# Patient Record
Sex: FEMALE | Race: WHITE
[De-identification: names, ages, dates, MRNs, and addresses within clinical notes are randomized per-mention and may not be internally consistent; named-entity substitution may affect disease eponyms.]

---

## 2017-04-07 ENCOUNTER — HOSPITAL ENCOUNTER (INPATIENT)
Dept: HOSPITAL 10 - E/R | Age: 82
LOS: 5 days | Discharge: HOME | DRG: 872 | End: 2017-04-12
Attending: INTERNAL MEDICINE | Admitting: INTERNAL MEDICINE
Payer: MEDICARE

## 2017-04-07 VITALS
BODY MASS INDEX: 26.84 KG/M2 | WEIGHT: 136.69 LBS | HEIGHT: 60 IN | WEIGHT: 136.69 LBS | HEIGHT: 60 IN | BODY MASS INDEX: 26.84 KG/M2

## 2017-04-07 VITALS — HEART RATE: 81 BPM | SYSTOLIC BLOOD PRESSURE: 137 MMHG | DIASTOLIC BLOOD PRESSURE: 64 MMHG | RESPIRATION RATE: 18 BRPM

## 2017-04-07 VITALS — TEMPERATURE: 98.8 F

## 2017-04-07 VITALS — DIASTOLIC BLOOD PRESSURE: 62 MMHG | SYSTOLIC BLOOD PRESSURE: 101 MMHG | RESPIRATION RATE: 19 BRPM

## 2017-04-07 DIAGNOSIS — E87.1: ICD-10-CM

## 2017-04-07 DIAGNOSIS — M06.9: ICD-10-CM

## 2017-04-07 DIAGNOSIS — I10: ICD-10-CM

## 2017-04-07 DIAGNOSIS — E20.9: ICD-10-CM

## 2017-04-07 DIAGNOSIS — A41.9: Primary | ICD-10-CM

## 2017-04-07 DIAGNOSIS — E78.5: ICD-10-CM

## 2017-04-07 DIAGNOSIS — K83.0: ICD-10-CM

## 2017-04-07 DIAGNOSIS — R73.03: ICD-10-CM

## 2017-04-07 DIAGNOSIS — M32.0: ICD-10-CM

## 2017-04-07 DIAGNOSIS — K80.42: ICD-10-CM

## 2017-04-07 DIAGNOSIS — E87.6: ICD-10-CM

## 2017-04-07 DIAGNOSIS — K82.9: ICD-10-CM

## 2017-04-07 DIAGNOSIS — K83.8: ICD-10-CM

## 2017-04-07 DIAGNOSIS — D64.9: ICD-10-CM

## 2017-04-07 LAB
ADD SCAN DIFF: NO
ADD UMIC: YES
ALBUMIN SERPL-MCNC: 3.7 G/DL (ref 3.3–4.9)
ALBUMIN/GLOB SERPL: 1.02 {RATIO}
ALP SERPL-CCNC: 182 IU/L (ref 42–121)
ALT SERPL-CCNC: 26 IU/L (ref 13–69)
ANION GAP SERPL CALC-SCNC: 11 MMOL/L (ref 8–16)
AST SERPL-CCNC: 20 IU/L (ref 15–46)
BACTERIA #/AREA URNS HPF: (no result) /[HPF]
BASOPHILS # BLD AUTO: 0.1 10^3/UL (ref 0–0.1)
BASOPHILS NFR BLD: 0.2 % (ref 0–2)
BILIRUB DIRECT SERPL-MCNC: 0 MG/DL (ref 0–0.2)
BILIRUB SERPL-MCNC: 0.7 MG/DL (ref 0.2–1.3)
BUN SERPL-MCNC: 8 MG/DL (ref 7–20)
CALCIUM SERPL-MCNC: 9 MG/DL (ref 8.4–10.2)
CHLORIDE SERPL-SCNC: 100 MMOL/L (ref 97–110)
CO2 SERPL-SCNC: 26 MMOL/L (ref 21–31)
COLOR UR: (no result)
CREAT SERPL-MCNC: 0.51 MG/DL (ref 0.44–1)
EOSINOPHIL # BLD: 0 10^3/UL (ref 0–0.5)
EOSINOPHIL NFR BLD: 0 % (ref 0–7)
ERYTHROCYTE [DISTWIDTH] IN BLOOD BY AUTOMATED COUNT: 16.1 % (ref 11.5–14.5)
GLOBULIN SER-MCNC: 3.6 G/DL (ref 1.3–3.2)
GLUCOSE SERPL-MCNC: 154 MG/DL (ref 70–220)
GLUCOSE UR STRIP-MCNC: NEGATIVE %
HCT VFR BLD CALC: 33 % (ref 37–47)
HGB BLD-MCNC: 10.7 G/DL (ref 12–16)
KETONES UR STRIP.AUTO-MCNC: 40 MG/DL
LYMPHOCYTES # BLD AUTO: 1 10^3/UL (ref 0.8–2.9)
LYMPHOCYTES NFR BLD AUTO: 4.7 % (ref 15–51)
MCH RBC QN AUTO: 27.2 PG (ref 29–33)
MCHC RBC AUTO-ENTMCNC: 32.4 G/DL (ref 32–37)
MCV RBC AUTO: 83.8 FL (ref 82–101)
MONOCYTES # BLD: 1.4 10^3/UL (ref 0.3–0.9)
MONOCYTES NFR BLD: 6.9 % (ref 0–11)
NEUTROPHILS # BLD: 18 10^3/UL (ref 1.6–7.5)
NEUTROPHILS NFR BLD AUTO: 87.6 % (ref 39–77)
NITRITE UR QL STRIP.AUTO: NEGATIVE
NRBC # BLD MANUAL: 0 10^3/UL (ref 0–0)
NRBC BLD QL: 0 /100WBC (ref 0–0)
PLATELET # BLD: 398 10^3/UL (ref 140–415)
PMV BLD AUTO: 9.3 FL (ref 7.4–10.4)
POTASSIUM SERPL-SCNC: 3.3 MMOL/L (ref 3.5–5.1)
PROT SERPL-MCNC: 7.3 G/DL (ref 6.1–8.1)
RBC # BLD AUTO: 3.94 10^6/UL (ref 4.2–5.4)
RBC # UR AUTO: (no result) /UL
RBC #/AREA URNS HPF: (no result) /HPF
SODIUM SERPL-SCNC: 134 MMOL/L (ref 135–144)
URINE BILIRUBIN (DIP): NEGATIVE
URINE TOTAL PROTEIN (DIP): NEGATIVE
UROBILINOGEN UR STRIP-ACNC: (no result) (ref 0.1–1)
WBC # BLD AUTO: 20.6 10^3/UL (ref 4.8–10.8)
WBC # UR STRIP: NEGATIVE /UL

## 2017-04-07 PROCEDURE — 82962 GLUCOSE BLOOD TEST: CPT

## 2017-04-07 PROCEDURE — 81001 URINALYSIS AUTO W/SCOPE: CPT

## 2017-04-07 PROCEDURE — 80048 BASIC METABOLIC PNL TOTAL CA: CPT

## 2017-04-07 PROCEDURE — 96375 TX/PRO/DX INJ NEW DRUG ADDON: CPT

## 2017-04-07 PROCEDURE — 81003 URINALYSIS AUTO W/O SCOPE: CPT

## 2017-04-07 PROCEDURE — C2617 STENT, NON-COR, TEM W/O DEL: HCPCS

## 2017-04-07 PROCEDURE — 82247 BILIRUBIN TOTAL: CPT

## 2017-04-07 PROCEDURE — 96376 TX/PRO/DX INJ SAME DRUG ADON: CPT

## 2017-04-07 PROCEDURE — 80076 HEPATIC FUNCTION PANEL: CPT

## 2017-04-07 PROCEDURE — 82248 BILIRUBIN DIRECT: CPT

## 2017-04-07 PROCEDURE — 74181 MRI ABDOMEN W/O CONTRAST: CPT

## 2017-04-07 PROCEDURE — 76705 ECHO EXAM OF ABDOMEN: CPT

## 2017-04-07 PROCEDURE — 84484 ASSAY OF TROPONIN QUANT: CPT

## 2017-04-07 PROCEDURE — 96361 HYDRATE IV INFUSION ADD-ON: CPT

## 2017-04-07 PROCEDURE — 96365 THER/PROPH/DIAG IV INF INIT: CPT

## 2017-04-07 PROCEDURE — 93005 ELECTROCARDIOGRAM TRACING: CPT

## 2017-04-07 PROCEDURE — 74330 X-RAY BILE/PANC ENDOSCOPY: CPT

## 2017-04-07 PROCEDURE — 80053 COMPREHEN METABOLIC PANEL: CPT

## 2017-04-07 PROCEDURE — 85025 COMPLETE CBC W/AUTO DIFF WBC: CPT

## 2017-04-07 PROCEDURE — 36415 COLL VENOUS BLD VENIPUNCTURE: CPT

## 2017-04-07 PROCEDURE — 83690 ASSAY OF LIPASE: CPT

## 2017-04-07 PROCEDURE — 74176 CT ABD & PELVIS W/O CONTRAST: CPT

## 2017-04-07 PROCEDURE — 83735 ASSAY OF MAGNESIUM: CPT

## 2017-04-07 RX ADMIN — MORPHINE SULFATE PRN MG: 2 INJECTION, SOLUTION INTRAMUSCULAR; INTRAVENOUS at 21:51

## 2017-04-07 RX ADMIN — ASCORBIC ACID, VITAMIN A PALMITATE, CHOLECALCIFEROL, THIAMINE HYDROCHLORIDE, RIBOFLAVIN-5 PHOSPHATE SODIUM, PYRIDOXINE HYDROCHLORIDE, NIACINAMIDE, DEXPANTHENOL, ALPHA-TOCOPHEROL ACETATE, VITAMIN K1, FOLIC ACID, BIOTIN, CYANOCOBALAMIN SCH MLS/HR: 200; 3300; 200; 6; 3.6; 6; 40; 15; 10; 150; 600; 60; 5 INJECTION, SOLUTION INTRAVENOUS at 19:53

## 2017-04-07 RX ADMIN — Medication SCH MLS/HR: at 21:53

## 2017-04-07 NOTE — HP
DATE OF ADMISSION: 04/07/2017

 

CHIEF COMPLAINT:  Right upper quadrant pain.

 

HISTORY OF PRESENT ILLNESS:  The patient is an 81-year-old female with a past medical history positi
ve for hypertension, hyperlipidemia, being prediabetic, rheumatoid arthritis and hypothyroidism.  Ulices adames was in her usual health until 3 days ago when the patient developed right upper quadrant pain.
  It got extremely worse over the last 12 hours.  The patient stated the pain was sharp and associat
ed with mild nausea.  Patient denies any fevers, denies chills, denies chest pain, denies shortness 
of breath, denies bilateral lower extremities edema.  Denied vomiting.  On admission to the emergenc
y room, patient had leukocytosis with white blood cells being elevated at 20.6.  Patient underwent a
 gallbladder ultrasound which revealed no cholelithiasis; however, the common bile duct is mild to m
oderately dilated.  

 

The patient also underwent a CT of the abdomen and pelvis which revealed distended gallbladder with 
mild surrounding inflammation suggesting for acute cholecystitis, diverticulosis of the descending a
nd sigmoid colon without evidence of diverticulitis, multiple bilateral renal cortical and parapelvi
c cysts, aortoiliac atherosclerosis, moderate hiatal hernia, bronchiectasis in the right middle lobe
 and left lingular segment.

 

The patient was given Zofran for nausea and morphine for pain and was admitted for further evaluatio
n and management to medical/surgical floor.

 

PAST MEDICAL HISTORY:  Positive for hypertension, hyperlipidemia, gastritis, prediabetes and rheumat
oid arthritis.  Patient is followed by Dr. Dunn in rheumatology consultation was started on meth
otrexate and also a history of hypothyroidism.

 

PAST SURGICAL HISTORY:  Patient had a colonoscopy a couple of years ago.  Denies any other surgeries
.

 

FAMILY HISTORY:  Noncontributory.

 

SOCIAL HISTORY:  Patient lives at home with his son.  Patient denies any tobacco use, denies any ill
icit drug use and denies any alcohol use.

 

ALLERGIES:  PATIENT IS ALLERGIC TO PENICILLIN G.

 

MEDICATIONS ON ADMISSION:  

1.  Acarbose

2.  Methotrexate.

3.  Valsartan.

4.  Atorvastatin.

5.  Esomeprazole.

6.  Levothyroxine.

 

REVIEW OF SYSTEMS:  A 12-point review of systems is negative unless what mentioned in the HPI.

 

PHYSICAL  ASSESSMENT

GENERAL:  Well-developed, well-nourished female, awake, alert.

VITAL SIGNS:  Temperature is 99.1, pulse is 81, blood pressure is 137/64, respiratory rate 18, oxyge
n saturation 95% on room air.

HEENT:  Head is atraumatic, normocephalic.  Pupils equal bilaterally.  Reactive to light and accommo
dation.  Oral mucosa is pink and moist.

NECK:  Supple, no cervical lymphadenopathy, no thyromegaly.

RESPIRATORY:  Lungs were clear bilaterally.  There is no rhonchi, wheezes, rales noted.

CARDIOVASCULAR:  Normal S1, S2.  No murmurs, gallops, clicks, rubs noted.

GASTROINTESTINAL:  Abdomen is round, soft, nondistended.  Patient has right upper quadrant tendernes
s.

EXTREMITIES:  There is no edema, clubbing, cyanosis.  Pulses equal bilaterally 2+.

SKIN:  There is no rash, petechiae noted.

NEUROLOGIC:  Patient is alert and oriented x4.  No focal deficits noted.  Motor strength 5/5 in lowe
r extremities.

 

LABORATORY DATA:  On admission, CBC:  White blood cells 20.6, hemoglobin 10.7, hematocrit 33.0, plat
elets 398.  Chemistry:  Sodium was 134, potassium 3.3, chloride 100, carbon dioxide 26, anion gap 11
, BUN is 8, creatinine 0.51 and glucose 154, AST 20, ALT 26, alkaline phosphatase 182.  Troponin les
s than 0.01.  Lipase is 18.  Urine is negative for nitrite, negative for leukocyte esterase.

 

ASSESSMENT AND PLAN:

1.  Possible acute cholecystitis.  The patient is given Flagyl and Rocephin in the emergency room.  
Will start patient on Zosyn.

2.  Systemic inflammatory response syndrome with leukocytosis secondary to acute cholecystitis.  Con
tinue antibiotics.  Will obtain blood cultures to rule out bacteremia.

3.  Rule out choledocholithiasis.  The patient will undergo MRCP.  Dr. Knapp was asked to see patie
nt in gastroenterology consultation.  We will ask Dr. Edwards to see patient in general surgery cons
ultation.

4.  Hypertension.  We will continue the patient on Diovan

5.  Hydralazine p.r.n. for systolic blood pressure above 170.

6.  Rheumatoid arthritis.  Continue methotrexate.

7.  Hypoparathyroidism.  Continue Synthroid.

8.  Hyperlipidemia.  Continue statin.

9.  Gastritis by history.  Continue Protonix for peptic ulcer disease prophylaxis.  We will continue
 sequential compression device for deep venous thrombosis prophylaxis.  Further recommendations base
d on clinical course.  Plan of care discussed with Dr. Galdamez.

 

 

Dictated By: RADHA DE JESUS NP for VIOLETTE GALDAMEZ MD

 

SR/NTS

DD:    04/07/2017 18:19:47

DT:    04/07/2017 18:59:10

Conf#: [id number]

DID#:  [id number]

## 2017-04-07 NOTE — RADRPT
PROCEDURE:   MRCP. 

 

CLINICAL INDICATION:   Abdominal pain. 

 

TECHNIQUE:   MRCP was performed.  Patient was examined without contrast.  3-D coronal rotating MIP i
mages of the biliary tree are available for review. 

 

COMPARISON:   CT and ultrasound, 04/07/2017  

 

FINDINGS:

 

The gallbladder is distended.  Gallbladder wall thickening and trace pericholecystic fluid are ident
ified.  No definite evidence of gallstone is seen.  There is no intra or extrahepatic biliary dilata
tion.  No common duct stone, stricture or filling defect is seen.  Pancreatic duct is normal in mckenzie
iris.

 

Liver, pancreas, spleen, adrenal glands and kidneys are unremarkable except for bilateral benign stephanie
al cysts.  There is no obstructive uropathy.  Moderate to large hiatal hernia is present.

 

Abdominal aorta is normal in caliber.  There is no retroperitoneal or anna marie hepatis lymphadenopathy.
  Colonic diverticulosis is seen without evidence for diverticulitis.  There is no evidence of bowel
 obstruction.  Urinary bladder appears significantly distended, concerning for urinary retention.  T
he surrounding osseous structures are remarkable for scoliosis and degenerative spondylosis of the s
pine.

 

IMPRESSION:

 

1.  The gallbladder is distended.  Gallbladder wall thickening and trace pericholecystic fluid are i
dentified, concerning for cholecystitis.  No definite evidence of gallstone is seen.  Consider nucle
ar medicine HIDA scan for further evaluation, if clinically warranted.  

2.  No biliary dilatation or choledocholithiasis is identified.  Common bile duct maximal diameter i
s 4 mm.

3.  Moderate to large hiatal hernia is present.

4.  Colonic diverticulosis is seen without diverticulitis.

5.  Urinary bladder is significantly distended, concerning for urinary retention.

 

RPTAT: PP

_____________________________________________ 

.Antwan Valdez MD, MD           Date    Time 

Electronically viewed and signed by .Antwan Valdez MD, MD on 04/07/2017 15:45 

 

D:  04/07/2017 15:45  T:  04/07/2017 15:45

.R/

## 2017-04-07 NOTE — ERA
ER Documentation


Chief Complaint


Date/Time


DATE: 4/7/17 


TIME: 11:11


Chief Complaint


pt bib self with c/o abd pain starting a few days ago rad to right flank





HPI


This is an 81-year-old female who presents to the emergency room with her 

family for evaluation of abdominal pain.  The patient states that she has had 

abdominal pain for the past 12 hours.  She localizes it to the midportion of 

her abdomen, she states that it does radiate to her back.  She states that it 

is sharp and achy pain associated with mild nausea.  The patient denies any 

fevers associated with this, denies any relieving factors for her pain and came 

to the emergency room for evaluation





ROS


All systems reviewed and are negative except as per history of present illness.





Medications


Home Meds


Reported Medications


Cholecalciferol* (Vitamin D3*) 1,000 Unit Tablet, 5000 UNIT PO DAILY, TAB


   4/7/17


Methotrexate* (Methotrexate*) 2.5 Mg Tab, 7.5 MG PO BID, TAB


   4/7/17


Levothyroxine Sodium* (Levoxyl*) 50 Mcg Tablet, 50 MCG PO BEFORE BREAKFAST, #30 

TAB


   4/7/17


Acarbose* (Precose*) 50 Mg Tablet, 50 MG PO WITH BREAKFAST, TAB


   4/7/17


Valsartan* (Diovan*) 160 Mg Tablet, 160 MG PO DAILY, TAB


   12/6/16


Esomeprazole Mag Trihydrate (Nexium) 40 Mg Capsule.dr, 40 MG PO DAILY


   10/20/12


Atorvastatin (Lipitor) 80 Mg Tablet, 80 MG PO DAILY


   10/20/12


Discontinued Reported Medications


Krill/Om-3/Dha/Epa/Phospho/Ast (Megared Omega-3 Krill Oil Sfgl) 1 Each Capsule, 

1 EACH PO, CAP


   12/6/16


Benazepril Hcl* (Benazepril Hcl*) 40 Mg Tablet, 40 MG PO DAILY, #30 TAB


   12/6/16


Acarbose* (Precose*) 25 Mg Tablet, 25 MG PO WITH BREAKFAST, TAB


   12/6/16


Ibuprofen* (Motrin*) 600 Mg Tab, 600 MG PO Q8H Y for PAIN, TAB


   12/6/16


Calcium Carbonate/Vitamin D3 (Oysco 500+D Tablet) 1 Tab Tablet, 1 TAB PO DAILY


   10/20/12


Cholecalciferol* (Vitamin D3*) 2,000 Unit Cap, 2000 UNIT GTB DAILY


   10/20/12


Discontinued Scripts


Phenazopyridine Hcl* (Pyridium*) 100 Mg Tab, 100 MG PO TID Y for URINARY PAIN, #

8 TAB


   Prov:AASHISH ROBERTS DO         12/6/16


Ciprofloxacin Hcl* (Ciprofloxacin Hcl*) 500 Mg Tablet, 500 MG PO BID for 3 Days

, TAB


   Prov:AASHISH ROBERTS DO         12/6/16





Allergies


Allergies:  


Coded Allergies:  


     penicillin G (Verified  Allergy, Intermediate, RASH, HEADACHE, 12/6/16)





PMhx/Soc


History of Surgery:  No


Anesthesia Reaction:  No


Hx Neurological Disorder:  No


Hx Respiratory Disorders:  No


Hx Cardiac Disorders:  Yes (HTN)


Hx Psychiatric Problems:  No


Hx Miscellaneous Medical Probl:  Yes (hyperlipidemia , OSTEOPOROSIS , ARTHRITIS 

)


Hx Alcohol Use:  No


Hx Substance Use:  No


Hx Tobacco Use:  No


Smoking Status:  Never smoker





Physical Exam


Vitals





Vital Signs








  Date Time  Temp Pulse Resp B/P Pulse Ox O2 Delivery O2 Flow Rate FiO2


 


4/7/17 09:59 99.4 83 18 152/61 98 Room Air  


 


4/7/17 08:55 98.8 83 18 119/73 100 Room Air  


 


4/7/17 08:12 98.3 87 18 134/60 100   








Physical Exam


INITIAL VITAL SIGNS: Reviewed by me


GENERAL:  The patient is well developed and appropriate for usual state of 

health who appears to have mild abdominal pain


HEENT: Pupils equal, round, and reactive to light.  EOMI. There is no scleral 

icterus.


NECK:  C-spine is soft and supple, there is no meningismus.  There is no 

cervical lymphadenopathy.


LUNGS:  Clear to auscultation bilaterally. There are no rales, wheezes or 

rhonchi.


HEART:  Regular rate and rhythm, no murmurs, clicks, rubs or gallops.


ABDOMEN: Positive Wogn sign, otherwise no CVAT, soft, non-tender, non-

distended.  There are bowel sounds in all four quadrants. No rebound or 

guarding.


EXTREMITIES:  There is no peripheral cyanosis or edema.  No focal swelling or 

erythema.


NEUROLOGICAL:  The patient moves all four extremities with 5/5 strength.  

Cranial nerves II - XII are intact. Normal gait. Alert and oriented


SKIN:  There is no apparent rash or petechiae.


HEME/LYMPHATIC:  There is no evidence of excessive bruising or lymphedema.


PSYCHIATRIC:  The patient does not appear anxious or depressed.


Result Diagram:  


4/7/17 0840                                                                    

            4/7/17 0840





Results 24 hrs





 Laboratory Tests








Test


  4/7/17


08:40 4/7/17


09:42


 


White Blood Count 20.610^3/ul  


 


Red Blood Count 3.9410^6/ul  


 


Hemoglobin 10.7g/dl  


 


Hematocrit 33.0%  


 


Mean Corpuscular Volume 83.8fl  


 


Mean Corpuscular Hemoglobin 27.2pg  


 


Mean Corpuscular Hemoglobin


Concent 32.4g/dl 


  


 


 


Red Cell Distribution Width 16.1%  


 


Platelet Count 36585^3/UL  


 


Mean Platelet Volume 9.3fl  


 


Neutrophils % 87.6%  


 


Lymphocytes % 4.7%  


 


Monocytes % 6.9%  


 


Eosinophils % 0.0%  


 


Basophils % 0.2%  


 


Nucleated Red Blood Cells % 0.0/100WBC  


 


Neutrophils # 18.010^3/ul  


 


Lymphocytes # 1.010^3/ul  


 


Monocytes # 1.410^3/ul  


 


Eosinophils # 0.010^3/ul  


 


Basophils # 0.110^3/ul  


 


Nucleated Red Blood Cells # 0.010^3/ul  


 


Sodium Level 134mmol/L  


 


Potassium Level 3.3mmol/L  


 


Chloride Level 100mmol/L  


 


Carbon Dioxide Level 26mmol/L  


 


Anion Gap 11  


 


Blood Urea Nitrogen 8mg/dl  


 


Creatinine 0.51mg/dl  


 


Glucose Level 154mg/dl  


 


Calcium Level 9.0mg/dl  


 


Total Bilirubin 0.7mg/dl  


 


Direct Bilirubin 0.00mg/dl  


 


Indirect Bilirubin 0.7mg/dl  


 


Aspartate Amino Transf


(AST/SGOT) 20IU/L 


  


 


 


Alanine Aminotransferase


(ALT/SGPT) 26IU/L 


  


 


 


Alkaline Phosphatase 182IU/L  


 


Troponin I < 0.010ng/ml  


 


Total Protein 7.3g/dl  


 


Albumin 3.7g/dl  


 


Globulin 3.60g/dl  


 


Albumin/Globulin Ratio 1.02  


 


Lipase 18U/L  


 


Urine Color  LT. YELLOW 


 


Urine Clarity  CLEAR 


 


Urine pH  8.0 


 


Urine Specific Gravity  1.010 


 


Urine Ketones  40 


 


Urine Nitrite  NEGATIVE 


 


Urine Bilirubin  NEGATIVE 


 


Urine Urobilinogen  2.0 E.U./dL 


 


Urine Leukocyte Esterase  NEGATIVE 


 


Urine Microscopic RBC  25-50/HPF 


 


Urine Microscopic WBC  0-2/HPF 


 


Urine Epithelial Cells  FEW 


 


Urine Bacteria  FEW 


 


Urine Hemoglobin  3+ 


 


Urine Glucose  NEGATIVE% 


 


Urine Total Protein  NEGATIVE 








 Current Medications








 Medications


  (Trade)  Dose


 Ordered  Sig/James


 Route


 PRN Reason  Start Time


 Stop Time Status Last Admin


Dose Admin


 


 Sodium Chloride


  (NS)  1,000 ml @ 


 1,000 mls/hr  Q1H STAT


 IV


   4/7/17 08:20


 4/7/17 09:19 DC 4/7/17 08:53


 


 


 Morphine Sulfate


  (morphine)  4 mg  ONCE  STAT


 IV


   4/7/17 08:20


 4/7/17 08:21 DC 4/7/17 08:51


 


 


 Ondansetron HCl


  (Zofran Inj)  4 mg  ONCE  STAT


 IV


   4/7/17 08:20


 4/7/17 08:21 DC 4/7/17 08:48


 


 


 Famotidine 20 mg  20 mg  ONCE  STAT


 IV


   4/7/17 08:20


 4/7/17 08:21 DC 4/7/17 08:50


 


 


 Ceftriaxone Sodium  50 ml @ 


 100 mls/hr  ONCE  ONCE


 IVPB


   4/7/17 10:00


 4/7/17 10:29 DC 4/7/17 10:38


 


 


 Metronidazole


  (Flagyl 500 Mg


  (Pmx))  100 ml @ 


 100 mls/hr  ONCE  ONCE


 IVPB


   4/7/17 10:00


 4/7/17 10:59 DC  


 


 


 Morphine Sulfate


 4 mg  4 mg  ONCE  STAT


 IV


   4/7/17 10:49


 4/7/17 10:50 DC  


 


 


 Sodium Chloride


  (NS)  1,000 ml @ 


 80 mls/hr  [id number]


 IV


   4/7/17 11:03


 4/7/17 23:32   


 


 


 Ondansetron HCl


  (Zofran Inj)  4 mg  BRIDGE ORDER PRN


 IV


 NAUSEA AND/OR VOMITING  4/7/17 11:30


 4/8/17 11:29   


 


 


 Acetaminophen


  (Tylenol Tab)  650 mg  ER BRIDGE PRN


 PO


 MILD PAIN/FEVER  4/7/17 11:30


 4/8/17 11:29   


 











Procedures/MDM


CT abdomen pelvis without: 1. Markedly distended gallbladder with mild 

surrounding inflammation suggesting acute cholecystitis.  Consider ultrasound 

for further evaluation.


2.  Diverticulosis of the descending and sigmoid colon without evidence of 

acute diverticulitis.


3.  Multiple bilateral renal cortical and parapelvic cysts.


4.  Aortoiliac atherosclerosis.


5.  Moderate hiatal hernia.


6.  Bronchiectasis in the right middle lobe and left lingular segment.


Ultrasound gallbladder: There is no cholelithiasis or acute cholecystitis 

however the common bile duct is mild to moderately dilated to 9 mm.  A HIDA 

scan or MRCP can be obtained for further evaluation of possible common bile 

duct obstruction. Correlation with a bilirubin level for signs of cholestasis 

is recommended.


 





This 81-year-old female presents to the emergency room for evaluation of 

abdominal pain.  When I did evaluate this patient she appeared to be in a mild 

amount of distress.  Her pain was exacerbated with palpation of the right upper 

quadrant.  I did obtain an ultrasound which shows a dilated gallbladder with a 

moderately dilated common bile duct at 9 mm.  CT of the abdomen and pelvis does 

not show any signs of appendicitis or any other intra-abdominal process.  This 

patient was found to have a leukocytosis greater than 20,000.  She was given 

Rocephin and Flagyl here in the emergency room.  I have spoken to this patient'

s admitting physician, Dr. Erickson, and I discussed my concern for possible 

choledocholithiasis.  He agrees that this patient should be placed for 

admission at this time.  I have paged our gastroenterologist that he requested, 

Dr. lopez, and I am awaiting his call back.  I did order an MRCP.  This patient

's pain is controlled with morphine in the emergency room.





Departure


Diagnosis:  


 Primary Impression:  


 Choledocholithiasis


 Additional Impressions:  


 Abdominal pain


 Leukocytosis


Condition:  JOSH Solano DO Apr 7, 2017 11:15

## 2017-04-07 NOTE — RADRPT
PROCEDURE:   CT Abdomen and Pelvis without contrast. 

 

CLINICAL INDICATION:  Right upper quadrant and flank pain.

 

TECHNIQUE:   CT scan of the abdomen and pelvis without contrast was performed on a multidetector hig
h-resolution CT scanner. The patient was scanned without intravenous contrast.  Coronal and sagittal
 reformatted images were obtained from the axial source images. Images were reviewed on a high-resol
[employer] PACS workstation. The total exam CTDI equals 9.58 mGy and the total exam DLP equals 511.76 mGy
-cm.

 

One or more of the following dose reduction techniques were used:

 

Automated exposure control.

Adjustment of the mA and/or kV according to patient size.

Use of iterative reconstruction technique.

 

COMPARISON:   CT abdomen and pelvis 04/25/2015. 

 

FINDINGS:

 

CT abdomen:

 

The lung bases are remarkable for focal bronchiectasis in the medial right middle lobe and lingular 
segment.  The heart size is normal, without pericardial thickening or effusion. 

 

 The liver is normal in size and density without focal mass or intrahepatic biliary dilatation.  The
 spleen is normal in size and homogeneous in density.  The stomach is partially collapsed, but is gr
ossly unremarkable.  The pancreas as visualized is normal.  The gallbladder is markedly distended wi
th mild surrounding fatty stranding. There is no evidence of biliary dilatation.  The adrenal glands
 are symmetric and normal.  The kidneys are symmetrically unremarkable as well.  No renal calculus o
r obstructive uropathy or mass lesion is seen. There are multiple bilateral renal cysts.

 

The aorta is of normal caliber.  Aortic vascular calcifications are present.  There is no retroperit
quintanilla lymphadenopathy.  The anna marie hepatis region is clear. There is diverticulosis of the descending
 and sigmoid colon without evidence of acute diverticulitis. There is moderate hiatal hernia.

 

CT pelvis:

 

The small bowel loops situated within the pelvis are unremarkable.  The pelvic organs are normal.  T
he pelvic sidewalls and inguinal regions are clear.  The sigmoid colon and rectum are remarkable for
 sigmoid diverticulosis.  No mass, lymphadenopathy, or free fluid is seen.  No acute inflammation is
 seen.  The surrounding osseous structures are remarkable for degenerative spondylosis of the spine.
  No osteolytic or osteoblastic lesion is detected.  

 

IMPRESSION:

 

1. Markedly distended gallbladder with mild surrounding inflammation suggesting acute cholecystitis.
  Consider ultrasound for further evaluation.

2.  Diverticulosis of the descending and sigmoid colon without evidence of acute diverticulitis.

3.  Multiple bilateral renal cortical and parapelvic cysts.

4.  Aortoiliac atherosclerosis.

5.  Moderate hiatal hernia.

6.  Bronchiectasis in the right middle lobe and left lingular segment.

 

RPTAT: BB

_____________________________________________ 

.Ana Paula Quigley MD, MD           Date    Time 

Electronically viewed and signed by .Ana Paula Quigley MD, MD on 04/07/2017 09:33 

 

D:  04/07/2017 09:33  T:  04/07/2017 09:33

.O/

## 2017-04-07 NOTE — RADRPT
PROCEDURE:   Right Upper Quadrant Ultrasound. 

 

CLINICAL INDICATION:   cholecystitis, abdominal pain

 

TECHNIQUE:   Multiple real-time images were acquired of the patient's right upper quadrant abdomen a
nd retroperitoneum utilizing a high resolution transducer. 

 

COMPARISON:   None 

 

FINDINGS:

 

The liver measures 12.4 cm, and demonstrates normal echogenicity. The main portal vein is patent wit
h proper directional flow. There is no intrahepatic biliary ductal dilatation. The extrahepatic comm
on bile duct measures 9 mm.

 

The gallbladder is without stones, wall thickening, or pericholecystic fluid.

 

The pancreas is not visualized.

 

The right kidney measures 10.6 cm and demonstrates normal echotexture. There is no right renal calcu
mervin or hydronephrosis.

 

The visualized abdominal aorta and IVC are grossly unremarkable.

 

 

IMPRESSION:

There is no cholelithiasis or acute cholecystitis however the common bile duct is mild to moderately
 dilated to 9 mm.  A HIDA scan or MRCP can be obtained for further evaluation of possible common antwon
e duct obstruction. Correlation with a bilirubin level for signs of cholestasis is recommended.

 

 

RPTAT: EE

_____________________________________________ 

Physician Junito           Date    Time 

Electronically viewed and signed by Physician Junito on 04/07/2017 10:29 

 

D:  04/07/2017 10:29  T:  04/07/2017 10:29

JAMES/

## 2017-04-07 NOTE — CONS
DATE OF ADMISSION: 04/07/2017

DATE OF CONSULTATION:  04/07/2017

 

 

 

TYPE OF CONSULTATION:  Gastroenterology.

 

Dear Dr. Galdamez:

 

Thank you for asking me to see Mrs. Diaz in GI consultation.

 

HISTORY OF PRESENT ILLNESS:  The patient, as you know, is an 81-year-old  female who was bro
ught to the hospital because of right upper quadrant pain she has been experiencing for the past 3 d
ays.  She has been nauseated.  She has been vomiting.  She has had some fever, but no chills, no conrado
rrhea, no history of GI bleeding, no history of jaundice.

 

MEDICATIONS:  She has been on multiple medications prior to the admission, which include:

1.  Methotrexate for rheumatoid arthritis.

2.  Lipitor.

3.  Diovan.

4.  Nexium.

5.  Precose

6.  Levoxyl.

7.  Vitamin D3.

 

REVIEW OF SYSTEM:  Positive for rheumatoid arthritis and also history of hypertension, history of di
abetes.

 

PAST SURGICAL HISTORY:  No surgeries in the past.

 

SOCIAL HISTORY:  No history of alcoholism.

 

PHYSICAL EXAMINATION:

GENERAL:  The patient is an 81-year-old  female who at this time is alert, she is well built
.

VITAL SIGNS:  She does have a temperature of 99.4, blood pressure 152/61, pulse 83.

CARDIOVASCULAR:  Normal heart sounds.

RESPIRATORY:  Normal breath sounds.

ABDOMEN:  Showed unremarkable findings except right upper quadrant tenderness.

 

LABORATORY DATA:  WBC is 20,600, hemoglobin is 10.7.  The potassium 3.3, BUN 8, creatinine 0.5, bili
camarena 0.7, AST 20, ALT 26, alkaline phosphatase 182.

 

The CAT scan of the abdomen showed evidence of dilated common bile duct, probably dilated gallbladde
r as well.

 

Ultrasound showed evidence of a dilated common bile duct.

 

CLINICAL IMPRESSION:  The patient presenting with history of abdominal pain, dilated bile duct on th
e CAT scan and ultrasound, elevated alkaline phosphatase, it is quite possible that we may be dealin
g with choledocholithiasis.

 

At this time, MRCP is pending.

 

Depending upon MRCP, the patient may need ERCP.

 

Once again, Dr. Galdamez, thank you for this consultation.

 

 

Dictated By: LILLIAM ANTONIO/GRAHAM

DD:    04/07/2017 11:52:20

DT:    04/07/2017 12:12:41

Conf#: [id number]

DID#:  [id number]

CC: VIOLETTE GALDAMEZ MD; LILLIAM CRENSHAW MD;*EndCC*

## 2017-04-08 VITALS — SYSTOLIC BLOOD PRESSURE: 138 MMHG | DIASTOLIC BLOOD PRESSURE: 65 MMHG | RESPIRATION RATE: 19 BRPM

## 2017-04-08 VITALS — SYSTOLIC BLOOD PRESSURE: 148 MMHG | DIASTOLIC BLOOD PRESSURE: 61 MMHG | RESPIRATION RATE: 16 BRPM | HEART RATE: 66 BPM

## 2017-04-08 VITALS — HEART RATE: 61 BPM | RESPIRATION RATE: 17 BRPM | DIASTOLIC BLOOD PRESSURE: 61 MMHG | SYSTOLIC BLOOD PRESSURE: 148 MMHG

## 2017-04-08 VITALS — HEART RATE: 68 BPM | DIASTOLIC BLOOD PRESSURE: 58 MMHG | SYSTOLIC BLOOD PRESSURE: 147 MMHG | RESPIRATION RATE: 18 BRPM

## 2017-04-08 VITALS — HEART RATE: 62 BPM | DIASTOLIC BLOOD PRESSURE: 56 MMHG | SYSTOLIC BLOOD PRESSURE: 173 MMHG | RESPIRATION RATE: 17 BRPM

## 2017-04-08 VITALS — HEART RATE: 58 BPM | SYSTOLIC BLOOD PRESSURE: 160 MMHG | DIASTOLIC BLOOD PRESSURE: 74 MMHG | RESPIRATION RATE: 17 BRPM

## 2017-04-08 VITALS — HEART RATE: 66 BPM | RESPIRATION RATE: 17 BRPM | SYSTOLIC BLOOD PRESSURE: 171 MMHG | DIASTOLIC BLOOD PRESSURE: 60 MMHG

## 2017-04-08 VITALS — RESPIRATION RATE: 17 BRPM | DIASTOLIC BLOOD PRESSURE: 59 MMHG | SYSTOLIC BLOOD PRESSURE: 164 MMHG | HEART RATE: 64 BPM

## 2017-04-08 VITALS — RESPIRATION RATE: 17 BRPM | HEART RATE: 62 BPM | SYSTOLIC BLOOD PRESSURE: 162 MMHG | DIASTOLIC BLOOD PRESSURE: 60 MMHG

## 2017-04-08 VITALS — HEART RATE: 62 BPM | SYSTOLIC BLOOD PRESSURE: 158 MMHG | DIASTOLIC BLOOD PRESSURE: 54 MMHG | RESPIRATION RATE: 19 BRPM

## 2017-04-08 VITALS — RESPIRATION RATE: 17 BRPM | DIASTOLIC BLOOD PRESSURE: 86 MMHG | HEART RATE: 58 BPM | SYSTOLIC BLOOD PRESSURE: 165 MMHG

## 2017-04-08 VITALS — RESPIRATION RATE: 19 BRPM | SYSTOLIC BLOOD PRESSURE: 158 MMHG | HEART RATE: 64 BPM | DIASTOLIC BLOOD PRESSURE: 61 MMHG

## 2017-04-08 VITALS — DIASTOLIC BLOOD PRESSURE: 60 MMHG | SYSTOLIC BLOOD PRESSURE: 131 MMHG | RESPIRATION RATE: 20 BRPM

## 2017-04-08 VITALS — DIASTOLIC BLOOD PRESSURE: 57 MMHG | SYSTOLIC BLOOD PRESSURE: 162 MMHG | RESPIRATION RATE: 14 BRPM | HEART RATE: 60 BPM

## 2017-04-08 VITALS — HEART RATE: 60 BPM | RESPIRATION RATE: 17 BRPM | DIASTOLIC BLOOD PRESSURE: 82 MMHG | SYSTOLIC BLOOD PRESSURE: 164 MMHG

## 2017-04-08 VITALS — SYSTOLIC BLOOD PRESSURE: 156 MMHG | RESPIRATION RATE: 18 BRPM | HEART RATE: 63 BPM | DIASTOLIC BLOOD PRESSURE: 68 MMHG

## 2017-04-08 VITALS — RESPIRATION RATE: 19 BRPM | HEART RATE: 64 BPM | SYSTOLIC BLOOD PRESSURE: 142 MMHG | DIASTOLIC BLOOD PRESSURE: 51 MMHG

## 2017-04-08 VITALS — DIASTOLIC BLOOD PRESSURE: 51 MMHG | RESPIRATION RATE: 19 BRPM | SYSTOLIC BLOOD PRESSURE: 136 MMHG | HEART RATE: 64 BPM

## 2017-04-08 VITALS — HEART RATE: 68 BPM | RESPIRATION RATE: 15 BRPM | SYSTOLIC BLOOD PRESSURE: 153 MMHG | DIASTOLIC BLOOD PRESSURE: 54 MMHG

## 2017-04-08 LAB
ADD SCAN DIFF: NO
ANION GAP SERPL CALC-SCNC: 11 MMOL/L (ref 8–16)
BASOPHILS # BLD AUTO: 0 10^3/UL (ref 0–0.1)
BASOPHILS NFR BLD: 0.2 % (ref 0–2)
BUN SERPL-MCNC: 8 MG/DL (ref 7–20)
CALCIUM SERPL-MCNC: 7.9 MG/DL (ref 8.4–10.2)
CHLORIDE SERPL-SCNC: 106 MMOL/L (ref 97–110)
CO2 SERPL-SCNC: 24 MMOL/L (ref 21–31)
CREAT SERPL-MCNC: 0.47 MG/DL (ref 0.44–1)
EOSINOPHIL # BLD: 0.1 10^3/UL (ref 0–0.5)
EOSINOPHIL NFR BLD: 0.4 % (ref 0–7)
ERYTHROCYTE [DISTWIDTH] IN BLOOD BY AUTOMATED COUNT: 16.3 % (ref 11.5–14.5)
GLUCOSE SERPL-MCNC: 142 MG/DL (ref 70–220)
HCT VFR BLD CALC: 28.6 % (ref 37–47)
HGB BLD-MCNC: 9.2 G/DL (ref 12–16)
LYMPHOCYTES # BLD AUTO: 1.4 10^3/UL (ref 0.8–2.9)
LYMPHOCYTES NFR BLD AUTO: 10.1 % (ref 15–51)
MCH RBC QN AUTO: 27.1 PG (ref 29–33)
MCHC RBC AUTO-ENTMCNC: 32.2 G/DL (ref 32–37)
MCV RBC AUTO: 84.1 FL (ref 82–101)
MONOCYTES # BLD: 1.2 10^3/UL (ref 0.3–0.9)
MONOCYTES NFR BLD: 9 % (ref 0–11)
NEUTROPHILS # BLD: 10.9 10^3/UL (ref 1.6–7.5)
NEUTROPHILS NFR BLD AUTO: 79.8 % (ref 39–77)
NRBC # BLD MANUAL: 0 10^3/UL (ref 0–0)
NRBC BLD QL: 0 /100WBC (ref 0–0)
PLATELET # BLD: 389 10^3/UL (ref 140–415)
PMV BLD AUTO: 9.5 FL (ref 7.4–10.4)
POTASSIUM SERPL-SCNC: 3 MMOL/L (ref 3.5–5.1)
RBC # BLD AUTO: 3.4 10^6/UL (ref 4.2–5.4)
SODIUM SERPL-SCNC: 138 MMOL/L (ref 135–144)
WBC # BLD AUTO: 13.6 10^3/UL (ref 4.8–10.8)

## 2017-04-08 PROCEDURE — 0F9C8ZZ DRAINAGE OF AMPULLA OF VATER, VIA NATURAL OR ARTIFICIAL OPENING ENDOSCOPIC: ICD-10-PCS

## 2017-04-08 PROCEDURE — BF111ZZ FLUOROSCOPY OF BILIARY AND PANCREATIC DUCTS USING LOW OSMOLAR CONTRAST: ICD-10-PCS

## 2017-04-08 PROCEDURE — 0F798DZ DILATION OF COMMON BILE DUCT WITH INTRALUMINAL DEVICE, VIA NATURAL OR ARTIFICIAL OPENING ENDOSCOPIC: ICD-10-PCS | Performed by: INTERNAL MEDICINE

## 2017-04-08 RX ADMIN — ACARBOSE SCH MG: 50 TABLET ORAL at 11:30

## 2017-04-08 RX ADMIN — DEXAMETHASONE SODIUM PHOSPHATE PRN MG: 10 INJECTION, SOLUTION INTRAMUSCULAR; INTRAVENOUS at 15:40

## 2017-04-08 RX ADMIN — LEVOTHYROXINE SODIUM SCH MCG: 50 TABLET ORAL at 05:28

## 2017-04-08 RX ADMIN — ASCORBIC ACID, VITAMIN A PALMITATE, CHOLECALCIFEROL, THIAMINE HYDROCHLORIDE, RIBOFLAVIN-5 PHOSPHATE SODIUM, PYRIDOXINE HYDROCHLORIDE, NIACINAMIDE, DEXPANTHENOL, ALPHA-TOCOPHEROL ACETATE, VITAMIN K1, FOLIC ACID, BIOTIN, CYANOCOBALAMIN SCH MLS/HR: 200; 3300; 200; 6; 3.6; 6; 40; 15; 10; 150; 600; 60; 5 INJECTION, SOLUTION INTRAVENOUS at 19:33

## 2017-04-08 RX ADMIN — Medication SCH MLS/HR: at 05:30

## 2017-04-08 RX ADMIN — Medication SCH MLS/HR: at 21:56

## 2017-04-08 RX ADMIN — ASCORBIC ACID, VITAMIN A PALMITATE, CHOLECALCIFEROL, THIAMINE HYDROCHLORIDE, RIBOFLAVIN-5 PHOSPHATE SODIUM, PYRIDOXINE HYDROCHLORIDE, NIACINAMIDE, DEXPANTHENOL, ALPHA-TOCOPHEROL ACETATE, VITAMIN K1, FOLIC ACID, BIOTIN, CYANOCOBALAMIN SCH MLS/HR: 200; 3300; 200; 6; 3.6; 6; 40; 15; 10; 150; 600; 60; 5 INJECTION, SOLUTION INTRAVENOUS at 08:48

## 2017-04-08 RX ADMIN — CEFTRIAXONE SCH MLS/HR: 1 INJECTION, SOLUTION INTRAVENOUS at 09:37

## 2017-04-08 RX ADMIN — VALSARTAN SCH MG: 160 TABLET, FILM COATED ORAL at 08:21

## 2017-04-08 RX ADMIN — Medication SCH MLS/HR: at 15:40

## 2017-04-08 RX ADMIN — ACARBOSE SCH MG: 50 TABLET ORAL at 18:07

## 2017-04-08 RX ADMIN — ACARBOSE SCH MG: 50 TABLET ORAL at 07:35

## 2017-04-08 NOTE — RADRPT
PROCEDURE:   Intraoperative imaging for ERCP with fluoroscopy.  

 

CLINICAL INDICATION:   Right upper quadrant pain.  Intraoperative. 

 

TECHNIQUE:   13 images of the right upper quadrant of the abdomen were obtained in the operating chantale
m with an image intensifier.  No radiologist was in attendance.  2 minutes, 44 seconds of fluoroscop
y time was used. 

 

COMPARISON:   MRCP dated 04/07/2017. 

 

FINDINGS:

Images demonstrate the endoscope in position.  Contrast was injected into the common bile duct and a
 common bile duct stent was placed.  The pancreatic duct was not injected.

 

IMPRESSION:

1. ERCP as described above.

 

RPTAT: QQ

_____________________________________________ 

.Rajesh Edwards MD, MD           Date    Time 

Electronically viewed and signed by .Rajesh Edwards MD, MD on 04/08/2017 13:19 

 

D:  04/08/2017 13:19  T:  04/08/2017 13:19

.R/

## 2017-04-08 NOTE — CONS
DATE OF ADMISSION: 2017

DATE OF CONSULTATION:  2017

 

 

 

TYPE OF CONSULTATION:  Surgical.

 

REFERRING PHYSICIAN:

1.  Dr. Koby Knapp.

2.  Dulce Barfield NP.

 

CHIEF COMPLAINT:

1.  Abdominal pain.

2.  Significant leukocytosis.

3.  Possible cholecystitis (possibly acalculous).

4.  Dilated biliary tree, possible obstruction.

5.  Anemia.

6.  Abnormal and elevated alkaline phosphatase.

 

HISTORY OF PRESENT ILLNESS:  Makayla Diaz is an 81-year-old female with multiple comorbidities 
who presents with 3 days of abdominal pain, worse in the past day, associated with nausea but no vom
iting, fevers or chills.  No chest pain or shortness of breath.  No visual or neurologic changes.  N
o dysuria or vaginal discharge.  No trauma or sick contacts.  No cough.  Positive bowel function.

 

In the emergency room, she was found to be afebrile with stable vitals.  White count, however, is el
evated at 20,000.  Sodium is low at 134, potassium low at 3.3.  Alkaline phosphatase is elevated at 
182.  Urine is negative for leukocytes or nitrites.

 

The patient had an abdominal ultrasound which does not identify cholelithiasis or acute cholecystiti
s; however, there is common bile duct dilatation to 9 mm.  The patient had a CT scan of the abdomen 
and pelvis with identification of a markedly distended gallbladder with mild surrounding inflammatio
n suggestive of acute cholecystitis, diverticulosis and multiple bilateral renal cortical and parape
lvic cysts.  There is aortoiliac atherosclerosis and moderate hiatal hernia with bronchiectasis of t
he right middle lobe and left lingular segment.  

 

The patient also had an MRCP which reports gallbladder distention with wall thickening and trace per
icholecystic fluid.  No other significant gallstones are seen.  On the MRCP report, there is no bili
honorio dilatation and choledocholithiasis.  However, per my review, there is some dilation with possibl
e papillary area paucity of contrast, which may suggest a lesion or stone.  There is moderately larg
e  hiatal hernia, colonic diverticulosis and distended bladder.  Surgical consult was obtained for f
urther evaluation and treatment.

 

PAST MEDICAL HISTORY

1.  Prediabetes.

2.  Hypertension.

3.  Diverticulosis.

4.  Bilateral renal cortical and parapelvic cysts.

5.  Aortoiliac atherosclerosis.

6.  Hiatal hernia.

7.  Bronchiectasis.  

8.  Possibly dilated biliary tree with filling defect.

9.  Significant leukocytosis.

10.  Hyponatremia.

11.  Hyperkalemia.

12.  Elevated alkaline phosphatase.

13.  Anemia.

14.  Hypothyroidism.

15.  Gastritis.

16.  Arthritis.

 

PAST SURGICAL HISTORY:  Colonoscopy.

 

MEDICATIONS:  As per MAR includin.  Methotrexate. 

2.  Cortisone shots.

 

FAMILY HISTORY:  Noncontributory.

 

SOCIAL HISTORY:  Denies alcohol, drugs or tobacco.

 

REVIEW OF SYSTEMS:  A 12-point of systems was negative unless addressed in the HPI.  No significant 
weight changes.

 

PHYSICAL EXAMINATION:

VITAL SIGNS:  Temperature 99.1, pulse 81, blood pressure 137/64, satting 95% on room air.

GENERAL:  No acute distress, however, uncomfortable.

HEENT:  Pupils equal, reactive.  No scleral icterus.  Mucous membranes are moist.

NECK:  Supple, no JVD, no lymphadenopathy.  

PULMONARY:  Normal respiratory effort. No wheezing.

HEART:  S1, S2 present and regular.

ABDOMEN:  Soft, tender in her right upper quadrant without Wong's.  No rebound, no guarding,  not 
rigid, but somewhat distended.

EXTREMITIES:  No edema.

VASCULAR:  Cap refill less than 2 seconds.

NEUROLOGIC:  Alert, oriented, moves all 4 extremities grossly.

 

LABORATORY AND RADIOGRAPHIC:  As per chart and HPI.

 

ASSESSMENT AND PLAN:  Makayla Diaz is an 81-year-old female with multiple comorbidities.

1.  Abdominal pain, leukocytosis, and multiple imagings are suggestive of a cholecystitis, possibly 
acalculous; however, may need to rule out cholangitis as well.  Continue antibiotics, supportive car
e, and based on my read, and Dr. Knapp's reads of the MRCP, the patient will benefit from an ERCP f
or further investigation.

2.  Possibly dilated biliary tree per some of the imagings. As above, we will proceed with ERCP per 
GI. 

3.  Significant leukocytosis secondary to above.  Continue antibiotics and supportive care and treat
ment as above.

4.  Hypertension.  Continue nutrition and medication optimization.

5.  Prediabetes.  Continue nutrition and medication optimization.

6.  Rheumatoid arthritis, on methotrexate and steroids shots.  Continue medical optimization.

7.  Hypothyroidism.  Continue Synthroid.

8.  Hyperlipidemia.  Continue diet and medication optimization.

9.  History of gastritis.  Continue proton pump inhibitor and encourage nutritional and lifestyle op
timization.

10.  Anemia without evidence of acute blood loss, continue monitoring.

11.  Electrolyte abnormalities with hypokalemia and hyponatremia.  Please correct with fluid and nut
ritional management.

 

Thank you very much for consulting me in this patient's care.

 

 

Dictated By: LIANET PETER/GRAHAM

DD:    2017 19:51:00

DT:    2017 05:06:30

Conf#: [id number]

DID#:  [id number]

## 2017-04-09 VITALS — SYSTOLIC BLOOD PRESSURE: 145 MMHG | DIASTOLIC BLOOD PRESSURE: 72 MMHG

## 2017-04-09 VITALS — RESPIRATION RATE: 18 BRPM | DIASTOLIC BLOOD PRESSURE: 79 MMHG | SYSTOLIC BLOOD PRESSURE: 181 MMHG

## 2017-04-09 VITALS — DIASTOLIC BLOOD PRESSURE: 63 MMHG | RESPIRATION RATE: 18 BRPM | SYSTOLIC BLOOD PRESSURE: 139 MMHG

## 2017-04-09 LAB
ADD SCAN DIFF: NO
ANION GAP SERPL CALC-SCNC: 11 MMOL/L (ref 8–16)
BASOPHILS # BLD AUTO: 0 10^3/UL (ref 0–0.1)
BASOPHILS NFR BLD: 0.3 % (ref 0–2)
BUN SERPL-MCNC: 8 MG/DL (ref 7–20)
CALCIUM SERPL-MCNC: 8.1 MG/DL (ref 8.4–10.2)
CHLORIDE SERPL-SCNC: 106 MMOL/L (ref 97–110)
CO2 SERPL-SCNC: 25 MMOL/L (ref 21–31)
CREAT SERPL-MCNC: 0.5 MG/DL (ref 0.44–1)
EOSINOPHIL # BLD: 0.1 10^3/UL (ref 0–0.5)
EOSINOPHIL NFR BLD: 1.1 % (ref 0–7)
ERYTHROCYTE [DISTWIDTH] IN BLOOD BY AUTOMATED COUNT: 16.3 % (ref 11.5–14.5)
GLUCOSE SERPL-MCNC: 144 MG/DL (ref 70–220)
HCT VFR BLD CALC: 28.7 % (ref 37–47)
HGB BLD-MCNC: 9.3 G/DL (ref 12–16)
LYMPHOCYTES # BLD AUTO: 1 10^3/UL (ref 0.8–2.9)
LYMPHOCYTES NFR BLD AUTO: 9 % (ref 15–51)
MCH RBC QN AUTO: 27 PG (ref 29–33)
MCHC RBC AUTO-ENTMCNC: 32.4 G/DL (ref 32–37)
MCV RBC AUTO: 83.2 FL (ref 82–101)
MONOCYTES # BLD: 0.9 10^3/UL (ref 0.3–0.9)
MONOCYTES NFR BLD: 8 % (ref 0–11)
NEUTROPHILS # BLD: 9 10^3/UL (ref 1.6–7.5)
NEUTROPHILS NFR BLD AUTO: 81.2 % (ref 39–77)
NRBC # BLD MANUAL: 0 10^3/UL (ref 0–0)
NRBC BLD QL: 0 /100WBC (ref 0–0)
PLATELET # BLD: 386 10^3/UL (ref 140–415)
PMV BLD AUTO: 9.5 FL (ref 7.4–10.4)
POTASSIUM SERPL-SCNC: 3.1 MMOL/L (ref 3.5–5.1)
RBC # BLD AUTO: 3.45 10^6/UL (ref 4.2–5.4)
SODIUM SERPL-SCNC: 139 MMOL/L (ref 135–144)
WBC # BLD AUTO: 11 10^3/UL (ref 4.8–10.8)

## 2017-04-09 RX ADMIN — CEFTRIAXONE SCH MLS/HR: 1 INJECTION, SOLUTION INTRAVENOUS at 11:08

## 2017-04-09 RX ADMIN — LEVOTHYROXINE SODIUM SCH MCG: 50 TABLET ORAL at 06:00

## 2017-04-09 RX ADMIN — HYDRALAZINE HYDROCHLORIDE PRN MG: 20 INJECTION INTRAMUSCULAR; INTRAVENOUS at 22:38

## 2017-04-09 RX ADMIN — Medication SCH MLS/HR: at 06:00

## 2017-04-09 RX ADMIN — ACARBOSE SCH MG: 50 TABLET ORAL at 17:36

## 2017-04-09 RX ADMIN — ACARBOSE SCH MG: 50 TABLET ORAL at 12:08

## 2017-04-09 RX ADMIN — ACARBOSE SCH MG: 50 TABLET ORAL at 09:20

## 2017-04-09 RX ADMIN — PANTOPRAZOLE SODIUM SCH MG: 40 TABLET, DELAYED RELEASE ORAL at 06:00

## 2017-04-09 RX ADMIN — Medication SCH MLS/HR: at 22:39

## 2017-04-09 RX ADMIN — DEXAMETHASONE SODIUM PHOSPHATE PRN MG: 10 INJECTION, SOLUTION INTRAMUSCULAR; INTRAVENOUS at 00:34

## 2017-04-09 RX ADMIN — MORPHINE SULFATE PRN MG: 2 INJECTION, SOLUTION INTRAMUSCULAR; INTRAVENOUS at 22:39

## 2017-04-09 RX ADMIN — DOCUSATE SODIUM SCH MG: 100 CAPSULE, LIQUID FILLED ORAL at 22:38

## 2017-04-09 RX ADMIN — VALSARTAN SCH MG: 160 TABLET, FILM COATED ORAL at 09:20

## 2017-04-09 RX ADMIN — ASCORBIC ACID, VITAMIN A PALMITATE, CHOLECALCIFEROL, THIAMINE HYDROCHLORIDE, RIBOFLAVIN-5 PHOSPHATE SODIUM, PYRIDOXINE HYDROCHLORIDE, NIACINAMIDE, DEXPANTHENOL, ALPHA-TOCOPHEROL ACETATE, VITAMIN K1, FOLIC ACID, BIOTIN, CYANOCOBALAMIN SCH MLS/HR: 200; 3300; 200; 6; 3.6; 6; 40; 15; 10; 150; 600; 60; 5 INJECTION, SOLUTION INTRAVENOUS at 13:24

## 2017-04-09 RX ADMIN — Medication SCH MLS/HR: at 15:49

## 2017-04-09 NOTE — PN
Date/Time of Note


Date/Time of Note


DATE: 4/9/17 


TIME: 20:35





Assessment/Plan


Lines/Catheters


IV Catheter Type (from [facility]):  Peripheral IV


Riojas in Place (from [facility]):  No





Assessment/Plan


Chief Complaint/Hosp Course


1.  Abdominal pain, & leukocytosis 2nd Cholangitis and ? Cholecystitis (

probably acalculous) s/p ERCP 4/8.  Improving.


-antibiotics


-supportive care


-patient and daughter not eager for surgery if it can be avoided.  since 

leukocytosis resolving and pain improving, we maybe able to avoid surgery at 

this point.  however, i advised that if not improving or worsening needs to 

proceed with surgery.  i also advised that recommendation is to proceed with 

surgery at this point based on her clinical picture but once again they prefer 

to wait since she is improving.


2.  Sepsis 2nd above.  Improving


-as above


3. Electrolyte abnormalities with hypokalemia.  Please correct with fluid and 

nutritional management.


4.  Hypertension.  Continue nutrition and medication optimization.


5.  Prediabetes.  Continue nutrition and medication optimization.


6.  Rheumatoid arthritis, on methotrexate and steroids shots.  Continue medical 

optimization.


7.  Hypothyroidism.  Continue Synthroid.


8.  Hyperlipidemia.  Continue diet and medication optimization.


9.  History of gastritis.  Continue proton pump inhibitor and encourage 

nutritional and lifestyle optimization.


10.  Anemia without evidence of acute blood loss, continue monitoring.


 


Thank you


Problems:  





Subjective


24 Hr Interval Summary


s/p ERCP and drainage of pus by Dr. Knapp 4/8.  No f/c.  No cough.  No sz.  No 

rash.  No n/v currently but vomited after ERCP.  No cp/sob.  Min pain (10>5).  

Bowel function.  Patient and daughter not eager for surgery if it can be 

avoided.





Exam/Review of Systems


Vital Signs


Vitals





 Vital Signs








  Date Time  Temp Pulse Resp B/P Pulse Ox O2 Delivery O2 Flow Rate FiO2


 


4/9/17 21:25 98.4 68 18 181/79 97   


 


4/8/17 14:01      Nasal Cannula 2.0 














 Intake and Output   


 


 4/8/17 4/8/17 4/9/17





 15:00 23:00 07:00


 


Intake Total 50 ml 750 ml 750 ml


 


Balance 50 ml 750 ml 750 ml











Exam


Free Text/Dictation


GENERAL:  No acute distress, however, uncomfortable.


HEENT:  Pupils equal, reactive.  No scleral icterus.  Mucous membranes are 

moist.


NECK:  Supple, no JVD, no lymphadenopathy.  


PULMONARY:  Normal respiratory effort. No wheezing.


HEART:  S1, S2 present and regular.


ABDOMEN:  Soft, min tender in her right upper quadrant without Wong's.  No 

rebound, no guarding,  not rigid, but somewhat distended.


EXTREMITIES:  No edema.


VASCULAR:  Cap refill less than 2 seconds.


NEUROLOGIC:  Alert, oriented, moves all 4 extremities grossly.





Results


Result Diagram:  


4/9/17 0427                                                                    

            4/9/17 0427














LIANET GROVER MD Apr 9, 2017 20:36

## 2017-04-09 NOTE — PN
Date/Time of Note


Date/Time of Note


DATE: 4/8/17 


TIME: 20:29





Assessment/Plan


Lines/Catheters


IV Catheter Type (from [facility]):  Peripheral IV


Riojas in Place (from [facility]):  No





Assessment/Plan


Chief Complaint/Hosp Course


1.  Abdominal pain, & leukocytosis 2nd Cholangitis and ? Cholecystitis (

probably acalculous) s/p ERCP 4/8.


-antibiotics


-supportive care


-lap tegan may help but will allow her to become more optimized prior to 

surgery since acute process relieved by ERCP


2.  Sepsis 2nd above.  Improving


-as above


3. Electrolyte abnormalities with hypokalemia.  Please correct with fluid and 

nutritional management.


4.  Hypertension.  Continue nutrition and medication optimization.


5.  Prediabetes.  Continue nutrition and medication optimization.


6.  Rheumatoid arthritis, on methotrexate and steroids shots.  Continue medical 

optimization.


7.  Hypothyroidism.  Continue Synthroid.


8.  Hyperlipidemia.  Continue diet and medication optimization.


9.  History of gastritis.  Continue proton pump inhibitor and encourage 

nutritional and lifestyle optimization.


10.  Anemia without evidence of acute blood loss, continue monitoring.


 


Thank you





Late entry 4/8


Problems:  





Subjective


24 Hr Interval Summary


s/p ERCP and drainage of pus by Dr. Knapp 4/8.  Fevers improved.  No chills.  

No cough.  No sz.  No rash.  No n/v.  No cp/sob.  Min pain.





Exam/Review of Systems


Vital Signs


Vitals





 Vital Signs








  Date Time  Temp Pulse Resp B/P Pulse Ox O2 Delivery O2 Flow Rate FiO2


 


4/9/17 08:00 98.4 79 18 139/63 95   


 


4/8/17 14:01      Nasal Cannula 2.0 














 Intake and Output   


 


 4/8/17 4/8/17 4/9/17





 15:00 23:00 07:00


 


Intake Total 50 ml 750 ml 750 ml


 


Balance 50 ml 750 ml 750 ml











Exam


Free Text/Dictation


GENERAL:  No acute distress, however, uncomfortable.


HEENT:  Pupils equal, reactive.  No scleral icterus.  Mucous membranes are 

moist.


NECK:  Supple, no JVD, no lymphadenopathy.  


PULMONARY:  Normal respiratory effort. No wheezing.


HEART:  S1, S2 present and regular.


ABDOMEN:  Soft, min tender in her right upper quadrant without Wong's.  No 

rebound, no guarding,  not rigid, but somewhat distended.


EXTREMITIES:  No edema.


VASCULAR:  Cap refill less than 2 seconds.


NEUROLOGIC:  Alert, oriented, moves all 4 extremities grossly.





Results


Result Diagram:  


4/9/17 0427                                                                    

            4/9/17 0427














LIANET GROVER MD Apr 9, 2017 20:35

## 2017-04-09 NOTE — PN
Date/Time of Note


Date/Time of Note


DATE: 4/9/17 


TIME: 19:08





Assessment/Plan


Lines/Catheters


IV Catheter Type (from [facility]):  Peripheral IV


Urinary Cath still in place:  No





Assessment/Plan


Assessment/Plan


1.  Possible acute cholecystitis.  


   - per GI


   -sp ERCP/Stent placement


2.  Systemic inflammatory response syndrome with leukocytosis secondary to 

acute cholecystitis.  Continue antibiotics.  Will obtain blood cultures to rule 

out bacteremia.


3.  Rule out choledocholithiasis.  The patient will undergo MRCP.  Dr. Knapp 

was asked to see patient in gastroenterology consultation.  We will ask Dr. Edwards to see patient in general surgery consultation.


4.  Hypertension.  We will continue the patient on Diovan


5.  Hydralazine p.r.n. for systolic blood pressure above 170.


6.  Rheumatoid arthritis.  Continue methotrexate.


7.  Hypoparathyroidism.  Continue Synthroid.


8.  Hyperlipidemia.  Continue statin.


9.  Gastritis by history.  


   - Protonix for peptic ulcer disease prophylaxis. 


10. Hypokalemia- replet K, am labs


10.Sequential compression device for deep venous thrombosis prophylaxis.  





Further recommendations based on clinical course.  Plan of care discussed with 

Dr. Brooks.





Subjective


24 Hr Interval Summary


Eyes:  no complaints


ENT:  no complaints


Respiratory:  no complaints


Cardiovascular:  no complaints


Gastrointestinal:  pain


Genitourinary:  no complaints


Musculoskeletal:  no complaints


Skin:  no complaints


Neurologic:  no complaints


Endocrine:  no complaints


Lymphatic:  no complaints


Psychological:  no complaints


Immunologic:  no complaints





Exam/Review of Systems


Vital Signs


Vitals





 Vital Signs








  Date Time  Temp Pulse Resp B/P Pulse Ox O2 Delivery O2 Flow Rate FiO2


 


4/9/17 08:00 98.4 79 18 139/63 95   


 


4/8/17 14:01      Nasal Cannula 2.0 














 Intake and Output   


 


 4/8/17 4/8/17 4/9/17





 15:00 23:00 07:00


 


Intake Total 50 ml 750 ml 750 ml


 


Balance 50 ml 750 ml 750 ml











Exam


Constitutional:  alert, oriented


Psych:  nl mood/affect


Head:  atraumatic


Eyes:  EOMI


ENMT:  nl external ears & nose


Neck:  non-tender


Respiratory:  clear to auscultation


Cardiovascular:  nl pulses


Gastrointestinal:  soft, tender (diffuse )


Musculoskeletal:  nl extremities to inspection


Extremities:  normal pulses


Neurological:  nl mental status, nl speech


Skin:  nl turgor


Lymph:  nontender





Results


Result Diagram:  


4/9/17 0427                                                                    

            4/9/17 0427





Results 24 hrs





Laboratory Tests








Test


  4/9/17


04:27


 


White Blood Count 11.0  H


 


Red Blood Count 3.45  L


 


Hemoglobin 9.3  L


 


Hematocrit 28.7  L


 


Mean Corpuscular Volume 83.2  


 


Mean Corpuscular Hemoglobin 27.0  L


 


Mean Corpuscular Hemoglobin


Concent 32.4  


 


 


Red Cell Distribution Width 16.3  H


 


Platelet Count 386  


 


Mean Platelet Volume 9.5  


 


Neutrophils % 81.2  H


 


Lymphocytes % 9.0  L


 


Monocytes % 8.0  


 


Eosinophils % 1.1  


 


Basophils % 0.3  


 


Nucleated Red Blood Cells % 0.0  


 


Neutrophils # 9.0  H


 


Lymphocytes # 1.0  


 


Monocytes # 0.9  


 


Eosinophils # 0.1  


 


Basophils # 0.0  


 


Nucleated Red Blood Cells # 0.0  


 


Sodium Level 139  


 


Potassium Level 3.1  L


 


Chloride Level 106  


 


Carbon Dioxide Level 25  


 


Anion Gap 11  


 


Blood Urea Nitrogen 8  


 


Creatinine 0.50  


 


Glucose Level 144  


 


Calcium Level 8.1  L











Medications


Medications





 Current Medications


Acetaminophen (Tylenol Supp) 650 mg Q6H  PRN WI fever;  Start 4/7/17 at 18:00


Morphine Sulfate (morphine) 1 mg Q4H  PRN IV PAIN Last administered on 4/7/17at 

21:51; Admin Dose 1 MG;  Start 4/7/17 at 18:00


Levothyroxine Sodium (Synthroid) 50 mcg DAILY@06 PO  Last administered on 4/9/ 17at 06:00; Admin Dose 50 MCG;  Start 4/8/17 at 06:00


Valsartan (Diovan) 160 mg DAILY PO  Last administered on 4/9/17at 09:20; Admin 

Dose 160 MG;  Start 4/8/17 at 09:00


Miscellaneous Information 1 ea NOTE XX ;  Start 4/7/17 at 18:00


Glucose (Glutose) 15 gm Q15M  PRN PO DECREASED GLUCOSE;  Start 4/7/17 at 18:00


Glucose (Glutose) 22.5 gm Q15M  PRN PO DECREASED GLUCOSE;  Start 4/7/17 at 18:00


Dextrose (D50w Syringe) 25 ml Q15M  PRN IV DECREASED GLUCOSE;  Start 4/7/17 at 

18:00


Dextrose (D50w Syringe) 50 ml Q15M  PRN IV DECREASED GLUCOSE;  Start 4/7/17 at 

18:00


Glucagon (Glucagen) 1 mg Q15M  PRN IM DECREASED GLUCOSE;  Start 4/7/17 at 18:00


Glucose 15 gm 15 gm Q15M  PRN BUCCAL DECREASED GLUCOSE;  Start 4/7/17 at 18:00


Potassium Chloride/Dextrose/ Sod Cl (D5-NS + KCl 20 Meq) 1,000 ml @  70 mls/hr 

[id number] IV  Last administered on 4/8/17at 19:33; Admin Dose 70 MLS/HR;  Start 4/ 7/17 at 18:30


Levothyroxine Sodium (Synthroid Iv) 25 mcg DAILY@06 IV  Last administered on 4/8 /17at 05:32; Admin Dose 25 MCG;  Start 4/8/17 at 06:00;  Status Future Hold


Hydralazine HCl (Apresoline) 10 mg Q6H  PRN IV SBP>170;  Start 4/7/17 at 18:30


Ondansetron HCl (Zofran Inj) 4 mg Q6H  PRN IV NAUSEA AND/OR VOMITING Last 

administered on 4/9/17at 00:34; Admin Dose 4 MG;  Start 4/7/17 at 18:30


Morphine Sulfate 2 mg 2 mg Q4H  PRN IV PAIN;  Start 4/7/17 at 18:30


Ceftriaxone Sodium 50 ml @  100 mls/hr Q24H IVPB  Last administered on 4/9/17at 

11:08; Admin Dose 100 MLS/HR;  Start 4/8/17 at 10:00


Metronidazole (Flagyl 500 Mg (Pmx)) 100 ml @  100 mls/hr Q8 IVPB  Last 

administered on 4/9/17at 15:49; Admin Dose 100 MLS/HR;  Start 4/7/17 at 22:00


Pantoprazole (Protonix Tab) 40 mg DAILY@06 PO  Last administered on 4/9/17at 06:

00; Admin Dose 40 MG;  Start 4/9/17 at 06:00


Al Hydrox/Mg Hydrox/Simethicone (Mag-Al Plus) 30 ml Q6H  PRN PO 

GASTROINTESTINAL UPSET Last administered on 4/9/17at 09:26; Admin Dose 30 ML;  

Start 4/8/17 at 21:00


Docusate Sodium (Colace) 100 mg BID PO ;  Start 4/9/17 at 21:00











CRISPIN RAMIREZ Apr 9, 2017 19:09

## 2017-04-09 NOTE — GILP
DATE OF PROCEDURE:  

 

 

PROCEDURE:  Endoscopic retrograde cholangiopancreatography.

 

PREOPERATIVE DIAGNOSIS:  Patient presenting with a history of cholangitis and severe sepsis.  Ultras
ound and CAT scan showed a dilated gallbladder with pericholecystic fluid and thickening of the gall
bladder, a dilated common bile duct up to 9 mm noted.  MRCP was not very helpful in this regard.

 

There is evidence of a lack of contrast in the distal part of the CBD.  Stone or tumor cannot be exc
luded, or a stricture.

 

POSTOPERATIVE DIAGNOSES:

1.  Pyobilia.

2.  Sludge and pus drained from the common bile duct.  

 

A 10 x 7 [place] type of CBD stent was placed.

 

DESCRIPTION OF PROCEDURE:  After the informed written consent was obtained, the patient was intubate
d by anesthesiologist, Dr. Osborne.  While the patient was in the prone position the Olympus video
 side-viewing duodenoscope was inserted into the oropharynx, then into the esophagus, subsequently i
nto the stomach, and then into the duodenum.  The ampulla was located in the normal location, with n
ormal morphology.  There is evidence of a large diverticulum noted in the second part of the duodenu
m.  By using the Dreamtome, cannulation of the common bile duct was performed.  There is evidence of
 no large filling defects noted, although there were some filling defects noted distally which may b
e air bubbles or which may be sludge.  At this time pus started draining out of the bile duct during
 this manipulation of the cannulation.  At this time no major filling defects were noted in the bile
 duct.  The gallbladder was visualized.  A speckled _____ appearance of the gallbladder was noted.  
This could be cholesterolosis, sludge, or adenomyosis; however, a sphincterotomy was performed. Abou
t an 8-mm cut of the ampulla was made by using the cutting wire of the Dreamtome.  Following the sph
incterotomy the balloon was inserted into the common hepatic duct and balloon sweeping was performed
.  A large amount of pus was drained from the common bile duct, also mixed with sludge.  At the end 
of multiple sweepings no more filling defects were noted and at this time a 10 x 7 [place]-type of
 endobiliary prosthesis was inserted into the bile duct, across the ampulla, into the duodenum.  Diamond
tographs were obtained and the procedure was terminated.

 

PLAN:  Recommend continued antibiotic therapy and recommend to proceed with a laparoscopic cholecyst
ectomy.

 

 

Dictated By: LILLIAM CRENSHAW MD

 

NC/NTS

DD:    04/08/2017 12:06:38

DT:    04/08/2017 12:36:28

Conf#: [id number]

DID#:  [id number]

CC: LIANET GROVER MD;*EndCC*

## 2017-04-10 VITALS — DIASTOLIC BLOOD PRESSURE: 76 MMHG | SYSTOLIC BLOOD PRESSURE: 178 MMHG | RESPIRATION RATE: 19 BRPM

## 2017-04-10 VITALS — DIASTOLIC BLOOD PRESSURE: 74 MMHG | SYSTOLIC BLOOD PRESSURE: 149 MMHG

## 2017-04-10 VITALS — DIASTOLIC BLOOD PRESSURE: 76 MMHG | SYSTOLIC BLOOD PRESSURE: 164 MMHG | RESPIRATION RATE: 18 BRPM

## 2017-04-10 LAB
ADD SCAN DIFF: NO
ALBUMIN SERPL-MCNC: 2.8 G/DL (ref 3.3–4.9)
ALP SERPL-CCNC: 238 IU/L (ref 42–121)
ALT SERPL-CCNC: 90 IU/L (ref 13–69)
ANION GAP SERPL CALC-SCNC: 7 MMOL/L (ref 8–16)
AST SERPL-CCNC: 29 IU/L (ref 15–46)
BASOPHILS # BLD AUTO: 0 10^3/UL (ref 0–0.1)
BASOPHILS NFR BLD: 0.4 % (ref 0–2)
BILIRUB DIRECT SERPL-MCNC: 0 MG/DL (ref 0–0.2)
BILIRUB SERPL-MCNC: 0.2 MG/DL (ref 0.2–1.3)
BUN SERPL-MCNC: 6 MG/DL (ref 7–20)
CALCIUM SERPL-MCNC: 8.4 MG/DL (ref 8.4–10.2)
CHLORIDE SERPL-SCNC: 107 MMOL/L (ref 97–110)
CO2 SERPL-SCNC: 23 MMOL/L (ref 21–31)
CREAT SERPL-MCNC: 0.45 MG/DL (ref 0.44–1)
EOSINOPHIL # BLD: 0.2 10^3/UL (ref 0–0.5)
EOSINOPHIL NFR BLD: 1.9 % (ref 0–7)
ERYTHROCYTE [DISTWIDTH] IN BLOOD BY AUTOMATED COUNT: 16.5 % (ref 11.5–14.5)
GLUCOSE SERPL-MCNC: 152 MG/DL (ref 70–220)
HCT VFR BLD CALC: 31.1 % (ref 37–47)
HGB BLD-MCNC: 9.9 G/DL (ref 12–16)
LYMPHOCYTES # BLD AUTO: 1.3 10^3/UL (ref 0.8–2.9)
LYMPHOCYTES NFR BLD AUTO: 12.6 % (ref 15–51)
MAGNESIUM SERPL-MCNC: 2.2 MG/DL (ref 1.7–2.5)
MCH RBC QN AUTO: 26.5 PG (ref 29–33)
MCHC RBC AUTO-ENTMCNC: 31.8 G/DL (ref 32–37)
MCV RBC AUTO: 83.4 FL (ref 82–101)
MONOCYTES # BLD: 0.8 10^3/UL (ref 0.3–0.9)
MONOCYTES NFR BLD: 7.8 % (ref 0–11)
NEUTROPHILS # BLD: 7.6 10^3/UL (ref 1.6–7.5)
NEUTROPHILS NFR BLD AUTO: 76.1 % (ref 39–77)
NRBC # BLD MANUAL: 0 10^3/UL (ref 0–0)
NRBC BLD QL: 0 /100WBC (ref 0–0)
PLATELET # BLD: 472 10^3/UL (ref 140–415)
PMV BLD AUTO: 8.9 FL (ref 7.4–10.4)
POTASSIUM SERPL-SCNC: 3.7 MMOL/L (ref 3.5–5.1)
PROT SERPL-MCNC: 5.9 G/DL (ref 6.1–8.1)
RBC # BLD AUTO: 3.73 10^6/UL (ref 4.2–5.4)
SODIUM SERPL-SCNC: 133 MMOL/L (ref 135–144)
WBC # BLD AUTO: 10 10^3/UL (ref 4.8–10.8)

## 2017-04-10 RX ADMIN — DEXAMETHASONE SODIUM PHOSPHATE PRN MG: 10 INJECTION, SOLUTION INTRAMUSCULAR; INTRAVENOUS at 18:10

## 2017-04-10 RX ADMIN — ACARBOSE SCH MG: 50 TABLET ORAL at 07:35

## 2017-04-10 RX ADMIN — Medication SCH MLS/HR: at 05:29

## 2017-04-10 RX ADMIN — HYDRALAZINE HYDROCHLORIDE PRN MG: 20 INJECTION INTRAMUSCULAR; INTRAVENOUS at 20:56

## 2017-04-10 RX ADMIN — DEXAMETHASONE SODIUM PHOSPHATE PRN MG: 10 INJECTION, SOLUTION INTRAMUSCULAR; INTRAVENOUS at 08:17

## 2017-04-10 RX ADMIN — Medication SCH MLS/HR: at 15:18

## 2017-04-10 RX ADMIN — ACARBOSE SCH MG: 50 TABLET ORAL at 18:10

## 2017-04-10 RX ADMIN — DOCUSATE SODIUM SCH MG: 100 CAPSULE, LIQUID FILLED ORAL at 20:42

## 2017-04-10 RX ADMIN — ASCORBIC ACID, VITAMIN A PALMITATE, CHOLECALCIFEROL, THIAMINE HYDROCHLORIDE, RIBOFLAVIN-5 PHOSPHATE SODIUM, PYRIDOXINE HYDROCHLORIDE, NIACINAMIDE, DEXPANTHENOL, ALPHA-TOCOPHEROL ACETATE, VITAMIN K1, FOLIC ACID, BIOTIN, CYANOCOBALAMIN SCH MLS/HR: 200; 3300; 200; 6; 3.6; 6; 40; 15; 10; 150; 600; 60; 5 INJECTION, SOLUTION INTRAVENOUS at 18:00

## 2017-04-10 RX ADMIN — VALSARTAN SCH MG: 160 TABLET, FILM COATED ORAL at 08:17

## 2017-04-10 RX ADMIN — ACARBOSE SCH MG: 50 TABLET ORAL at 12:47

## 2017-04-10 RX ADMIN — Medication SCH MLS/HR: at 21:38

## 2017-04-10 RX ADMIN — PANTOPRAZOLE SODIUM SCH MG: 40 TABLET, DELAYED RELEASE ORAL at 05:29

## 2017-04-10 RX ADMIN — HYDRALAZINE HYDROCHLORIDE PRN MG: 20 INJECTION INTRAMUSCULAR; INTRAVENOUS at 11:44

## 2017-04-10 RX ADMIN — CEFTRIAXONE SCH MLS/HR: 1 INJECTION, SOLUTION INTRAVENOUS at 11:44

## 2017-04-10 RX ADMIN — LEVOTHYROXINE SODIUM SCH MCG: 50 TABLET ORAL at 05:29

## 2017-04-10 RX ADMIN — DOCUSATE SODIUM SCH MG: 100 CAPSULE, LIQUID FILLED ORAL at 08:17

## 2017-04-10 RX ADMIN — DEXAMETHASONE SODIUM PHOSPHATE PRN MG: 10 INJECTION, SOLUTION INTRAMUSCULAR; INTRAVENOUS at 21:28

## 2017-04-10 RX ADMIN — ASCORBIC ACID, VITAMIN A PALMITATE, CHOLECALCIFEROL, THIAMINE HYDROCHLORIDE, RIBOFLAVIN-5 PHOSPHATE SODIUM, PYRIDOXINE HYDROCHLORIDE, NIACINAMIDE, DEXPANTHENOL, ALPHA-TOCOPHEROL ACETATE, VITAMIN K1, FOLIC ACID, BIOTIN, CYANOCOBALAMIN SCH MLS/HR: 200; 3300; 200; 6; 3.6; 6; 40; 15; 10; 150; 600; 60; 5 INJECTION, SOLUTION INTRAVENOUS at 23:30

## 2017-04-10 RX ADMIN — ASCORBIC ACID, VITAMIN A PALMITATE, CHOLECALCIFEROL, THIAMINE HYDROCHLORIDE, RIBOFLAVIN-5 PHOSPHATE SODIUM, PYRIDOXINE HYDROCHLORIDE, NIACINAMIDE, DEXPANTHENOL, ALPHA-TOCOPHEROL ACETATE, VITAMIN K1, FOLIC ACID, BIOTIN, CYANOCOBALAMIN SCH MLS/HR: 200; 3300; 200; 6; 3.6; 6; 40; 15; 10; 150; 600; 60; 5 INJECTION, SOLUTION INTRAVENOUS at 01:12

## 2017-04-10 RX ADMIN — ATORVASTATIN CALCIUM SCH MG: 80 TABLET, FILM COATED ORAL at 20:42

## 2017-04-10 NOTE — PN
Date/Time of Note


Date/Time of Note


DATE: 4/10/17 


TIME: 12:17





Assessment/Plan


Lines/Catheters


IV Catheter Type (from [facility]):  Peripheral IV


Riojas in Place (from [facility]):  No





Assessment/Plan


Chief Complaint/Hosp Course


1.  Abdominal pain, & leukocytosis 2nd Cholangitis and ? Cholecystitis (

probably acalculous) s/p ERCP 4/8.  Improving.


-antibiotics


-supportive care


-patient and daughter not eager for surgery if it can be avoided.  since 

leukocytosis resolving and pain improving, we maybe able to avoid surgery at 

this point.  however, i advised that if not improving or worsening needs to 

proceed with surgery.  i also advised that recommendation is to proceed with 

surgery at this point based on her clinical picture but once again they prefer 

to wait since she is improving.


2.  Sepsis 2nd above.  Improved


-as above


3. Electrolyte abnormalities with hypokalemia.  Please correct with fluid and 

nutritional management.


4.  Hypertension.  Continue nutrition and medication optimization.


5.  Prediabetes.  Continue nutrition and medication optimization.


6.  Rheumatoid arthritis, on methotrexate and steroids shots.  Continue medical 

optimization.


7.  Hypothyroidism.  Continue Synthroid.


8.  Hyperlipidemia.  Continue diet and medication optimization.


9.  History of gastritis.  Continue proton pump inhibitor and encourage 

nutritional and lifestyle optimization.


10.  Anemia without evidence of acute blood loss, continue monitoring.


 


Thank you


Problems:  





Subjective


24 Hr Interval Summary


Feels better overall and prefers to avoid surgery at this time.  s/p ERCP and 

drainage of pus by Dr. Knapp 4/8.  No f/c.  No cough.  No sz.  No rash.  No n/

v.  No cp/sob.  Min pain (10>3 or 4).  Bowel function.





Exam/Review of Systems


Vital Signs


Vitals





 Vital Signs








  Date Time  Temp Pulse Resp B/P Pulse Ox O2 Delivery O2 Flow Rate FiO2


 


4/10/17 08:42 98.1 75 18 164/76 98   


 


4/8/17 14:01      Nasal Cannula 2.0 














 Intake and Output   


 


 4/9/17 4/9/17 4/10/17





 15:00 23:00 07:00


 


Intake Total 300 ml 1440 ml 1340 ml


 


Balance 300 ml 1440 ml 1340 ml











Results


Result Diagram:  


4/10/17 0910                                                                   

             4/10/17 0910














LIANET GROVER MD Apr 10, 2017 12:18

## 2017-04-10 NOTE — PN
Date/Time of Note


Date/Time of Note


DATE: 4/10/17 


TIME: 20:36





Assessment/Plan


VTE Prophylaxis


VTE Prophylaxis Intervention:  SCD's





Lines/Catheters


IV Catheter Type (from [facility]):  Peripheral IV


Urinary Cath still in place:  No





Assessment/Plan


Assessment/Plan


1.  Possible acute cholecystitis.  


   - per GI


   -sp ERCP/Stent placement


2.  Systemic inflammatory response syndrome with leukocytosis secondary to 

acute cholecystitis.  Continue antibiotics.  Will obtain blood cultures to rule 

out bacteremia.


3.  Rule out choledocholithiasis.  The patient will undergo MRCP.  Dr. Knapp 

was asked to see patient in gastroenterology consultation.  We will ask Dr. Edwards to see patient in general surgery consultation.


4.  Hypertension.  We will continue the patient on Diovan


5.  Hydralazine p.r.n. for systolic blood pressure above 170.


6.  Rheumatoid arthritis.  Continue methotrexate.


7.  Hypoparathyroidism.  Continue Synthroid.


8.  Hyperlipidemia.  Continue statin.


9.  Gastritis by history.  


   - Protonix for peptic ulcer disease prophylaxis. 


10. Hypokalemia- resolved


10.Sequential compression device for deep venous thrombosis prophylaxis.  





Further recommendations based on clinical





Subjective


24 Hr Interval Summary


Constitutional:  improved


Eyes:  no complaints


ENT:  no complaints


Respiratory:  no complaints


Cardiovascular:  no complaints


Gastrointestinal:  pain


Genitourinary:  no complaints


Musculoskeletal:  no complaints


Skin:  no complaints


Neurologic:  no complaints


Endocrine:  no complaints


Lymphatic:  no complaints


Psychological:  no complaints


Immunologic:  no complaints





Exam/Review of Systems


Vital Signs


Vitals





 Vital Signs








  Date Time  Temp Pulse Resp B/P Pulse Ox O2 Delivery O2 Flow Rate FiO2


 


4/10/17 08:42 98.1 75 18 164/76 98   


 


4/8/17 14:01      Nasal Cannula 2.0 














 Intake and Output   


 


 4/9/17 4/9/17 4/10/17





 15:00 23:00 07:00


 


Intake Total 300 ml 1440 ml 1340 ml


 


Balance 300 ml 1440 ml 1340 ml











Exam


Constitutional:  alert, well developed


Psych:  nl mood/affect


Head:  atraumatic


Eyes:  EOMI


ENMT:  nl external ears & nose


Neck:  supple


Cardiovascular:  nl pulses


Gastrointestinal:  non-tender, soft


Musculoskeletal:  nl extremities to inspection


Extremities:  normal pulses


Neurological:  nl mental status, nl speech


Skin:  nl turgor


Lymph:  nontender





Results


Result Diagram:  


4/10/17 0910                                                                   

             4/10/17 0910





Results 24 hrs





Laboratory Tests








Test


  4/10/17


09:10 4/10/17


12:18 4/10/17


17:27


 


White Blood Count 10.0    


 


Red Blood Count 3.73  L  


 


Hemoglobin 9.9  L  


 


Hematocrit 31.1  L  


 


Mean Corpuscular Volume 83.4    


 


Mean Corpuscular Hemoglobin 26.5  L  


 


Mean Corpuscular Hemoglobin


Concent 31.8  L


  


  


 


 


Red Cell Distribution Width 16.5  H  


 


Platelet Count 472  #H  


 


Mean Platelet Volume 8.9    


 


Neutrophils % 76.1    


 


Lymphocytes % 12.6  L  


 


Monocytes % 7.8    


 


Eosinophils % 1.9    


 


Basophils % 0.4    


 


Nucleated Red Blood Cells % 0.0    


 


Neutrophils # 7.6  H  


 


Lymphocytes # 1.3    


 


Monocytes # 0.8    


 


Eosinophils # 0.2    


 


Basophils # 0.0    


 


Nucleated Red Blood Cells # 0.0    


 


Sodium Level 133  L  


 


Potassium Level 3.7    


 


Chloride Level 107    


 


Carbon Dioxide Level 23    


 


Anion Gap 7  L  


 


Blood Urea Nitrogen 6  L  


 


Creatinine 0.45    


 


Glucose Level 152    


 


Calcium Level 8.4    


 


Magnesium Level 2.2    


 


Total Bilirubin 0.2    


 


Direct Bilirubin 0.00    


 


Indirect Bilirubin 0.2    


 


Aspartate Amino Transf


(AST/SGOT) 29  


  


  


 


 


Alanine Aminotransferase


(ALT/SGPT) 90  H


  


  


 


 


Alkaline Phosphatase 238  H  


 


Total Protein 5.9  L  


 


Albumin 2.8  L  


 


Bedside Glucose  171   133  











Medications


Medications





 Current Medications


Acetaminophen (Tylenol Supp) 650 mg Q6H  PRN [place] fever;  Start 4/7/17 at 18:00


Morphine Sulfate (morphine) 1 mg Q4H  PRN IV PAIN Last administered on 4/9/17at 

22:39; Admin Dose 1 MG;  Start 4/7/17 at 18:00


Levothyroxine Sodium (Synthroid) 50 mcg DAILY@06 PO  Last administered on 4/10/

17at 05:29; Admin Dose 50 MCG;  Start 4/8/17 at 06:00


Valsartan (Diovan) 160 mg DAILY PO  Last administered on 4/10/17at 08:17; Admin 

Dose 160 MG;  Start 4/8/17 at 09:00


Miscellaneous Information 1 ea NOTE XX ;  Start 4/7/17 at 18:00


Glucose (Glutose) 15 gm Q15M  PRN PO DECREASED GLUCOSE;  Start 4/7/17 at 18:00


Glucose (Glutose) 22.5 gm Q15M  PRN PO DECREASED GLUCOSE;  Start 4/7/17 at 18:00


Dextrose (D50w Syringe) 25 ml Q15M  PRN IV DECREASED GLUCOSE;  Start 4/7/17 at 

18:00


Dextrose (D50w Syringe) 50 ml Q15M  PRN IV DECREASED GLUCOSE;  Start 4/7/17 at 

18:00


Glucagon (Glucagen) 1 mg Q15M  PRN IM DECREASED GLUCOSE;  Start 4/7/17 at 18:00


Glucose 15 gm 15 gm Q15M  PRN BUCCAL DECREASED GLUCOSE;  Start 4/7/17 at 18:00


Potassium Chloride/Dextrose/ Sod Cl (D5-NS + KCl 20 Meq) 1,000 ml @  70 mls/hr 

[id number] IV  Last administered on 4/10/17at 01:12; Admin Dose 70 MLS/HR;  Start 4 /7/17 at 18:30


Levothyroxine Sodium (Synthroid Iv) 25 mcg DAILY@06 IV  Last administered on 4/8 /17at 05:32; Admin Dose 25 MCG;  Start 4/8/17 at 06:00;  Status Future Hold


Hydralazine HCl (Apresoline) 10 mg Q6H  PRN IV SBP>170 Last administered on 4/10

/17at 11:44; Admin Dose 10 MG;  Start 4/7/17 at 18:30


Ondansetron HCl (Zofran Inj) 4 mg Q6H  PRN IV NAUSEA AND/OR VOMITING Last 

administered on 4/10/17at 18:10; Admin Dose 4 MG;  Start 4/7/17 at 18:30


Morphine Sulfate 2 mg 2 mg Q4H  PRN IV PAIN;  Start 4/7/17 at 18:30


Ceftriaxone Sodium 50 ml @  100 mls/hr Q24H IVPB  Last administered on 4/10/

17at 11:44; Admin Dose 100 MLS/HR;  Start 4/8/17 at 10:00


Metronidazole (Flagyl 500 Mg (Pmx)) 100 ml @  100 mls/hr Q8 IVPB  Last 

administered on 4/10/17at 15:18; Admin Dose 100 MLS/HR;  Start 4/7/17 at 22:00


Pantoprazole (Protonix Tab) 40 mg DAILY@06 PO  Last administered on 4/10/17at 05

:29; Admin Dose 40 MG;  Start 4/9/17 at 06:00


Al Hydrox/Mg Hydrox/Simethicone (Mag-Al Plus) 30 ml Q6H  PRN PO 

GASTROINTESTINAL UPSET Last administered on 4/9/17at 23:51; Admin Dose 30 ML;  

Start 4/8/17 at 21:00


Docusate Sodium (Colace) 100 mg BID PO  Last administered on 4/10/17at 08:17; 

Admin Dose 100 MG;  Start 4/9/17 at 21:00


Atorvastatin Calcium (Lipitor) 80 mg HS PO ;  Start 4/10/17 at 21:00


Diagnostic Test (Pha) (Accu-Chek) 1 ea 02 XX ;  Start 4/11/17 at 02:00


Cholecalciferol (Vitamin D) 4,000 unit DAILY PO  Last administered on 4/10/17at 

12:46; Admin Dose 4,000 UNIT;  Start 4/10/17 at 12:00











CRISPIN RAMIREZ Apr 10, 2017 20:38

## 2017-04-10 NOTE — PN
DATE:  04/10/2017

 

 

COMPLAINT:  At this time she has minimal right upper quadrant pain, no nausea, no vomiting, no fever
, no jaundice at this time.  No diarrhea, no GI bleeding.

 

PHYSICAL EXAMINATION: 

GENERAL:  The patient appears to be well, not in distress.

VITAL SIGNS:  Show pulse is 75, blood pressure is 164/76.

ABDOMEN:  Nontender, nondistended.

 

LABORATORY:  WBC is 10,000.

 

CLINICAL IMPRESSION:  The patient improving from the cholangitis.  The patient has a cholecystitis.

 

PLAN:  Cholecystectomy is as per surgeon.

 

 

Dictated By: LILLIAM CRENSHAW MD

 

NC/NTS

DD:    04/10/2017 10:26:03

DT:    04/10/2017 11:05:18

Conf#: [id number]

DID#:  [id number]

CC: LILLIAM CRENSHAW MD;*EndCC*

## 2017-04-11 VITALS — SYSTOLIC BLOOD PRESSURE: 148 MMHG | DIASTOLIC BLOOD PRESSURE: 70 MMHG

## 2017-04-11 VITALS — DIASTOLIC BLOOD PRESSURE: 77 MMHG | SYSTOLIC BLOOD PRESSURE: 174 MMHG | RESPIRATION RATE: 20 BRPM

## 2017-04-11 VITALS — RESPIRATION RATE: 18 BRPM | DIASTOLIC BLOOD PRESSURE: 74 MMHG | SYSTOLIC BLOOD PRESSURE: 164 MMHG

## 2017-04-11 LAB
ADD SCAN DIFF: NO
ANION GAP SERPL CALC-SCNC: 9 MMOL/L (ref 8–16)
BASOPHILS # BLD AUTO: 0.1 10^3/UL (ref 0–0.1)
BASOPHILS NFR BLD: 0.6 % (ref 0–2)
BUN SERPL-MCNC: 6 MG/DL (ref 7–20)
CALCIUM SERPL-MCNC: 8.6 MG/DL (ref 8.4–10.2)
CHLORIDE SERPL-SCNC: 106 MMOL/L (ref 97–110)
CO2 SERPL-SCNC: 23 MMOL/L (ref 21–31)
CREAT SERPL-MCNC: 0.47 MG/DL (ref 0.44–1)
EOSINOPHIL # BLD: 0.2 10^3/UL (ref 0–0.5)
EOSINOPHIL NFR BLD: 2.1 % (ref 0–7)
ERYTHROCYTE [DISTWIDTH] IN BLOOD BY AUTOMATED COUNT: 17 % (ref 11.5–14.5)
GLUCOSE SERPL-MCNC: 141 MG/DL (ref 70–220)
HCT VFR BLD CALC: 31.5 % (ref 37–47)
HGB BLD-MCNC: 10.1 G/DL (ref 12–16)
LYMPHOCYTES # BLD AUTO: 1.6 10^3/UL (ref 0.8–2.9)
LYMPHOCYTES NFR BLD AUTO: 15.4 % (ref 15–51)
MCH RBC QN AUTO: 26.9 PG (ref 29–33)
MCHC RBC AUTO-ENTMCNC: 32.1 G/DL (ref 32–37)
MCV RBC AUTO: 83.8 FL (ref 82–101)
MONOCYTES # BLD: 0.9 10^3/UL (ref 0.3–0.9)
MONOCYTES NFR BLD: 8.9 % (ref 0–11)
NEUTROPHILS # BLD: 7.3 10^3/UL (ref 1.6–7.5)
NEUTROPHILS NFR BLD AUTO: 70.2 % (ref 39–77)
NRBC # BLD MANUAL: 0 10^3/UL (ref 0–0)
NRBC BLD QL: 0 /100WBC (ref 0–0)
PLATELET # BLD: 539 10^3/UL (ref 140–415)
PMV BLD AUTO: 9.1 FL (ref 7.4–10.4)
POTASSIUM SERPL-SCNC: 3.8 MMOL/L (ref 3.5–5.1)
RBC # BLD AUTO: 3.76 10^6/UL (ref 4.2–5.4)
SODIUM SERPL-SCNC: 134 MMOL/L (ref 135–144)
WBC # BLD AUTO: 10.3 10^3/UL (ref 4.8–10.8)

## 2017-04-11 RX ADMIN — DOCUSATE SODIUM SCH MG: 100 CAPSULE, LIQUID FILLED ORAL at 08:00

## 2017-04-11 RX ADMIN — ACARBOSE SCH MG: 50 TABLET ORAL at 17:50

## 2017-04-11 RX ADMIN — LEVOTHYROXINE SODIUM SCH MCG: 50 TABLET ORAL at 06:07

## 2017-04-11 RX ADMIN — DOCUSATE SODIUM SCH MG: 100 CAPSULE, LIQUID FILLED ORAL at 21:33

## 2017-04-11 RX ADMIN — ACARBOSE SCH MG: 50 TABLET ORAL at 08:01

## 2017-04-11 RX ADMIN — Medication SCH MLS/HR: at 21:36

## 2017-04-11 RX ADMIN — PANTOPRAZOLE SODIUM SCH MG: 40 TABLET, DELAYED RELEASE ORAL at 06:07

## 2017-04-11 RX ADMIN — Medication SCH MLS/HR: at 06:07

## 2017-04-11 RX ADMIN — Medication SCH MLS/HR: at 13:27

## 2017-04-11 RX ADMIN — ASCORBIC ACID, VITAMIN A PALMITATE, CHOLECALCIFEROL, THIAMINE HYDROCHLORIDE, RIBOFLAVIN-5 PHOSPHATE SODIUM, PYRIDOXINE HYDROCHLORIDE, NIACINAMIDE, DEXPANTHENOL, ALPHA-TOCOPHEROL ACETATE, VITAMIN K1, FOLIC ACID, BIOTIN, CYANOCOBALAMIN SCH MLS/HR: 200; 3300; 200; 6; 3.6; 6; 40; 15; 10; 150; 600; 60; 5 INJECTION, SOLUTION INTRAVENOUS at 17:50

## 2017-04-11 RX ADMIN — ATORVASTATIN CALCIUM SCH MG: 80 TABLET, FILM COATED ORAL at 21:00

## 2017-04-11 RX ADMIN — DEXAMETHASONE SODIUM PHOSPHATE PRN MG: 10 INJECTION, SOLUTION INTRAMUSCULAR; INTRAVENOUS at 08:00

## 2017-04-11 RX ADMIN — ACARBOSE SCH MG: 50 TABLET ORAL at 12:28

## 2017-04-11 RX ADMIN — CEFTRIAXONE SCH MLS/HR: 1 INJECTION, SOLUTION INTRAVENOUS at 10:37

## 2017-04-11 RX ADMIN — VALSARTAN SCH MG: 160 TABLET, FILM COATED ORAL at 08:01

## 2017-04-11 NOTE — RADRPT
Vent Rate: 71 bpm

RR Interval: 0 msec

SD Interval: 150 msec

QRS Duration: 90 msec

QT Interval: 398 msec

QTC Interval: 432 msec

P-R-T Axis: 50 - 7 - 31 degrees

 

 Normal sinus rhythm

Inverted T in V3

Abnormal ECG

 

Electronically Signed By: Mike Dueñas

[id number]

## 2017-04-11 NOTE — PN
DATE:  

 

 

SUBJECTIVE:  The patient feels better, less abdominal pain, vomited this morning, but she was able t
o keep the lunch.  She has been eating food at this time, no history of GI bleeding, no fever.

 

PHYSICAL EXAMINATION:

VITAL SIGNS:  Temperature 98.3.  Pulse is 77, blood pressure 164/74.

CARDIOVASCULAR:  Normal heart sounds.

RESPIRATORY:  Normal breath sounds.

ABDOMEN:  Shows soft abdomen.

 

LABORATORY WORKUP:  WBC count 10,300, hemoglobin 10.1, potassium 3.8.  The AST is 29, ALT 90.  Alkal
ine phosphatase is 238.

 

CLINICAL IMPRESSION:  The patient presenting with history of _____ status post ERCP, stent placement
.  White count improved to 10,300.  Liver function shows some cholangitis.  The patient has acute ch
olecystitis as well.

 

PLAN:  At this time, the patient's family is reluctant to have any surgery for gallbladder removal. 
 We will continue present management with antibiotics.

 

 

Dictated By: LILLIAM ANTONIO/GRAHAM

DD:    04/11/2017 19:02:42

DT:    04/11/2017 19:25:28

Conf#: [id number]

DID#:  [id number]

## 2017-04-11 NOTE — PN
Date/Time of Note


Date/Time of Note


DATE: 4/11/17 


TIME: 19:16





Assessment/Plan


Lines/Catheters


IV Catheter Type (from [facility]):  Peripheral IV


Riojas in Place (from [facility]):  No





Assessment/Plan


Chief Complaint/Hosp Course


1.  Abdominal pain, & leukocytosis 2nd Cholangitis and ? Cholecystitis (

probably acalculous) s/p ERCP 4/8.  Improved


-antibiotics


-supportive care


-dc planning ok from surgical standpoint > f/u as outpt for possible elective 

cholecystectomy


-she had to f/u with dr lopez for stent removal


2.  Sepsis 2nd above.  Resolved.


-as above


3. Electrolyte abnormalities with hypokalemia.  Please correct with fluid and 

nutritional management.


4.  Hypertension.  Continue nutrition and medication optimization.


5.  Prediabetes.  Continue nutrition and medication optimization.


6.  Rheumatoid arthritis, on methotrexate and steroids shots.  Continue medical 

optimization.


7.  Hypothyroidism.  Continue Synthroid.


8.  Hyperlipidemia.  Continue diet and medication optimization.


9.  History of gastritis.  Continue proton pump inhibitor and encourage 

nutritional and lifestyle optimization.


10.  Anemia without evidence of acute blood loss, continue monitoring.


 


Thank you


Problems:  





Subjective


24 Hr Interval Summary


Pain resolved.  Feels great.  No f/c.  No cough.  No sz.  No rash.  No n/v.  No 

cp/sob.  Bowel function.





Exam/Review of Systems


Vital Signs


Vitals





 Vital Signs








  Date Time  Temp Pulse Resp B/P Pulse Ox O2 Delivery O2 Flow Rate FiO2


 


4/11/17 08:23 98.3 77 18 164/74 96   


 


4/8/17 14:01      Nasal Cannula 2.0 














 Intake and Output   


 


 4/10/17 4/10/17 4/11/17





 15:00 23:00 07:00


 


Intake Total 50 ml 1210 ml 740 ml


 


Balance 50 ml 1210 ml 740 ml











Exam


Free Text/Dictation


GENERAL:  No acute distress, however, uncomfortable.


HEENT:  Pupils equal, reactive.  No scleral icterus.  Mucous membranes are 

moist.


NECK:  Supple, no JVD, no lymphadenopathy.  


PULMONARY:  Normal respiratory effort. No wheezing.


HEART:  S1, S2 present and regular.


ABDOMEN:  Soft, NT.  No rebound, no guarding, not rigid


EXTREMITIES:  No edema.


VASCULAR:  Cap refill less than 2 seconds.


NEUROLOGIC:  Alert, oriented, moves all 4 extremities grossly.





Results


Result Diagram:  


4/11/17 1043                                                                   

             4/11/17 1043














LIANET GROVER MD Apr 11, 2017 19:17














LIANET GROVER MD Apr 11, 2017 19:17

## 2017-04-11 NOTE — PN
Date/Time of Note


Date/Time of Note


DATE: 4/11/17 


TIME: 21:32





Assessment/Plan


VTE Prophylaxis


VTE Prophylaxis Intervention:  SCD's





Lines/Catheters


IV Catheter Type (from [facility]):  Peripheral IV


Urinary Cath still in place:  No





Assessment/Plan


Assessment/Plan


1.  Possible acute cholecystitis.  


   - per GI


   -sp ERCP/Stent placement


2.  Systemic inflammatory response syndrome with leukocytosis secondary to 

acute cholecystitis.  Continue antibiotics.  Will obtain blood cultures to rule 

out bacteremia.


3.  Rule out choledocholithiasis.  The patient will undergo MRCP.  Dr. Knapp 

was asked to see patient in gastroenterology consultation.  We will ask Dr. Edwards to see patient in general surgery consultation.


4.  Hypertension.  We will continue the patient on Diovan


5.  Hydralazine p.r.n. for systolic blood pressure above 170.


6.  Rheumatoid arthritis.  Continue methotrexate.


7.  Hypoparathyroidism.  Continue Synthroid.


8.  Hyperlipidemia.  Continue statin.


9.  Gastritis by history.  


   - Protonix for peptic ulcer disease prophylaxis. 


10. Hypokalemia- resolved


10.Sequential compression device for deep venous thrombosis prophylaxis.  





Further recommendations based on clinical





Subjective


24 Hr Interval Summary


Free Text/Dictation


nad, up in bed, family at bed side. dw staff.


Constitutional:  improved


Eyes:  no complaints


ENT:  no complaints


Respiratory:  no complaints


Cardiovascular:  no complaints


Gastrointestinal:  no complaints, other (feels better)


Genitourinary:  no complaints


Musculoskeletal:  no complaints


Skin:  no complaints


Neurologic:  no complaints


Endocrine:  no complaints


Lymphatic:  no complaints


Psychological:  no complaints


Immunologic:  no complaints





Exam/Review of Systems


Vital Signs


Vitals





 Vital Signs








  Date Time  Temp Pulse Resp B/P Pulse Ox O2 Delivery O2 Flow Rate FiO2


 


4/11/17 21:29 98.0 76 20 174/77 98   


 


4/8/17 14:01      Nasal Cannula 2.0 














 Intake and Output   


 


 4/10/17 4/10/17 4/11/17





 15:00 23:00 07:00


 


Intake Total 50 ml 1210 ml 740 ml


 


Balance 50 ml 1210 ml 740 ml











Exam


Constitutional:  alert, oriented, well developed


Psych:  nl mood/affect


Head:  atraumatic


Eyes:  EOMI, PERRL, nl sclera


ENMT:  nl external ears & nose


Neck:  non-tender


Respiratory:  normal air movement


Cardiovascular:  nl pulses


Gastrointestinal:  non-tender, soft


Musculoskeletal:  nl extremities to inspection


Neurological:  nl mental status, nl speech


Skin:  nl turgor


Lymph:  nontender





Results


Result Diagram:  


4/11/17 1043                                                                   

             4/11/17 1043





Results 24 hrs





Laboratory Tests








Test


  4/11/17


07:53 4/11/17


10:43 4/11/17


11:58 4/11/17


16:26


 


Bedside Glucose 128    147   145  


 


White Blood Count  10.3    


 


Red Blood Count  3.76  L  


 


Hemoglobin  10.1  L  


 


Hematocrit  31.5  L  


 


Mean Corpuscular Volume  83.8    


 


Mean Corpuscular Hemoglobin  26.9  L  


 


Mean Corpuscular Hemoglobin


Concent 


  32.1  


  


  


 


 


Red Cell Distribution Width  17.0  H  


 


Platelet Count  539  H  


 


Mean Platelet Volume  9.1    


 


Neutrophils %  70.2    


 


Lymphocytes %  15.4    


 


Monocytes %  8.9    


 


Eosinophils %  2.1    


 


Basophils %  0.6    


 


Nucleated Red Blood Cells %  0.0    


 


Neutrophils #  7.3    


 


Lymphocytes #  1.6    


 


Monocytes #  0.9    


 


Eosinophils #  0.2    


 


Basophils #  0.1    


 


Nucleated Red Blood Cells #  0.0    


 


Sodium Level  134  L  


 


Potassium Level  3.8    


 


Chloride Level  106    


 


Carbon Dioxide Level  23    


 


Anion Gap  9    


 


Blood Urea Nitrogen  6  L  


 


Creatinine  0.47    


 


Glucose Level  141    


 


Calcium Level  8.6    











Medications


Medications





 Current Medications


Acetaminophen (Tylenol Supp) 650 mg Q6H  PRN [place] fever;  Start 4/7/17 at 18:00


Morphine Sulfate (morphine) 1 mg Q4H  PRN IV PAIN Last administered on 4/9/17at 

22:39; Admin Dose 1 MG;  Start 4/7/17 at 18:00


Levothyroxine Sodium (Synthroid) 50 mcg DAILY@06 PO  Last administered on 4/11/ 17at 06:07; Admin Dose 50 MCG;  Start 4/8/17 at 06:00


Valsartan (Diovan) 160 mg DAILY PO  Last administered on 4/11/17at 08:01; Admin 

Dose 160 MG;  Start 4/8/17 at 09:00


Miscellaneous Information 1 ea NOTE XX ;  Start 4/7/17 at 18:00


Glucose (Glutose) 15 gm Q15M  PRN PO DECREASED GLUCOSE;  Start 4/7/17 at 18:00


Glucose (Glutose) 22.5 gm Q15M  PRN PO DECREASED GLUCOSE;  Start 4/7/17 at 18:00


Dextrose (D50w Syringe) 25 ml Q15M  PRN IV DECREASED GLUCOSE;  Start 4/7/17 at 

18:00


Dextrose (D50w Syringe) 50 ml Q15M  PRN IV DECREASED GLUCOSE;  Start 4/7/17 at 

18:00


Glucagon (Glucagen) 1 mg Q15M  PRN IM DECREASED GLUCOSE;  Start 4/7/17 at 18:00


Glucose 15 gm 15 gm Q15M  PRN BUCCAL DECREASED GLUCOSE;  Start 4/7/17 at 18:00


Potassium Chloride/Dextrose/ Sod Cl (D5-NS + KCl 20 Meq) 1,000 ml @  70 mls/hr 

[id number] IV  Last administered on 4/11/17at 17:50; Admin Dose 70 MLS/HR;  Start 4 /7/17 at 18:30


Levothyroxine Sodium (Synthroid Iv) 25 mcg DAILY@06 IV  Last administered on 4/8 /17at 05:32; Admin Dose 25 MCG;  Start 4/8/17 at 06:00;  Status Future Hold


Hydralazine HCl (Apresoline) 10 mg Q6H  PRN IV SBP>170 Last administered on 4/10

/17at 20:56; Admin Dose 10 MG;  Start 4/7/17 at 18:30


Ondansetron HCl (Zofran Inj) 4 mg Q6H  PRN IV NAUSEA AND/OR VOMITING Last 

administered on 4/11/17at 08:00; Admin Dose 4 MG;  Start 4/7/17 at 18:30


Morphine Sulfate 2 mg 2 mg Q4H  PRN IV PAIN;  Start 4/7/17 at 18:30


Ceftriaxone Sodium 50 ml @  100 mls/hr Q24H IVPB  Last administered on 4/11/ 17at 10:37; Admin Dose 100 MLS/HR;  Start 4/8/17 at 10:00


Metronidazole (Flagyl 500 Mg (Pmx)) 100 ml @  100 mls/hr Q8 IVPB  Last 

administered on 4/11/17at 13:27; Admin Dose 100 MLS/HR;  Start 4/7/17 at 22:00


Pantoprazole (Protonix Tab) 40 mg DAILY@06 PO  Last administered on 4/11/17at 06

:07; Admin Dose 40 MG;  Start 4/9/17 at 06:00


Al Hydrox/Mg Hydrox/Simethicone (Mag-Al Plus) 30 ml Q6H  PRN PO 

GASTROINTESTINAL UPSET Last administered on 4/9/17at 23:51; Admin Dose 30 ML;  

Start 4/8/17 at 21:00


Docusate Sodium (Colace) 100 mg BID PO  Last administered on 4/11/17at 08:00; 

Admin Dose 100 MG;  Start 4/9/17 at 21:00


Atorvastatin Calcium (Lipitor) 80 mg HS PO  Last administered on 4/10/17at 20:42

; Admin Dose 80 MG;  Start 4/10/17 at 21:00


Diagnostic Test (Pha) (Accu-Chek) 1 ea 02 XX ;  Start 4/11/17 at 02:00


Cholecalciferol (Vitamin D) 4,000 unit DAILY PO  Last administered on 4/11/17at 

08:00; Admin Dose 4,000 UNIT;  Start 4/10/17 at 12:00











CRISPIN RAMIREZ Apr 11, 2017 21:33

## 2017-04-12 VITALS — RESPIRATION RATE: 20 BRPM | DIASTOLIC BLOOD PRESSURE: 68 MMHG | SYSTOLIC BLOOD PRESSURE: 152 MMHG

## 2017-04-12 LAB
ADD SCAN DIFF: NO
ANION GAP SERPL CALC-SCNC: 9 MMOL/L (ref 8–16)
BASOPHILS # BLD AUTO: 0.1 10^3/UL (ref 0–0.1)
BASOPHILS NFR BLD: 0.7 % (ref 0–2)
BUN SERPL-MCNC: 5 MG/DL (ref 7–20)
CALCIUM SERPL-MCNC: 8.9 MG/DL (ref 8.4–10.2)
CHLORIDE SERPL-SCNC: 105 MMOL/L (ref 97–110)
CO2 SERPL-SCNC: 24 MMOL/L (ref 21–31)
CREAT SERPL-MCNC: 0.45 MG/DL (ref 0.44–1)
EOSINOPHIL # BLD: 0.3 10^3/UL (ref 0–0.5)
EOSINOPHIL NFR BLD: 2.6 % (ref 0–7)
ERYTHROCYTE [DISTWIDTH] IN BLOOD BY AUTOMATED COUNT: 17.4 % (ref 11.5–14.5)
GLUCOSE SERPL-MCNC: 133 MG/DL (ref 70–220)
HCT VFR BLD CALC: 34.4 % (ref 37–47)
HGB BLD-MCNC: 10.6 G/DL (ref 12–16)
LYMPHOCYTES # BLD AUTO: 2.2 10^3/UL (ref 0.8–2.9)
LYMPHOCYTES NFR BLD AUTO: 18.8 % (ref 15–51)
MCH RBC QN AUTO: 25.8 PG (ref 29–33)
MCHC RBC AUTO-ENTMCNC: 30.8 G/DL (ref 32–37)
MCV RBC AUTO: 83.7 FL (ref 82–101)
MONOCYTES # BLD: 1 10^3/UL (ref 0.3–0.9)
MONOCYTES NFR BLD: 8.4 % (ref 0–11)
NEUTROPHILS # BLD: 7.6 10^3/UL (ref 1.6–7.5)
NEUTROPHILS NFR BLD AUTO: 66.7 % (ref 39–77)
NRBC # BLD MANUAL: 0 10^3/UL (ref 0–0)
NRBC BLD QL: 0 /100WBC (ref 0–0)
PLATELET # BLD: 561 10^3/UL (ref 140–415)
PMV BLD AUTO: 9 FL (ref 7.4–10.4)
POTASSIUM SERPL-SCNC: 3.7 MMOL/L (ref 3.5–5.1)
RBC # BLD AUTO: 4.11 10^6/UL (ref 4.2–5.4)
SODIUM SERPL-SCNC: 134 MMOL/L (ref 135–144)
WBC # BLD AUTO: 11.4 10^3/UL (ref 4.8–10.8)

## 2017-04-12 RX ADMIN — LEVOTHYROXINE SODIUM SCH MCG: 50 TABLET ORAL at 06:01

## 2017-04-12 RX ADMIN — VALSARTAN SCH MG: 160 TABLET, FILM COATED ORAL at 08:07

## 2017-04-12 RX ADMIN — PANTOPRAZOLE SODIUM SCH MG: 40 TABLET, DELAYED RELEASE ORAL at 06:01

## 2017-04-12 RX ADMIN — CEFTRIAXONE SCH MLS/HR: 1 INJECTION, SOLUTION INTRAVENOUS at 10:28

## 2017-04-12 RX ADMIN — Medication SCH MLS/HR: at 06:00

## 2017-04-12 RX ADMIN — DOCUSATE SODIUM SCH MG: 100 CAPSULE, LIQUID FILLED ORAL at 08:08

## 2017-04-12 RX ADMIN — ACARBOSE SCH MG: 50 TABLET ORAL at 12:34

## 2017-04-12 RX ADMIN — ACARBOSE SCH MG: 50 TABLET ORAL at 08:07

## 2017-04-12 RX ADMIN — ASCORBIC ACID, VITAMIN A PALMITATE, CHOLECALCIFEROL, THIAMINE HYDROCHLORIDE, RIBOFLAVIN-5 PHOSPHATE SODIUM, PYRIDOXINE HYDROCHLORIDE, NIACINAMIDE, DEXPANTHENOL, ALPHA-TOCOPHEROL ACETATE, VITAMIN K1, FOLIC ACID, BIOTIN, CYANOCOBALAMIN SCH MLS/HR: 200; 3300; 200; 6; 3.6; 6; 40; 15; 10; 150; 600; 60; 5 INJECTION, SOLUTION INTRAVENOUS at 10:28

## 2017-04-12 NOTE — PN
Date/Time of Note


Date/Time of Note


DATE: 4/12/17 


TIME: 13:38





Assessment/Plan


Lines/Catheters


IV Catheter Type (from [facility] [place]):  Peripheral IV


Riojas in Place (from [facility] [place]):  No





Assessment/Plan


Chief Complaint/Hosp Course


1.  Abdominal pain, & leukocytosis 2nd Cholangitis and ? Cholecystitis (

probably acalculous) s/p ERCP 4/8.  Improved


-antibiotics


-supportive care


-dc planning ok from surgical standpoint > f/u as outpt for possible elective 

cholecystectomy


-she had to f/u with dr lopez for stent removal


2.  Sepsis 2nd above.  Resolved.


-as above


3. Electrolyte abnormalities with hypokalemia.  Please correct with fluid and 

nutritional management.


4.  Hypertension.  Continue nutrition and medication optimization.


5.  Prediabetes.  Continue nutrition and medication optimization.


6.  Rheumatoid arthritis, on methotrexate and steroids shots.  Continue medical 

optimization.


7.  Hypothyroidism.  Continue Synthroid.


8.  Hyperlipidemia.  Continue diet and medication optimization.


9.  History of gastritis.  Continue proton pump inhibitor and encourage 

nutritional and lifestyle optimization.


10.  Anemia without evidence of acute blood loss, continue monitoring.


 


Thank you


Problems:  





Subjective


24 Hr Interval Summary


Pain resolved.  Feels great.  No f/c.  No cough.  No sz.  No rash.  No n/v.  No 

cp/sob.  Bowel function.





Exam/Review of Systems


Vital Signs


Vitals





 Vital Signs








  Date Time  Temp Pulse Resp B/P Pulse Ox O2 Delivery O2 Flow Rate FiO2


 


4/12/17 07:35 98.2 76 20 152/68 96   


 


4/8/17 14:01      Nasal Cannula 2.0 














 Intake and Output   


 


 4/11/17 4/11/17 4/12/17





 14:59 22:59 06:59


 


Intake Total 50 ml 1265 ml 955 ml


 


Balance 50 ml 1265 ml 955 ml











Exam


Free Text/Dictation


GENERAL:  No acute distress, however, uncomfortable.


HEENT:  Pupils equal, reactive.  No scleral icterus.  Mucous membranes are 

moist.


NECK:  Supple, no JVD, no lymphadenopathy.  


PULMONARY:  Normal respiratory effort. No wheezing.


HEART:  S1, S2 present and regular.


ABDOMEN:  Soft, NT.  No rebound, no guarding, not rigid


EXTREMITIES:  No edema.


VASCULAR:  Cap refill less than 2 seconds.


NEUROLOGIC:  Alert, oriented, moves all 4 extremities grossly.





Results


Result Diagram:  


4/12/17 0940                                                                   

             4/12/17 0940














LIANET GROVER MD Apr 12, 2017 13:38

## 2017-04-18 ENCOUNTER — HOSPITAL ENCOUNTER (INPATIENT)
Dept: HOSPITAL 10 - E/R | Age: 82
LOS: 5 days | Discharge: HOME | DRG: 392 | End: 2017-04-23
Attending: INTERNAL MEDICINE | Admitting: INTERNAL MEDICINE
Payer: MEDICARE

## 2017-04-18 VITALS
HEIGHT: 63 IN | BODY MASS INDEX: 23.05 KG/M2 | WEIGHT: 130.07 LBS | BODY MASS INDEX: 23.05 KG/M2 | HEIGHT: 63 IN | WEIGHT: 130.07 LBS

## 2017-04-18 VITALS — TEMPERATURE: 98.7 F

## 2017-04-18 DIAGNOSIS — E03.9: ICD-10-CM

## 2017-04-18 DIAGNOSIS — M06.9: ICD-10-CM

## 2017-04-18 DIAGNOSIS — E78.5: ICD-10-CM

## 2017-04-18 DIAGNOSIS — D64.9: ICD-10-CM

## 2017-04-18 DIAGNOSIS — K57.32: Primary | ICD-10-CM

## 2017-04-18 DIAGNOSIS — I10: ICD-10-CM

## 2017-04-18 DIAGNOSIS — Z87.19: ICD-10-CM

## 2017-04-18 DIAGNOSIS — K44.9: ICD-10-CM

## 2017-04-18 DIAGNOSIS — Z79.52: ICD-10-CM

## 2017-04-18 DIAGNOSIS — E87.6: ICD-10-CM

## 2017-04-18 DIAGNOSIS — R73.03: ICD-10-CM

## 2017-04-18 DIAGNOSIS — Z96.89: ICD-10-CM

## 2017-04-18 DIAGNOSIS — Z79.899: ICD-10-CM

## 2017-04-18 LAB
ADD SCAN DIFF: NO
ADD UMIC: YES
ALBUMIN SERPL-MCNC: 3.4 G/DL (ref 3.3–4.9)
ALBUMIN/GLOB SERPL: 0.97 {RATIO}
ALP SERPL-CCNC: 173 IU/L (ref 42–121)
ALT SERPL-CCNC: 58 IU/L (ref 13–69)
ANION GAP SERPL CALC-SCNC: 15 MMOL/L (ref 8–16)
APTT BLD: 35.2 SEC (ref 25–35)
AST SERPL-CCNC: 52 IU/L (ref 15–46)
BILIRUB DIRECT SERPL-MCNC: 0 MG/DL (ref 0–0.2)
BILIRUB SERPL-MCNC: 0.2 MG/DL (ref 0.2–1.3)
BUN SERPL-MCNC: 8 MG/DL (ref 7–20)
CALCIUM SERPL-MCNC: 9.3 MG/DL (ref 8.4–10.2)
CHLORIDE SERPL-SCNC: 94 MMOL/L (ref 97–110)
CO2 SERPL-SCNC: 27 MMOL/L (ref 21–31)
COLOR UR: (no result)
CREAT SERPL-MCNC: 0.58 MG/DL (ref 0.44–1)
EOSINOPHIL # BLD: 0.2 10^3/UL (ref 0–0.5)
EOSINOPHIL NFR BLD: 1 % (ref 0–7)
ERYTHROCYTE [DISTWIDTH] IN BLOOD BY AUTOMATED COUNT: 16.9 % (ref 11.5–14.5)
GLOBULIN SER-MCNC: 3.5 G/DL (ref 1.3–3.2)
GLUCOSE SERPL-MCNC: 112 MG/DL (ref 70–220)
GLUCOSE UR STRIP-MCNC: NEGATIVE %
HCT VFR BLD CALC: 33.8 % (ref 37–47)
HGB BLD-MCNC: 10.7 G/DL (ref 12–16)
INR PPP: 1.09
KETONES UR STRIP.AUTO-MCNC: NEGATIVE MG/DL
LYMPHOCYTES # BLD AUTO: 0.7 10^3/UL (ref 0.8–2.9)
LYMPHOCYTES NFR BLD AUTO: 3 % (ref 15–51)
MCH RBC QN AUTO: 26.2 PG (ref 29–33)
MCHC RBC AUTO-ENTMCNC: 31.7 G/DL (ref 32–37)
MCV RBC AUTO: 82.6 FL (ref 82–101)
MONOCYTES # BLD: 0.2 10^3/UL (ref 0.3–0.9)
MONOCYTES NFR BLD: 1 % (ref 0–11)
NEUTROPHILS # BLD: 22.2 10^3/UL (ref 1.6–7.5)
NEUTROPHILS NFR BLD AUTO: 95 % (ref 39–77)
NITRITE UR QL STRIP.AUTO: NEGATIVE
PLATELET # BLD EST: (no result) 10*3/UL
PLATELET # BLD: 628 10^3/UL (ref 140–415)
PMV BLD AUTO: 9.4 FL (ref 7.4–10.4)
POTASSIUM SERPL-SCNC: 2.5 MMOL/L (ref 3.5–5.1)
PROT SERPL-MCNC: 6.9 G/DL (ref 6.1–8.1)
PROTHROMBIN TIME: 14.1 SEC (ref 12.2–14.2)
PT RATIO: 1.1
RBC # BLD AUTO: 4.09 10^6/UL (ref 4.2–5.4)
RBC # UR AUTO: (no result) /UL
RBC #/AREA URNS HPF: (no result) /HPF
SODIUM SERPL-SCNC: 133 MMOL/L (ref 135–144)
SQUAMOUS #/AREA URNS HPF: (no result) /[HPF]
TOTAL CELLS COUNTED PERCENT: 100 %
TROPONIN I SERPL-MCNC: < 0.012 NG/ML (ref 0–0.12)
URINE BILIRUBIN (DIP): NEGATIVE
URINE TOTAL PROTEIN (DIP): NEGATIVE
UROBILINOGEN UR STRIP-ACNC: (no result) (ref 0.1–1)
WBC # BLD AUTO: 23.4 10^3/UL (ref 4.8–10.8)
WBC # UR STRIP: NEGATIVE /UL

## 2017-04-18 PROCEDURE — 80048 BASIC METABOLIC PNL TOTAL CA: CPT

## 2017-04-18 PROCEDURE — 82962 GLUCOSE BLOOD TEST: CPT

## 2017-04-18 PROCEDURE — 83605 ASSAY OF LACTIC ACID: CPT

## 2017-04-18 PROCEDURE — 71010: CPT

## 2017-04-18 PROCEDURE — 36415 COLL VENOUS BLD VENIPUNCTURE: CPT

## 2017-04-18 PROCEDURE — 96366 THER/PROPH/DIAG IV INF ADDON: CPT

## 2017-04-18 PROCEDURE — 93005 ELECTROCARDIOGRAM TRACING: CPT

## 2017-04-18 PROCEDURE — 83735 ASSAY OF MAGNESIUM: CPT

## 2017-04-18 PROCEDURE — 84484 ASSAY OF TROPONIN QUANT: CPT

## 2017-04-18 PROCEDURE — 81003 URINALYSIS AUTO W/O SCOPE: CPT

## 2017-04-18 PROCEDURE — 85025 COMPLETE CBC W/AUTO DIFF WBC: CPT

## 2017-04-18 PROCEDURE — 87040 BLOOD CULTURE FOR BACTERIA: CPT

## 2017-04-18 PROCEDURE — 87045 FECES CULTURE AEROBIC BACT: CPT

## 2017-04-18 PROCEDURE — 96367 TX/PROPH/DG ADDL SEQ IV INF: CPT

## 2017-04-18 PROCEDURE — 85610 PROTHROMBIN TIME: CPT

## 2017-04-18 PROCEDURE — 87086 URINE CULTURE/COLONY COUNT: CPT

## 2017-04-18 PROCEDURE — 87081 CULTURE SCREEN ONLY: CPT

## 2017-04-18 PROCEDURE — 81001 URINALYSIS AUTO W/SCOPE: CPT

## 2017-04-18 PROCEDURE — 74177 CT ABD & PELVIS W/CONTRAST: CPT

## 2017-04-18 PROCEDURE — 80053 COMPREHEN METABOLIC PANEL: CPT

## 2017-04-18 PROCEDURE — 96368 THER/DIAG CONCURRENT INF: CPT

## 2017-04-18 PROCEDURE — 85730 THROMBOPLASTIN TIME PARTIAL: CPT

## 2017-04-18 PROCEDURE — 96365 THER/PROPH/DIAG IV INF INIT: CPT

## 2017-04-18 NOTE — RADRPT
PROCEDURE:   XR Chest.

 

CLINICAL INDICATION:   Sepsis

 

TECHNIQUE:   Single frontal chest x-ray. 

 

COMPARISON:   None.

 

FINDINGS:

The lungs are clear of acute infiltrates, edema, effusions, or masses..  The cardiomediastinal silho
uette is unremarkable. The osseous structures are intact.  There is mild scoliosis of the thoracic s
pine. 

 

IMPRESSION:

No acute cardiopulmonary disease.   

 

RPTAT: JJ

_____________________________________________ 

.Han Maloney MD, MD           Date    Time 

Electronically viewed and signed by .Han Maloney MD, MD on 04/18/2017 15:42 

 

D:  04/18/2017 15:42  T:  04/18/2017 15:42

.L/

## 2017-04-18 NOTE — ERA
ER Documentation


Chief Complaint


Date/Time


DATE: 17 


TIME: 14:48


Chief Complaint


ABNORMAL LAB WWORK.EELEVATED WBC.SENT BY PMD





POORNIMA


The patient is a 81-year-old female, presenting to the ER because of abnormal 

WBC of 36,000 from the blood test drawn yesterday by her physician.  She was 

sent to the ER for further evaluation.  She was recently treated for suspected 

cholangitis and cholecystitis with IV antibiotic.  She is currently taking 

Levaquin, complains of constipation.  She denies fever, chills, neck pain, 

chest pain, dyspnea.  She complains of diffuse abdominal pain, no nausea no 

vomiting.  She does not smoke or drink





Past medical history: Hypertension, dyslipidemia, rheumatoid arthritis, 

hypothyroidism


Past surgical history: ERCP recently by Dr. Ra BRIZUELA


All systems reviewed and are negative except as per history of present illness.





Medications


Home Meds


Active Scripts


Levofloxacin* (Levaquin*) 500 Mg Tablet, 500 MG PO DAILY for 7 Days, TAB


   Prov:CRISPIN RAMIREZ         17


Docusate Sodium (Dok) 100 Mg Capsule, 100 MG PO BID for 30 Days, CAP


   Prov:CRISPIN RAMIREZ         17


Reported Medications


Cholecalciferol* (Vitamin D3*) 1,000 Unit Tablet, 5000 UNIT PO DAILY, TAB


   17


Levothyroxine Sodium* (Levoxyl*) 50 Mcg Tablet, 50 MCG PO BEFORE BREAKFAST, #30 

TAB


   17


Acarbose* (Precose*) 50 Mg Tablet, 50 MG PO WITH BREAKFAST, TAB


   17


Valsartan* (Diovan*) 160 Mg Tablet, 160 MG PO DAILY, TAB


   16


Esomeprazole Mag Trihydrate (Nexium) 40 Mg Capsule.dr, 40 MG PO DAILY


   10/20/12


Discontinued Reported Medications


Methotrexate* (Methotrexate*) 2.5 Mg Tab, 7.5 MG PO BID, TAB


   17


Atorvastatin (Lipitor) 80 Mg Tablet, 80 MG PO DAILY


   10/20/12


Discontinued Scripts


Hydrocodone/Acetaminophen (Norco 5-325 Tablet) 1 Each Tablet, 1 EACH PO Q6 Y 

for PAIN LEVEL 7-10, #20 TAB


   Prov:CRISPIN RAMIREZ         17


Pantoprazole* (Pantoprazole*) 40 Mg Tablet.dr, 40 MG PO DAILY@06 for 30 Days


   Prov:CRISPIN RAMIREZ         17


Levothyroxine Sodium* (Synthroid*) 50 Mcg Tablet, 50 MCG PO DAILY@06 for 30 Days

, TAB


   Prov:CRISPIN RAMIREZ         17





Allergies


Allergies:  


Coded Allergies:  


     penicillin G (Verified  Allergy, Intermediate, RASH, HEADACHE, 17)





PMhx/Soc


History of Surgery:  Yes (Hx: biopsy)


Anesthesia Reaction:  No


Hx Neurological Disorder:  No


Hx Respiratory Disorders:  No


Hx Cardiac Disorders:  No


Hx Psychiatric Problems:  No


Hx Miscellaneous Medical Probl:  No


Hx Alcohol Use:  No


Hx Substance Use:  No


Hx Tobacco Use:  No





Physical Exam


Vitals





Vital Signs








  Date Time  Temp Pulse Resp B/P Pulse Ox O2 Delivery O2 Flow Rate FiO2


 


17 17:37 98.7 73 15 130/54 100 Room Air  


 


17 12:52 98.6 78 18 136/63 98   








Physical Exam


 Const:      No acute distress.


 Head:        Atraumatic.


 Eyes:       Normal Conjunctiva.


 ENT:         Normal External Ears, Nose and Mouth.


 Neck:        Full range of motion.  No meningismus.


 Resp:         Clear to auscultation bilaterally.


 Cardio:       Regular rate and rhythm, no murmurs.


 Abd:         Soft,  non distended, normal bowel sounds, diffuse abdominal 

tenderness, more tender than left lower quadrant, no rigidity, rebound, CVA 

tenderness


 Skin:         No petechiae or rashes.


 Back:        No midline or flank tenderness.


 Ext:          No cyanosis, or edema.


 Neur:        Awake and alert. No focal deficit


 Psych:        Normal Mood and Affect.


Result Diagram:  


17 1510                                                                   

             17 1510





Results 24 hrs








 Laboratory Tests








Test


  17


15:10 17


15:30 17


16:49 17


17:11


 


White Blood Count 23.410^3/ul    


 


Red Blood Count 4.0910^6/ul    


 


Hemoglobin 10.7g/dl    


 


Hematocrit 33.8%    


 


Mean Corpuscular Volume 82.6fl    


 


Mean Corpuscular Hemoglobin 26.2pg    


 


Mean Corpuscular Hemoglobin


Concent 31.7g/dl 


  


  


  


 


 


Red Cell Distribution Width 16.9%    


 


Platelet Count 91528^3/UL    


 


Mean Platelet Volume 9.4fl    


 


Neutrophils % 95.0%    


 


Lymphocytes % 3.0%    


 


Monocytes % 1.0%    


 


Eosinophils % 1.0%    


 


Neutrophils # 22.210^3/ul    


 


Lymphocytes # 0.710^3/ul    


 


Monocytes # 0.210^3/ul    


 


Eosinophils # 0.210^3/ul    


 


Platelet Estimate


  PLT APPEAR


INCREASED 


  


  


 


 


Large Platelets OCCASIONAL    


 


Prothrombin Time 14.1Sec    


 


Prothrombin Time Ratio 1.1    


 


INR International Normalized


Ratio 1.09 


  


  


  


 


 


Activated Partial


Thromboplast Time 35.2Sec 


  


  


  


 


 


Sodium Level 133mmol/L    


 


Potassium Level 2.5mmol/L    


 


Chloride Level 94mmol/L    


 


Carbon Dioxide Level 27mmol/L    


 


Anion Gap 15    


 


Blood Urea Nitrogen 8mg/dl    


 


Creatinine 0.58mg/dl    


 


Glucose Level 112mg/dl    


 


Calcium Level 9.3mg/dl    


 


Total Bilirubin 0.2mg/dl    


 


Direct Bilirubin 0.00mg/dl    


 


Indirect Bilirubin 0.2mg/dl    


 


Aspartate Amino Transf


(AST/SGOT) 52IU/L 


  


  


  


 


 


Alanine Aminotransferase


(ALT/SGPT) 58IU/L 


  


  


  


 


 


Alkaline Phosphatase 173IU/L    


 


Troponin I < 0.012ng/ml    


 


Total Protein 6.9g/dl    


 


Albumin 3.4g/dl    


 


Globulin 3.50g/dl    


 


Albumin/Globulin Ratio 0.97    


 


Lactic Acid Level  2.0mmol/L   


 


Magnesium Level   2.3mg/dl  


 


Urine Color    LT. YELLOW 


 


Urine Clarity    CLEAR 


 


Urine pH    6.0 


 


Urine Specific Gravity    <=1.005 


 


Urine Ketones    NEGATIVE 


 


Urine Nitrite    NEGATIVE 


 


Urine Bilirubin    NEGATIVE 


 


Urine Urobilinogen    0.2  E.U./dL 


 


Urine Leukocyte Esterase    NEGATIVE 


 


Urine Microscopic RBC    0-2/HPF 


 


Urine Microscopic WBC    0-2/HPF 


 


Urine Squamous Epithelial


Cells 


  


  


  FEW 


 


 


Urine Hemoglobin    2+ 


 


Urine Glucose    NEGATIVE% 


 


Urine Total Protein    NEGATIVE 








 Current Medications








 Medications


  (Trade)  Dose


 Ordered  Sig/James


 Route


 PRN Reason  Start Time


 Stop Time Status Last Admin


Dose Admin


 


 Sodium Chloride  1,640 ml @ 


 1,640 mls/hr  BOLUS X1 ONCE


 IV


   17 17:30


 17 18:29 DC 17 17:36


 


 


 Potassium Chloride  250 ml @ 


 62.5 mls/hr  ONCE  ONCE


 IVPB


   17 17:30


 17 21:29   


 


 


 Ciprofloxacin/


 Dextrose  200 ml @ 


 200 mls/hr  ONCE  ONCE


 IVPB


   17 17:30


 17 18:29 DC 17 19:37


 


 


 Metronidazole


  (Flagyl 500 Mg


  (Pmx))  100 ml @ 


 100 mls/hr  ONCE  ONCE


 IVPB


   17 17:30


 17 18:29 DC 17 17:36


 


 


 IV Flush 10 ml  10 ml  STK-MED ONCE


 .ROUTE


   17 18:06


 17 18:07 DC 17 18:22


 


 


 Sodium Chloride


  (NS)  100 ml @ ud  STK-MED ONCE


 .ROUTE


   17 18:06


 17 18:07 DC 17 18:22


 


 


 Iohexol


  (Omnipaque 300mg/


 ml)  150 ml  STK-MED ONCE


 .ROUTE


   17 18:06


 17 18:07 DC 17 18:22


 














Procedures/MDM





 [place] [id number]


 Radiology Main Line: [phone]





 DIAGNOSTIC IMAGING REPORT





 Patient: HIEU SANATNA   : 1935   Age: 81  Sex: F                

        


 MR #:    [id number]   Acct #:   [id number]    DOS: 17 1449


 Ordering MD: DORIAN SAMPSON MD   Location:  E/R   Room/Bed:                  

                          


 








PROCEDURE:   XR Chest.


 


CLINICAL INDICATION:   Sepsis


 


TECHNIQUE:   Single frontal chest x-ray. 


 


COMPARISON:   None.


 


FINDINGS:


The lungs are clear of acute infiltrates, edema, effusions, or masses..  The 

cardiomediastinal silhouette is unremarkable. The osseous structures are 

intact.  There is mild scoliosis of the thoracic spine. 


 


IMPRESSION:


No acute cardiopulmonary disease.   


 


RPTAT: JJ


_____________________________________________ 


.Han Maloney MD, MD           Date    Time 


Electronically viewed and signed by .Han Maloney MD, MD on 2017 15:42 


 


D:  2017 15:42  T:  2017 15:42


.L/





CC: DORIAN SAMPSON MD





 [place] [address]


 Radiology Main Line: [phone]





 DIAGNOSTIC IMAGING REPORT





 Patient: HIEU SANTANA   : 1935   Age: 81  Sex: F                

        


 MR #:    [id number]   [facility] #:   [id number]    DOS: 17 1537


 Ordering MD: DORIAN SAMPSON MD   Location:  E/R   Room/Bed:                  

                          


 








PROCEDURE:   CT abdomen and pelvis with contrast. 


 


CLINICAL INDICATION: Abdominal pain. 


 


TECHNIQUE:   CT of the abdomen/pelvis was performed utilizing axial images with 

reconstructions in sagittal and coronal planes following the intravenous 

administration of 90 cc of Omnipaque-300 contrast.  The administered radiation 

dose is CTDI 8.25 mGy, .68 mGy-cm. 


 


COMPARISON: Noncontrast CT of the abdomen/pelvis from 2017.


 


FINDINGS:


 


Lung bases: The lung bases are clear.The heart is normal size without 

pericardial effusion.


 


CT ABDOMEN:


 


Gastrointestinal tract: There is a moderate hiatal hernia measuring 5.2 cm.  

There is no bowel obstruction.The appendix is normal size without inflammatory 

changes. There are extensive colonic diverticula with descending pericolonic 

stranding suggesting acute diverticulitis.  There is no pericolonic fluid 

collection/abscess.  There is no pneumoperitoneum.


 


Liver: The liver is normal in size without focal lesion. A biliary stent is 

noted with distal tip within the duodenum.  There is mild left hepatic 

pneumobilia which is likely postoperative.  There is no intrahepatic ductal 

dilatation.


 


Gallbladder: The gallbladder is grossly unremarkable.


 


Pancreas: The pancreas is grossly unremarkable.


 


Spleen: The spleen is normal in size without focal lesion.


 


Kidneys: The kidneys are normal in size and contour.No renal calculi are 

identified.There is no evidence of hydronephrosis. There are bilateral renal 

cysts with the largest on the left measuring 2.7 cm.


 


Adrenal glands: The bilateral adrenal glands are unremarkable.


 


Retroperitoneum: There is no retroperitoneal adenopathy.The aorta is normal in 

caliber.


 


CT PELVIS:


 


Pelvic organs: The uterus is present.


 


Bladder: The bladder is unremarkable.


 


There is no pelvic free fluid.No pelvic adenopathy is identified.


 


Osseous structures: No destructive lytic or blastic osseous lesion is 

identified.


 


IMPRESSION:


 


1.  Numerous colonic diverticula with descending pericolonic inflammatory 

changes most compatible with acute diverticulitis.  There is no pericolonic 

fluid collection/abscess.


 


2.  New biliary stent with mild left hepatic pneumobilia which is likely 

postoperative. The gallbladder is no longer distended.


 


3.  Moderate hiatal hernia.


 


4.  Bilateral renal cysts.


 


Further findings as detailed above.


 


RPTAT: PP


_____________________________________________ 


.Joe Cervantes MD, MD           Date    Time 


Electronically viewed and signed by .Joe Cervantes MD, MD on 2017 18:49 


 


D:  2017 18:49  T:  2017 18:49


.F/





CC: DORIAN SAMPSON MD





EKG:                           Read by emergency physician


Rate/Rhythm:          Normal Sinus Rhythm 70 beats/min


QRS, ST, T-waves:    No ST elevation, no T inversion, PAC, LVH


Impression:               Abnormal EKG





MEDICAL MAKING DECISION: The patient is a 81-year-old female, presenting with 

acute diverticulitis, acute severe sepsis, acute hypokalemia.  She was treated 

with normal saline 30 mL/kg IV, Cipro IV, Flagyl IV, potassium chloride 40 mEq  

IV





The differential diagnoses considered include but are not limited to 

cholelithiasis, cholecystitis, cystitis, pancreatitis, hepatitis, gastritis, 

peptic ulcer disease, gastric ulcer, appendicitis, diverticulitis, cholangitis, 

choledocholithiasis, partial small bowel obstruction.





Admit MDM:


Patient's infectious symptoms have not stabilized and the patient is at risk of 

rapid decompensation.  The patient will be admitted for careful hydration, 

antibiotic therapy, and infectious source control.





Severe Sepsis criteria: 


Infectious source:   ***Cholecystitis


End organ damage indicated by:***


Lactate > 2.0 mmol/L





Sepsis Management:


Time of recognition of severe sepsis/septic shock:***15:40 hr





Within 3 hours of recognition:


Blood cultures x 2 before broad-spectrum antibiotics:   ***Yes


30 ml/kg NS bolus    ***completed


Initial lactate      ***2


Repeat lactate   ***pending





Critical Care:


Critical care time   35 minutes


Emergent fluid management while maintaining close respiratory support.  

Provision of immediate and broad-spectrum antibiotic therapy.  Simultaneous 

assessment for possible sources in order to direct targeted therapy.  

Consideration for invasive and chemical support to prevent cardiopulmonary 

collapse. 





Septic Shock Assessment:


Any lactic acid > 4.0   ***no


Persistent hypotension (SBP < 90 or 40 mmHg drop, MAP < 65) despite 30 mL/kg IV 

fluid bolus***no





Consultation: I discussed the patient with her general surgeon Dr. Edwards and 

a gastroenterologist Dr. Knapp, who were made aware of the lab, treatment, the 

patient condition.  They accepted the consults





Departure


Diagnosis:  


 Primary Impression:  


 Acute diverticulitis


 Additional Impressions:  


 Severe sepsis


 Hypokalemia


 Anemia


Condition:  Stable


Comments


I discussed the findings with the patient. I discussed the patient with her 

physician Dr. Brooks who was made aware of the lab, the treatment, the 

patient condition and my discussion with the consultants the patient is 

admitted to [facility] at 5:55pm











DORIAN SAMPSON MD 2017 14:49

## 2017-04-18 NOTE — RADRPT
PROCEDURE:   CT abdomen and pelvis with contrast. 

 

CLINICAL INDICATION: Abdominal pain. 

 

TECHNIQUE:   CT of the abdomen/pelvis was performed utilizing axial images with reconstructions in s
agittal and coronal planes following the intravenous administration of 90 cc of Omnipaque-300 contra
st.  The administered radiation dose is CTDI 8.25 mGy, .68 mGy-cm. 

 

COMPARISON: Noncontrast CT of the abdomen/pelvis from April 7, 2017.

 

FINDINGS:

 

Lung bases: The lung bases are clear.The heart is normal size without pericardial effusion.

 

CT ABDOMEN:

 

Gastrointestinal tract: There is a moderate hiatal hernia measuring 5.2 cm.  There is no bowel obstr
uction.The appendix is normal size without inflammatory changes. There are extensive colonic diverti
cula with descending pericolonic stranding suggesting acute diverticulitis.  There is no pericolonic
 fluid collection/abscess.  There is no pneumoperitoneum.

 

Liver: The liver is normal in size without focal lesion. A biliary stent is noted with distal tip wi
thin the duodenum.  There is mild left hepatic pneumobilia which is likely postoperative.  There is 
no intrahepatic ductal dilatation.

 

Gallbladder: The gallbladder is grossly unremarkable.

 

Pancreas: The pancreas is grossly unremarkable.

 

Spleen: The spleen is normal in size without focal lesion.

 

Kidneys: The kidneys are normal in size and contour.No renal calculi are identified.There is no evid
ence of hydronephrosis. There are bilateral renal cysts with the largest on the left measuring 2.7 c
m.

 

Adrenal glands: The bilateral adrenal glands are unremarkable.

 

Retroperitoneum: There is no retroperitoneal adenopathy.The aorta is normal in caliber.

 

CT PELVIS:

 

Pelvic organs: The uterus is present.

 

Bladder: The bladder is unremarkable.

 

There is no pelvic free fluid.No pelvic adenopathy is identified.

 

Osseous structures: No destructive lytic or blastic osseous lesion is identified.

 

IMPRESSION:

 

1.  Numerous colonic diverticula with descending pericolonic inflammatory changes most compatible wi
th acute diverticulitis.  There is no pericolonic fluid collection/abscess.

 

2.  New biliary stent with mild left hepatic pneumobilia which is likely postoperative. The gallblad
kyung is no longer distended.

 

3.  Moderate hiatal hernia.

 

4.  Bilateral renal cysts.

 

Further findings as detailed above.

 

RPTAT: PP

_____________________________________________ 

.Joe Cervantes MD, MD           Date    Time 

Electronically viewed and signed by .Joe Cervantes MD, MD on 04/18/2017 18:49 

 

D:  04/18/2017 18:49  T:  04/18/2017 18:49

.F/

## 2017-04-19 VITALS — RESPIRATION RATE: 18 BRPM | SYSTOLIC BLOOD PRESSURE: 146 MMHG | DIASTOLIC BLOOD PRESSURE: 63 MMHG

## 2017-04-19 VITALS — DIASTOLIC BLOOD PRESSURE: 67 MMHG | SYSTOLIC BLOOD PRESSURE: 143 MMHG | RESPIRATION RATE: 16 BRPM

## 2017-04-19 VITALS — RESPIRATION RATE: 20 BRPM | SYSTOLIC BLOOD PRESSURE: 136 MMHG | DIASTOLIC BLOOD PRESSURE: 63 MMHG

## 2017-04-19 LAB
ADD SCAN DIFF: NO
ANION GAP SERPL CALC-SCNC: 11 MMOL/L (ref 8–16)
BASOPHILS # BLD AUTO: 0.1 10^3/UL (ref 0–0.1)
BASOPHILS NFR BLD: 0.6 % (ref 0–2)
BUN SERPL-MCNC: 4 MG/DL (ref 7–20)
CALCIUM SERPL-MCNC: 7.8 MG/DL (ref 8.4–10.2)
CHLORIDE SERPL-SCNC: 107 MMOL/L (ref 97–110)
CO2 SERPL-SCNC: 24 MMOL/L (ref 21–31)
CREAT SERPL-MCNC: 0.42 MG/DL (ref 0.44–1)
EOSINOPHIL # BLD: 0.1 10^3/UL (ref 0–0.5)
EOSINOPHIL NFR BLD: 1.2 % (ref 0–7)
ERYTHROCYTE [DISTWIDTH] IN BLOOD BY AUTOMATED COUNT: 16.9 % (ref 11.5–14.5)
GLUCOSE SERPL-MCNC: 118 MG/DL (ref 70–220)
HCT VFR BLD CALC: 26.8 % (ref 37–47)
HGB BLD-MCNC: 8.6 G/DL (ref 12–16)
LYMPHOCYTES # BLD AUTO: 1.7 10^3/UL (ref 0.8–2.9)
LYMPHOCYTES NFR BLD AUTO: 14.6 % (ref 15–51)
MAGNESIUM SERPL-MCNC: 2 MG/DL (ref 1.7–2.5)
MCH RBC QN AUTO: 26.8 PG (ref 29–33)
MCHC RBC AUTO-ENTMCNC: 32.1 G/DL (ref 32–37)
MCV RBC AUTO: 83.5 FL (ref 82–101)
MONOCYTES # BLD: 0.8 10^3/UL (ref 0.3–0.9)
MONOCYTES NFR BLD: 6.9 % (ref 0–11)
NEUTROPHILS # BLD: 9 10^3/UL (ref 1.6–7.5)
NEUTROPHILS NFR BLD AUTO: 75.9 % (ref 39–77)
NRBC # BLD MANUAL: 0 10^3/UL (ref 0–0)
NRBC BLD QL: 0 /100WBC (ref 0–0)
PLATELET # BLD: 501 10^3/UL (ref 140–415)
PMV BLD AUTO: 9.3 FL (ref 7.4–10.4)
POTASSIUM SERPL-SCNC: 2.8 MMOL/L (ref 3.5–5.1)
RBC # BLD AUTO: 3.21 10^6/UL (ref 4.2–5.4)
SODIUM SERPL-SCNC: 139 MMOL/L (ref 135–144)
WBC # BLD AUTO: 11.9 10^3/UL (ref 4.8–10.8)

## 2017-04-19 RX ADMIN — Medication SCH MLS/HR: at 02:05

## 2017-04-19 RX ADMIN — SENNOSIDES SCH TAB: 8.6 TABLET, FILM COATED ORAL at 08:56

## 2017-04-19 RX ADMIN — AZTREONAM SCH MLS/HR: 1 INJECTION, POWDER, LYOPHILIZED, FOR SOLUTION INTRAMUSCULAR; INTRAVENOUS at 01:29

## 2017-04-19 RX ADMIN — CALCIUM GLUCONATE SCH MLS/HR: 94 INJECTION, SOLUTION INTRAVENOUS at 01:23

## 2017-04-19 RX ADMIN — LEVOTHYROXINE SODIUM SCH MCG: 50 TABLET ORAL at 07:33

## 2017-04-19 RX ADMIN — AZTREONAM SCH MLS/HR: 1 INJECTION, POWDER, LYOPHILIZED, FOR SOLUTION INTRAMUSCULAR; INTRAVENOUS at 07:31

## 2017-04-19 RX ADMIN — SENNOSIDES SCH TAB: 8.6 TABLET, FILM COATED ORAL at 21:00

## 2017-04-19 RX ADMIN — ENOXAPARIN SODIUM SCH MG: 100 INJECTION SUBCUTANEOUS at 09:00

## 2017-04-19 RX ADMIN — AZTREONAM SCH MLS/HR: 1 INJECTION, POWDER, LYOPHILIZED, FOR SOLUTION INTRAMUSCULAR; INTRAVENOUS at 22:05

## 2017-04-19 RX ADMIN — VITAMIN D, TAB 1000IU (100/BT) SCH UNIT: 25 TAB at 08:56

## 2017-04-19 RX ADMIN — Medication SCH MLS/HR: at 05:58

## 2017-04-19 RX ADMIN — Medication SCH MLS/HR: at 22:43

## 2017-04-19 RX ADMIN — PANTOPRAZOLE SODIUM SCH MG: 40 TABLET, DELAYED RELEASE ORAL at 07:33

## 2017-04-19 RX ADMIN — AZTREONAM SCH MLS/HR: 1 INJECTION, POWDER, LYOPHILIZED, FOR SOLUTION INTRAMUSCULAR; INTRAVENOUS at 14:58

## 2017-04-19 RX ADMIN — ACARBOSE SCH MG: 50 TABLET ORAL at 09:00

## 2017-04-19 RX ADMIN — VALSARTAN SCH MG: 160 TABLET, FILM COATED ORAL at 08:56

## 2017-04-19 RX ADMIN — CALCIUM GLUCONATE SCH MLS/HR: 94 INJECTION, SOLUTION INTRAVENOUS at 14:02

## 2017-04-19 RX ADMIN — Medication SCH MLS/HR: at 15:51

## 2017-04-19 NOTE — HP
DATE OF ADMISSION: 04/18/2017

 

CHIEF COMPLAINT:  Abdominal pain.

 

HISTORY OF PRESENT ILLNESS:  The patient is an 81-year-old female with history of hypertension, dysl
ipidemia, rheumatoid arthritis, hypothyroidism and also history of diverticulosis was recently admit
arlet to [facility] for right upper quadrant pain and was diagnosed with possible ac
[place] cholecystitis.  The patient at that time was seen by Dr. Knapp and Dr. Edwards.  The patient un
derwent ERCP.  Did have CT scan of abdomen and MRI at that time, which did reveal colonic diverticul
osis and moderate to large hiatal hernia and possible acute cholecystitis without gallstones.  The p
atelizabeth underwent ERCP and stent placement and was discharged home recently on Levaquin after she rec
eived several days of IV Zosyn.  The patient went to see Dr. Miguel for routine followup and was com
plaining of constipation and abdominal pain.  The patient was given laxative and did have a bowel mo
vement today.  However, due to abdominal pain, patient underwent CBC and CMP as an outpatient.  Her 
white count came back as 36,000 from the CBC drawn yesterday.  The patient was sent to ER for furthe
r evaluation and management.  The patient did have a bowel movement today.  Denies any vomiting.  Th
e patient does have abdominal pain mostly in the left lower quadrant and only minimal in the left lo
wer quadrant.  No reported leg edema.  No reported chest pain, shortness of breath.  No reported fev
er or chills.  No reported acute skin rash.  No reported dysuria or hematuria.  No reported bleeding
 from any site.  No reported headache, dizziness, syncope.  No history of sore throat, cough, shortn
ess of breath.

 

REVIEW OF SYSTEMS:  A total of 12 systems were reviewed and all pertinent positive and negative find
ings have been described in HPI.  

 

The patient was seen in the ER and a repeat evaluation.  CBC today revealed white count 23.4 thousan
d with 95% neutrophils.  CT of the abdomen and pelvis done in the ER revealed acute diverticulitis w
ithout pericolonic fluid or abscess, a new biliary stent with mild left hepatic pneumobilia, which i
s likely postoperative, moderate hiatal hernia.  The patient is being admitted for further evaluatio
n and management.  Gallbladder was not distended and was normal.  Dr. Edwards has been called from s
urgical standpoint by Dr. Rushing.

 

PAST MEDICAL HISTORY:  As stated above.  The patient did have a colonoscopy back in 2010, which had 
revealed extensive diverticulosis.  That was done by Dr. Castaneda.  The patient has known hiatal javed
ia.  Past medical history is significant for rheumatoid arthritis for which patient is being followe
d by Dr. Dunn and was recently started on methotrexate.

 

PAST SURGICAL HISTORY:  None.  The patient is status post recent ERCP.

 

FAMILY HISTORY:  Noncontributory for patient's condition.

 

SOCIAL HISTORY:  The patient lives with the family.  No smoking, no alcohol.

 

ALLERGIES:  PENICILLIN.

 

MEDICATIONS:  Prior to admission, the patient was on:  

1.  Levaquin.  

2.  Diovan.

3.  Colace. 

4.  Nexium.

5.  Precose for prediabetic condition.  

6.  Levoxyl.

7.  Vitamin D.

 

PHYSICAL EXAMINATION:

GENERAL:  The patient is conscious, awake, alert, fairly oriented.

VITAL SIGNS:  Temperature 98.7, pulse 70, respirations 15, blood pressure 130/54, O2 saturation 100%
 on room air.

HEENT:  Atraumatic, normocephalic.  Conjunctivae and lids normal.  Extraocular movements intact.  Or
opharynx clear.

NECK:  Supple.  No mass, no thyromegaly.

CHEST:  Fairly clear.  No use of accessory muscles.

CARDIOVASCULAR:  S1, S2 normal.  No murmur, gallop, or rub.

ABDOMEN:  Soft, nondistended.  Left lower quadrant tenderness present.  There is minimal tenderness 
in the right side of the right upper abdomen also, but only minimal.  No guarding or rigidity.  Darwin
l sounds plus.

EXTREMITIES:  No leg edema.  No clubbing or cyanosis.

SKIN:  Without acute rash or ulcer.

NEUROLOGIC:  The patient is awake, alert, fairly oriented with no gross focal deficit.

 

LABORATORY:  WBC 23.4, hemoglobin 10.7, platelets 628.  Chemistry:  Sodium 133, potassium 2.5, BUN 8
, creatinine 0.5, glucose 112.  Lactic acid 2.  Repeat lactic acid 1.3.  AST 52, ALT 58, alkaline ph
osphatase 173.  Troponin negative.  

 

IMPRESSION:

1.  Acute cholecystitis.

2.  History of recent acute cholecystitis, although gallbladder ultrasound was negative for stone, s
tatus post ERCP and stenting.

3.  Hypertension.

4.  Rheumatoid arthritis.

5.  Hypothyroidism.

6.  Dyslipidemia.

 

PLAN:  The patient will be admitted on medical floor.  The patient will be started on aztreonam and 
Flagyl.  The patient will be given IV fluid.  For hypertension, the patient will be continued on Jaycob
van.  As far as the hypothyroidism, will continue Levoxyl.  Will hold off on her rheumatoid arthriti
s medication for now, especially in fact will be seen by Dr. Dunn as an outpatient for further f
ollowup on her rheumatoid arthritis.  The patient's glucose is only 112.  Will continue to monitor. 
 Will give Lovenox for DVT prophylaxis.  We will start her on clear liquid diet for now and will adv
ance as tolerated.  Will do followup labs in the morning.  Plan of care discussed with the ER physic
philip, Dr. Rushing, and the patient's daughter.  Further recommendation depends on the patient's hospital
 course.

 

 

Dictated By: VIOLETTE OLMOS/GRAHAM

DD:    04/18/2017 21:50:58

DT:    04/19/2017 00:13:49

Conf#: [id number]

DID#:  [id number]

## 2017-04-19 NOTE — CONS
Date/Time of Note


Date/Time of Note


DATE: 4/18/17 


TIME: 23:38





Assessment/Plan


Assessment/Plan


Chief Complaint/Hosp Course


1.  Abdominal pain, leukocytosis, and CT are suggestive of sigmoid 

diverticulitis


-abx


-ivf


-supportive


2.  Recent Cholangitis and ? Cholecystitis s/p abx, ercp, and stent


-GI eval to rule out stent as source 


3.  Significant leukocytosis secondary to above.  Continue antibiotics and 

supportive care and treatment as above.


4.  Hypertension.  Continue nutrition and medication optimization.


5.  Prediabetes.  Continue nutrition and medication optimization.


6.  Rheumatoid arthritis, on methotrexate and steroids shots.  Continue medical 

optimization.


7.  Hypothyroidism.  Continue Synthroid.


8.  Hyperlipidemia.  Continue diet and medication optimization.


9.  History of gastritis.  Continue proton pump inhibitor and encourage 

nutritional and lifestyle optimization.


10.  Anemia without evidence of acute blood loss, continue monitoring.


11.  Electrolyte abnormalities.  Please correct with fluid and nutritional 

management.


 


Thank you very much for consulting me in this patient's care.





Late entry 4/18


Problems:  





Consultation Date/Type/Reason


Admit Date/Time


Apr 18, 2017 at 18:59


Date of Consultation:  Apr 18, 2017


Type of Consultation:  General Surgical


Reason for Consultation


Abdominal pain


Leukocytosis


Diverticulitis


Recent cholangitis


Referring Provider:  VIOLETTE GALDAMEZ MD





Hx of Present Illness


Makayla Diaz is an 81-year-old female with multiple comorbidities who 

presents with abdominal pain, worse in the past day, associated with 

constipation but no nausea, vomiting, fevers or chills.  No chest pain or 

shortness of breath.  No visual or neurologic changes.  No dysuria or vaginal 

discharge.  No trauma or sick contacts.  No cough.  Positive bowel function.  

She was referred by Dr. Miguel for further evaluation and treatment.


 


In the emergency room, she was found to be afebrile with stable vitals.  White 

count, however, is elevated at 23,000.  CT identifies improved gb but left 

sided diverticulitis.  She is admitted for further care and treatment.  

Surgical consult was obtained for further evaluation and treatment.


12-point of systems was negative unless addressed in the HPI.  No significant 

weight changes.


Psychological:  no complaints





Past Medical History


1.  Prediabetes.


2.  Hypertension.


3.  Diverticulosis & current diverticulitis


4.  Bilateral renal cortical and parapelvic cysts.


5.  Aortoiliac atherosclerosis.


6.  Hiatal hernia.


7.  Bronchiectasis.  


8.  Cholangitis & Cholecystitis


9.  Significant leukocytosis.


10.  Hyponatremia.


11.  Hyperkalemia.


12.  Elevated alkaline phosphatase.


13.  Anemia.


14.  Hypothyroidism.


15.  Gastritis.


16.  Arthritis.





Past Surgical History


Colonoscopy


ERCP and stenting





Family History


Significant Family History:  no pertinent family hx





Social History


Denies alcohol, drugs or tobacco


Smoking Status:  Never smoker





Exam/Review of Systems


Vital Signs


Vitals





 Vital Signs








  Date Time  Temp Pulse Resp B/P Pulse Ox O2 Delivery O2 Flow Rate FiO2


 


4/19/17 21:59 98.4 63 18 146/63 98   


 


4/18/17 23:00      Room Air  














 Intake and Output   


 


 4/18/17 4/18/17 4/19/17





 15:00 23:00 07:00


 


Intake Total  1940 ml 670 ml


 


Output Total   800 ml


 


Balance  1940 ml -130 ml











Exam


GENERAL:  No acute distress, however, uncomfortable.


HEENT:  Pupils equal, reactive.  No scleral icterus.  Mucous membranes are 

moist.


NECK:  Supple, no JVD, no lymphadenopathy.  


PULMONARY:  Normal respiratory effort. No wheezing.


HEART:  S1, S2 present and regular.


ABDOMEN:  Soft, tender in left lower quadrant without rebound, nor guarding,  

nor rigidity.


EXTREMITIES:  No edema.


VASCULAR:  Cap refill less than 2 seconds.


NEUROLOGIC:  Alert, oriented, moves all 4 extremities grossly.





Results


Result Diagram:  


4/19/17 0553                                                                   

             4/19/17 0533





Results 24 hrs





Laboratory Tests








Test


  4/19/17


05:33 4/19/17


05:53 4/19/17


07:51 4/19/17


12:18


 


Sodium Level 139     


 


Potassium Level 2.8  *L   


 


Chloride Level 107  #   


 


Carbon Dioxide Level 24     


 


Anion Gap 11     


 


Blood Urea Nitrogen 4  L   


 


Creatinine 0.42  L   


 


Glucose Level 118     


 


Calcium Level 7.8  L   


 


Magnesium Level 2.0     


 


White Blood Count  11.9  #H  


 


Red Blood Count  3.21  #L  


 


Hemoglobin  8.6  L  


 


Hematocrit  26.8  #L  


 


Mean Corpuscular Volume  83.5    


 


Mean Corpuscular Hemoglobin  26.8  L  


 


Mean Corpuscular Hemoglobin


Concent 


  32.1  


  


  


 


 


Red Cell Distribution Width  16.9  H  


 


Platelet Count  501  #H  


 


Mean Platelet Volume  9.3    


 


Neutrophils %  75.9    


 


Lymphocytes %  14.6  L  


 


Monocytes %  6.9    


 


Eosinophils %  1.2    


 


Basophils %  0.6    


 


Nucleated Red Blood Cells %  0.0    


 


Neutrophils #  9.0  H  


 


Lymphocytes #  1.7    


 


Monocytes #  0.8    


 


Eosinophils #  0.1    


 


Basophils #  0.1    


 


Nucleated Red Blood Cells #  0.0    


 


Bedside Glucose   108   114  














Test


  4/19/17


17:02 


  


  


 


 


Bedside Glucose 100     











Medications


Medications





 Current Medications


Hydralazine HCl 10 mg 10 mg Q6H  PRN IV SBP ABOVE 170;  Start 4/19/17 at 00:30


Aztreonam 50 ml @  100 mls/hr Q8 IVPB  Last administered on 4/19/17at 22:05; 

Admin Dose 100 MLS/HR;  Start 4/19/17 at 00:30


Metronidazole (Flagyl 500 Mg (Pmx)) 100 ml @  100 mls/hr Q8 IVPB  Last 

administered on 4/19/17at 22:43; Admin Dose 100 MLS/HR;  Start 4/19/17 at 00:30


Acetaminophen (Tylenol Tab) 500 mg Q4H  PRN PO PAIN AND OR ELEVATED TEMP;  

Start 4/19/17 at 00:30


Morphine Sulfate (morphine) 2 mg Q4H  PRN IV PAIN;  Start 4/19/17 at 00:30


Enoxaparin Sodium 40 mg 40 mg DAILY SC ;  Start 4/19/17 at 09:00


Potassium Chloride/Sodium Chloride (KCl/1/2 NS) 1,015 ml @  75 mls/hr [id number] 

IV  Last administered on 4/19/17at 01:23; Admin Dose 75 MLS/HR;  Start 4/19/17 

at 00:30


Senna (Senokot) 2 tab BID PO  Last administered on 4/19/17at 08:56; Admin Dose 

2 TAB;  Start 4/19/17 at 09:00


Cholecalciferol (Vitamin D) 5,000 unit DAILY PO  Last administered on 4/19/17at 

08:56; Admin Dose 5,000 UNIT;  Start 4/19/17 at 09:00


Valsartan (Diovan) 160 mg DAILY PO  Last administered on 4/19/17at 08:56; Admin 

Dose 160 MG;  Start 4/19/17 at 09:00


Pantoprazole (Protonix Tab) 40 mg DAILY@06 PO  Last administered on 4/19/17at 07

:33; Admin Dose 40 MG;  Start 4/19/17 at 06:00


Ondansetron HCl (Zofran Inj) 4 mg Q4H  PRN IV NAUSEA AND/OR VOMITING;  Start 4/ 19/17 at 00:30


Miscellaneous Information 1 ea NOTE XX ;  Start 4/19/17 at 03:45


Glucose (Glutose) 15 gm Q15M  PRN PO DECREASED GLUCOSE;  Start 4/19/17 at 03:45


Glucose (Glutose) 22.5 gm Q15M  PRN PO DECREASED GLUCOSE;  Start 4/19/17 at 03:

45


Dextrose (D50w Syringe) 25 ml Q15M  PRN IV DECREASED GLUCOSE;  Start 4/19/17 at 

03:45


Dextrose (D50w Syringe) 50 ml Q15M  PRN IV DECREASED GLUCOSE;  Start 4/19/17 at 

03:45


Glucagon (Glucagen) 1 mg Q15M  PRN IM DECREASED GLUCOSE;  Start 4/19/17 at 03:45


Glucose (Glutose) 15 gm Q15M  PRN BUCCAL DECREASED GLUCOSE;  Start 4/19/17 at 03

:45











LIANET GROVER MD Apr 19, 2017 23:48

## 2017-04-19 NOTE — PN
Date/Time of Note


Date/Time of Note


DATE: 4/19/17 


TIME: 14:39





Assessment/Plan


VTE Prophylaxis


VTE Prophylaxis Intervention:  SCD's





Lines/Catheters


IV Catheter Type (from [facility]):  Peripheral IV


Urinary Cath still in place:  No





Assessment/Plan


Chief Complaint/Hosp Course


1.  Acute diverticulitis.  Continue aztreonam and Flagyl.


2.  History of recent acute cholecystitis, although gallbladder ultrasound was 

negative for stone, status post ERCP and stenting.


3.  Hypertension.  Continue Diovan.


4.  Rheumatoid arthritis.


5.  Hypothyroidism.  Continue levothyroxine.


6.  Dyslipidemia.


Further recommendations based on clinical course.  Plan of care discussed with 

Dr. Brooks.


Problems:  





Subjective


24 Hr Interval Summary


Free Text/Dictation


Patient stated improvement in abdominal pain, able to tolerate liquid diet well

, white blood cells trending down.





Exam/Review of Systems


Vital Signs


Vitals





 Vital Signs








  Date Time  Temp Pulse Resp B/P Pulse Ox O2 Delivery O2 Flow Rate FiO2


 


4/19/17 07:31 97.6 69 16 143/67 96   


 


4/18/17 23:00      Room Air  














 Intake and Output   


 


 4/18/17 4/18/17 4/19/17





 15:00 23:00 07:00


 


Intake Total  1940 ml 670 ml


 


Output Total   800 ml


 


Balance  1940 ml -130 ml











Exam


Constitutional:  alert, well developed


Psych:  no complaints


Head:  atraumatic, normocephalic


Eyes:  nl conjunctiva


ENMT:  nl external ears & nose


Neck:  non-tender, supple


Respiratory:  clear to auscultation, normal air movement


Cardiovascular:  nl pulses, regular rate and rhythm


Gastrointestinal:  soft, tender


Musculoskeletal:  nl extremities to inspection


Extremities:  normal pulses


Neurological:  CNS II-XII intact





Results


Result Diagram:  


4/19/17 0553                                                                   

             4/19/17 0533





Results 24 hrs





Laboratory Tests








Test


  4/18/17


15:10 4/18/17


15:30 4/18/17


16:49 4/18/17


17:11


 


White Blood Count 23.4  #H   


 


Red Blood Count 4.09  L   


 


Hemoglobin 10.7  L   


 


Hematocrit 33.8  L   


 


Mean Corpuscular Volume 82.6     


 


Mean Corpuscular Hemoglobin 26.2  L   


 


Mean Corpuscular Hemoglobin


Concent 31.7  L


  


  


  


 


 


Red Cell Distribution Width 16.9  H   


 


Platelet Count 628  H   


 


Mean Platelet Volume 9.4     


 


Neutrophils % 95.0  H   


 


Lymphocytes % 3.0  L   


 


Monocytes % 1.0     


 


Eosinophils % 1.0     


 


Neutrophils # 22.2  H   


 


Lymphocytes # 0.7  L   


 


Monocytes # 0.2  L   


 


Eosinophils # 0.2     


 


Platelet Estimate


  PLT APPEAR


INCREASED 


  


  


 


 


Large Platelets OCCASIONAL     


 


Prothrombin Time 14.1     


 


Prothrombin Time Ratio 1.1     


 


INR International Normalized


Ratio 1.09  


  


  


  


 


 


Activated Partial


Thromboplast Time 35.2  H


  


  


  


 


 


Sodium Level 133  L   


 


Potassium Level 2.5  *L   


 


Chloride Level 94  L   


 


Carbon Dioxide Level 27     


 


Anion Gap 15     


 


Blood Urea Nitrogen 8     


 


Creatinine 0.58     


 


Glucose Level 112     


 


Calcium Level 9.3     


 


Total Bilirubin 0.2     


 


Direct Bilirubin 0.00     


 


Indirect Bilirubin 0.2     


 


Aspartate Amino Transf


(AST/SGOT) 52  H


  


  


  


 


 


Alanine Aminotransferase


(ALT/SGPT) 58  


  


  


  


 


 


Alkaline Phosphatase 173  H   


 


Troponin I < 0.012     


 


Total Protein 6.9     


 


Albumin 3.4     


 


Globulin 3.50  H   


 


Albumin/Globulin Ratio 0.97     


 


Lactic Acid Level  2.0    


 


Magnesium Level   2.3   


 


Urine Color    LT. YELLOW  


 


Urine Clarity    CLEAR  


 


Urine pH    6.0  


 


Urine Specific Gravity    <=1.005  L


 


Urine Ketones    NEGATIVE  


 


Urine Nitrite    NEGATIVE  


 


Urine Bilirubin    NEGATIVE  


 


Urine Urobilinogen    0.2  E.U./dL  


 


Urine Leukocyte Esterase    NEGATIVE  


 


Urine Microscopic RBC    0-2  


 


Urine Microscopic WBC    0-2  


 


Urine Squamous Epithelial


Cells 


  


  


  FEW  


 


 


Urine Hemoglobin    2+  H


 


Urine Glucose    NEGATIVE  


 


Urine Total Protein    NEGATIVE  














Test


  4/18/17


19:45 4/18/17


21:25 4/19/17


05:33 4/19/17


05:53


 


Lactic Acid Level 1.3   1.8    


 


Sodium Level   139   


 


Potassium Level   2.8  *L 


 


Chloride Level   107  # 


 


Carbon Dioxide Level   24   


 


Anion Gap   11   


 


Blood Urea Nitrogen   4  L 


 


Creatinine   0.42  L 


 


Glucose Level   118   


 


Calcium Level   7.8  L 


 


Magnesium Level   2.0   


 


White Blood Count    11.9  #H


 


Red Blood Count    3.21  #L


 


Hemoglobin    8.6  L


 


Hematocrit    26.8  #L


 


Mean Corpuscular Volume    83.5  


 


Mean Corpuscular Hemoglobin    26.8  L


 


Mean Corpuscular Hemoglobin


Concent 


  


  


  32.1  


 


 


Red Cell Distribution Width    16.9  H


 


Platelet Count    501  #H


 


Mean Platelet Volume    9.3  


 


Neutrophils %    75.9  


 


Lymphocytes %    14.6  L


 


Monocytes %    6.9  


 


Eosinophils %    1.2  


 


Basophils %    0.6  


 


Nucleated Red Blood Cells %    0.0  


 


Neutrophils #    9.0  H


 


Lymphocytes #    1.7  


 


Monocytes #    0.8  


 


Eosinophils #    0.1  


 


Basophils #    0.1  


 


Nucleated Red Blood Cells #    0.0  














Test


  4/19/17


07:51 4/19/17


12:18 


  


 


 


Bedside Glucose 108   114    











Medications


Medications





 Current Medications


Hydralazine HCl 10 mg 10 mg Q6H  PRN IV SBP ABOVE 170;  Start 4/19/17 at 00:30


Aztreonam 50 ml @  100 mls/hr Q8 IVPB  Last administered on 4/19/17at 07:31; 

Admin Dose 100 MLS/HR;  Start 4/19/17 at 00:30


Metronidazole (Flagyl 500 Mg (Pmx)) 100 ml @  100 mls/hr Q8 IVPB  Last 

administered on 4/19/17at 02:05; Admin Dose 100 MLS/HR;  Start 4/19/17 at 00:30


Acetaminophen (Tylenol Tab) 500 mg Q4H  PRN PO PAIN AND OR ELEVATED TEMP;  

Start 4/19/17 at 00:30


Morphine Sulfate (morphine) 2 mg Q4H  PRN IV PAIN;  Start 4/19/17 at 00:30


Enoxaparin Sodium 40 mg 40 mg DAILY SC ;  Start 4/19/17 at 09:00


Potassium Chloride/Sodium Chloride (KCl/1/2 NS) 1,015 ml @  75 mls/hr [id number] 

IV  Last administered on 4/19/17at 01:23; Admin Dose 75 MLS/HR;  Start 4/19/17 

at 00:30


Senna (Senokot) 2 tab BID PO  Last administered on 4/19/17at 08:56; Admin Dose 

2 TAB;  Start 4/19/17 at 09:00


Cholecalciferol (Vitamin D) 5,000 unit DAILY PO  Last administered on 4/19/17at 

08:56; Admin Dose 5,000 UNIT;  Start 4/19/17 at 09:00


Valsartan (Diovan) 160 mg DAILY PO  Last administered on 4/19/17at 08:56; Admin 

Dose 160 MG;  Start 4/19/17 at 09:00


Pantoprazole (Protonix Tab) 40 mg DAILY@06 PO  Last administered on 4/19/17at 07

:33; Admin Dose 40 MG;  Start 4/19/17 at 06:00


Ondansetron HCl (Zofran Inj) 4 mg Q4H  PRN IV NAUSEA AND/OR VOMITING;  Start 4/ 19/17 at 00:30


Miscellaneous Information 1 ea NOTE XX ;  Start 4/19/17 at 03:45


Glucose (Glutose) 15 gm Q15M  PRN PO DECREASED GLUCOSE;  Start 4/19/17 at 03:45


Glucose (Glutose) 22.5 gm Q15M  PRN PO DECREASED GLUCOSE;  Start 4/19/17 at 03:

45


Dextrose (D50w Syringe) 25 ml Q15M  PRN IV DECREASED GLUCOSE;  Start 4/19/17 at 

03:45


Dextrose (D50w Syringe) 50 ml Q15M  PRN IV DECREASED GLUCOSE;  Start 4/19/17 at 

03:45


Glucagon (Glucagen) 1 mg Q15M  PRN IM DECREASED GLUCOSE;  Start 4/19/17 at 03:45


Glucose (Glutose) 15 gm Q15M  PRN BUCCAL DECREASED GLUCOSE;  Start 4/19/17 at 03

:45











RADHA DE JESUS Apr 19, 2017 14:45

## 2017-04-19 NOTE — PN
Date/Time of Note


Date/Time of Note


DATE: 4/19/17 


TIME: 23:53





Assessment/Plan


Lines/Catheters


IV Catheter Type (from [facility]):  Peripheral IV


Riojas in Place (from [facility]):  No





Assessment/Plan


Chief Complaint/Hosp Course


1.  Abdominal pain, leukocytosis, and CT are suggestive of sigmoid 

diverticulitis.  Improving


-abx


-ivf


-supportive


2.  Recent Cholangitis and ? Cholecystitis s/p abx, ercp, and stent


-GI eval to rule out stent as source 


3.  Leukocytosis secondary to above.  Continue antibiotics and supportive care 

and treatment as above.  Improved.


4.  Hypertension.  Continue nutrition and medication optimization.


5.  Prediabetes.  Continue nutrition and medication optimization.


6.  Rheumatoid arthritis, on methotrexate and steroids shots.  Continue medical 

optimization.


7.  Hypothyroidism.  Continue Synthroid.


8.  Hyperlipidemia.  Continue diet and medication optimization.


9.  History of gastritis.  Continue proton pump inhibitor and encourage 

nutritional and lifestyle optimization.


10.  Anemia without evidence of acute blood loss, continue monitoring.


11.  Electrolyte abnormalities.  Please correct with fluid and nutritional 

management.


 


Thank you,


Problems:  





Subjective


24 Hr Interval Summary


No f/c.  No n/v.  No cp/sob.  Pain has improved.  No cough.  No ha/dizzy/visual 

or neurological changes.  Leukocytosis improved.





Exam/Review of Systems


Vital Signs


Vitals





 Vital Signs








  Date Time  Temp Pulse Resp B/P Pulse Ox O2 Delivery O2 Flow Rate FiO2


 


4/19/17 21:59 98.4 63 18 146/63 98   


 


4/18/17 23:00      Room Air  














 Intake and Output   


 


 4/18/17 4/18/17 4/19/17





 15:00 23:00 07:00


 


Intake Total  1940 ml 670 ml


 


Output Total   800 ml


 


Balance  1940 ml -130 ml











Exam


Free Text/Dictation


GENERAL:  No acute distress, however, uncomfortable.


HEENT:  Pupils equal, reactive.  No scleral icterus.  Mucous membranes are 

moist.


NECK:  Supple, no JVD, no lymphadenopathy.  


PULMONARY:  Normal respiratory effort. No wheezing.


HEART:  S1, S2 present and regular.


ABDOMEN:  Soft, very min tenderness in left lower quadrant without rebound, nor 

guarding,  nor rigidity.


EXTREMITIES:  No edema.


VASCULAR:  Cap refill less than 2 seconds.


NEUROLOGIC:  Alert, oriented, moves all 4 extremities grossly.





Results


Result Diagram:  


4/19/17 0553                                                                   

             4/19/17 0533














LIANET GROVER MD Apr 19, 2017 23:55

## 2017-04-20 VITALS — SYSTOLIC BLOOD PRESSURE: 143 MMHG | DIASTOLIC BLOOD PRESSURE: 65 MMHG | RESPIRATION RATE: 16 BRPM

## 2017-04-20 VITALS — RESPIRATION RATE: 16 BRPM | DIASTOLIC BLOOD PRESSURE: 71 MMHG | SYSTOLIC BLOOD PRESSURE: 164 MMHG

## 2017-04-20 VITALS — RESPIRATION RATE: 20 BRPM | SYSTOLIC BLOOD PRESSURE: 139 MMHG | DIASTOLIC BLOOD PRESSURE: 65 MMHG

## 2017-04-20 LAB
ADD SCAN DIFF: NO
ANION GAP SERPL CALC-SCNC: 11 MMOL/L (ref 8–16)
BASOPHILS # BLD AUTO: 0.1 10^3/UL (ref 0–0.1)
BASOPHILS NFR BLD: 0.7 % (ref 0–2)
BUN SERPL-MCNC: 2 MG/DL (ref 7–20)
CALCIUM SERPL-MCNC: 8.5 MG/DL (ref 8.4–10.2)
CHLORIDE SERPL-SCNC: 107 MMOL/L (ref 97–110)
CO2 SERPL-SCNC: 23 MMOL/L (ref 21–31)
CREAT SERPL-MCNC: 0.41 MG/DL (ref 0.44–1)
EOSINOPHIL # BLD: 0.2 10^3/UL (ref 0–0.5)
EOSINOPHIL NFR BLD: 2.2 % (ref 0–7)
ERYTHROCYTE [DISTWIDTH] IN BLOOD BY AUTOMATED COUNT: 17.2 % (ref 11.5–14.5)
GLUCOSE SERPL-MCNC: 103 MG/DL (ref 70–220)
HCT VFR BLD CALC: 28.1 % (ref 37–47)
HGB BLD-MCNC: 8.8 G/DL (ref 12–16)
LYMPHOCYTES # BLD AUTO: 1.7 10^3/UL (ref 0.8–2.9)
LYMPHOCYTES NFR BLD AUTO: 19.4 % (ref 15–51)
MCH RBC QN AUTO: 26.1 PG (ref 29–33)
MCHC RBC AUTO-ENTMCNC: 31.3 G/DL (ref 32–37)
MCV RBC AUTO: 83.4 FL (ref 82–101)
MONOCYTES # BLD: 0.6 10^3/UL (ref 0.3–0.9)
MONOCYTES NFR BLD: 6.9 % (ref 0–11)
NEUTROPHILS # BLD: 6.2 10^3/UL (ref 1.6–7.5)
NEUTROPHILS NFR BLD AUTO: 69.8 % (ref 39–77)
NRBC # BLD MANUAL: 0 10^3/UL (ref 0–0)
NRBC BLD QL: 0 /100WBC (ref 0–0)
PLATELET # BLD: 586 10^3/UL (ref 140–415)
PMV BLD AUTO: 9.3 FL (ref 7.4–10.4)
POTASSIUM SERPL-SCNC: 3.4 MMOL/L (ref 3.5–5.1)
RBC # BLD AUTO: 3.37 10^6/UL (ref 4.2–5.4)
SODIUM SERPL-SCNC: 138 MMOL/L (ref 135–144)
WBC # BLD AUTO: 8.8 10^3/UL (ref 4.8–10.8)

## 2017-04-20 RX ADMIN — SENNOSIDES SCH TAB: 8.6 TABLET, FILM COATED ORAL at 09:00

## 2017-04-20 RX ADMIN — SENNOSIDES SCH TAB: 8.6 TABLET, FILM COATED ORAL at 21:44

## 2017-04-20 RX ADMIN — ENOXAPARIN SODIUM SCH MG: 100 INJECTION SUBCUTANEOUS at 08:59

## 2017-04-20 RX ADMIN — VITAMIN D, TAB 1000IU (100/BT) SCH UNIT: 25 TAB at 08:57

## 2017-04-20 RX ADMIN — VALSARTAN SCH MG: 160 TABLET, FILM COATED ORAL at 08:57

## 2017-04-20 RX ADMIN — Medication SCH MLS/HR: at 15:16

## 2017-04-20 RX ADMIN — SENNOSIDES SCH TAB: 8.6 TABLET, FILM COATED ORAL at 08:57

## 2017-04-20 RX ADMIN — AZTREONAM SCH MLS/HR: 1 INJECTION, POWDER, LYOPHILIZED, FOR SOLUTION INTRAMUSCULAR; INTRAVENOUS at 14:12

## 2017-04-20 RX ADMIN — Medication SCH MLS/HR: at 23:08

## 2017-04-20 RX ADMIN — ACARBOSE SCH MG: 50 TABLET ORAL at 08:57

## 2017-04-20 RX ADMIN — AZTREONAM SCH MLS/HR: 1 INJECTION, POWDER, LYOPHILIZED, FOR SOLUTION INTRAMUSCULAR; INTRAVENOUS at 21:44

## 2017-04-20 RX ADMIN — LEVOTHYROXINE SODIUM SCH MCG: 50 TABLET ORAL at 06:13

## 2017-04-20 RX ADMIN — CALCIUM GLUCONATE SCH MLS/HR: 94 INJECTION, SOLUTION INTRAVENOUS at 00:18

## 2017-04-20 RX ADMIN — VITAMIN D, TAB 1000IU (100/BT) SCH UNIT: 25 TAB at 09:00

## 2017-04-20 RX ADMIN — CALCIUM GLUCONATE SCH MLS/HR: 94 INJECTION, SOLUTION INTRAVENOUS at 03:34

## 2017-04-20 RX ADMIN — CALCIUM GLUCONATE SCH MLS/HR: 94 INJECTION, SOLUTION INTRAVENOUS at 17:09

## 2017-04-20 RX ADMIN — Medication SCH MLS/HR: at 06:53

## 2017-04-20 RX ADMIN — AZTREONAM SCH MLS/HR: 1 INJECTION, POWDER, LYOPHILIZED, FOR SOLUTION INTRAMUSCULAR; INTRAVENOUS at 06:11

## 2017-04-20 RX ADMIN — PANTOPRAZOLE SODIUM SCH MG: 40 TABLET, DELAYED RELEASE ORAL at 06:13

## 2017-04-20 NOTE — PN
Date/Time of Note


Date/Time of Note


DATE: 4/20/17 


TIME: 20:46





Assessment/Plan


Lines/Catheters


IV Catheter Type (from [facility]):  Saline Lock


Riojas in Place (from [facility]):  No





Assessment/Plan


Chief Complaint/Hosp Course


1.  Abdominal pain, leukocytosis, and CT are suggestive of sigmoid 

diverticulitis.  Improving


-abx


-diet as tolerated


2.  Recent Cholangitis and ? Cholecystitis s/p abx, ercp, and stent


-GI eval to rule out stent as source 


3.  Leukocytosis secondary to above.  Continue antibiotics and supportive care 

and treatment as above.  Improved.


4.  Hypertension.  Continue nutrition and medication optimization.


5.  Prediabetes.  Continue nutrition and medication optimization.


6.  Rheumatoid arthritis, on methotrexate and steroids shots.  Continue medical 

optimization.


7.  Hypothyroidism.  Continue Synthroid.


8.  Hyperlipidemia.  Continue diet and medication optimization.


9.  History of gastritis.  Continue proton pump inhibitor and encourage 

nutritional and lifestyle optimization.


10.  Anemia without evidence of acute blood loss, continue monitoring.


11.  Electrolyte abnormalities.  Please correct with fluid and nutritional 

management.


 


Thank you,


Problems:  





Subjective


24 Hr Interval Summary


No f/c.  No n/v.  No cp/sob.  Pain has improved.  No cough.  No ha/dizzy/visual 

or neurological changes.  Leukocytosis improved.





Exam/Review of Systems


Vital Signs


Vitals





 Vital Signs








  Date Time  Temp Pulse Resp B/P Pulse Ox O2 Delivery O2 Flow Rate FiO2


 


4/20/17 20:25 98.4 72 16 164/71 98   


 


4/18/17 23:00      Room Air  














 Intake and Output   


 


 4/19/17 4/19/17 4/20/17





 15:00 23:00 07:00


 


Intake Total 300 ml 2450 ml 1140 ml


 


Output Total  1150 ml 2350 ml


 


Balance 300 ml 1300 ml -1210 ml











Exam


Free Text/Dictation


GENERAL:  No acute distress, however, uncomfortable.


HEENT:  Pupils equal, reactive.  No scleral icterus.  Mucous membranes are 

moist.


NECK:  Supple, no JVD, no lymphadenopathy.  


PULMONARY:  Normal respiratory effort. No wheezing.


HEART:  S1, S2 present and regular.


ABDOMEN:  Soft, very min tenderness in left lower quadrant without rebound, nor 

guarding,  nor rigidity.


EXTREMITIES:  No edema.


VASCULAR:  Cap refill less than 2 seconds.


NEUROLOGIC:  Alert, oriented, moves all 4 extremities grossly.





Results


Result Diagram:  


4/20/17 0510                                                                   

             4/20/17 0510














LIANET GROVER MD Apr 20, 2017 20:46

## 2017-04-20 NOTE — PN
Date/Time of Note


Date/Time of Note


DATE: 4/20/17 


TIME: 10:38





Assessment/Plan


VTE Prophylaxis


VTE Prophylaxis Intervention:  SCD's





Lines/Catheters


IV Catheter Type (from [facility]):  Peripheral IV


Urinary Cath still in place:  No





Assessment/Plan


Assessment/Plan


-Hypokalemia-replete potassium, a.m. labs


- Acute diverticulitis-denies any abdominal pain at present.  Tolerates liquid 

diet will advance to soft diet.


   - Continue aztreonam and Flagyl.


- History of recent acute cholecystitis, although gallbladder ultrasound was 

negative for stone, status post ERCP and stenting.


- Hypertension.  Continue Diovan.


-  Rheumatoid arthritis.


- Hypothyroidism.  Continue levothyroxine.


-  Dyslipidemia.


Plan of care discussed with daughter [place].  Total time spent with patient 

regarding discussing plan of care with daughter, patient, staff =30 minutes.


Further recommendations based on clinical course.  Plan of care discussed with 

Dr. Brooks.





Subjective


24 Hr Interval Summary


Eyes:  no complaints


ENT:  no complaints


Respiratory:  no complaints


Cardiovascular:  no complaints


Gastrointestinal:  no complaints, other (Complain of abdominal pain at times, 

none at present.  Tolerating clear liquid diet denies any nausea vomiting)


Genitourinary:  no complaints


Musculoskeletal:  no complaints


Skin:  no complaints


Neurologic:  no complaints





Exam/Review of Systems


Vital Signs


Vitals





 Vital Signs








  Date Time  Temp Pulse Resp B/P Pulse Ox O2 Delivery O2 Flow Rate FiO2


 


4/20/17 07:45 98.4 68 20 139/65 99   


 


4/18/17 23:00      Room Air  














 Intake and Output   


 


 4/19/17 4/19/17 4/20/17





 14:59 22:59 06:59


 


Intake Total 300 ml 2450 ml 1140 ml


 


Output Total  1150 ml 2350 ml


 


Balance 300 ml 1300 ml -1210 ml











Exam


Constitutional:  alert, oriented, well developed


Psych:  nl mood/affect


Head:  atraumatic


Eyes:  EOMI, PERRL, nl sclera


ENMT:  nl external ears & nose


Neck:  non-tender


Respiratory:  clear to auscultation


Cardiovascular:  nl pulses


Gastrointestinal:  non-tender, other, soft


Musculoskeletal:  nl extremities to inspection


Extremities:  normal pulses


Neurological:  nl mental status, nl speech


Skin:  nl turgor


Lymph:  nontender





Results


Result Diagram:  


4/20/17 0510                                                                   

             4/20/17 0510





Results 24 hrs





Laboratory Tests








Test


  4/19/17


12:18 4/19/17


17:02 4/20/17


05:10 4/20/17


08:08


 


Bedside Glucose 114   100    101  


 


White Blood Count   8.8  # 


 


Red Blood Count   3.37  L 


 


Hemoglobin   8.8  L 


 


Hematocrit   28.1  L 


 


Mean Corpuscular Volume   83.4   


 


Mean Corpuscular Hemoglobin   26.1  L 


 


Mean Corpuscular Hemoglobin


Concent 


  


  31.3  L


  


 


 


Red Cell Distribution Width   17.2  H 


 


Platelet Count   586  H 


 


Mean Platelet Volume   9.3   


 


Neutrophils %   69.8   


 


Lymphocytes %   19.4   


 


Monocytes %   6.9   


 


Eosinophils %   2.2   


 


Basophils %   0.7   


 


Nucleated Red Blood Cells %   0.0   


 


Neutrophils #   6.2   


 


Lymphocytes #   1.7   


 


Monocytes #   0.6   


 


Eosinophils #   0.2   


 


Basophils #   0.1   


 


Nucleated Red Blood Cells #   0.0   


 


Sodium Level   138   


 


Potassium Level   3.4  L 


 


Chloride Level   107   


 


Carbon Dioxide Level   23   


 


Anion Gap   11   


 


Blood Urea Nitrogen   2  L 


 


Creatinine   0.41  L 


 


Glucose Level   103   


 


Calcium Level   8.5   











Medications


Medications





 Current Medications


Hydralazine HCl 10 mg 10 mg Q6H  PRN IV SBP ABOVE 170;  Start 4/19/17 at 00:30


Aztreonam 50 ml @  100 mls/hr Q8 IVPB  Last administered on 4/20/17at 06:11; 

Admin Dose 100 MLS/HR;  Start 4/19/17 at 00:30


Metronidazole (Flagyl 500 Mg (Pmx)) 100 ml @  100 mls/hr Q8 IVPB  Last 

administered on 4/20/17at 06:53; Admin Dose 100 MLS/HR;  Start 4/19/17 at 00:30


Acetaminophen (Tylenol Tab) 500 mg Q4H  PRN PO PAIN AND OR ELEVATED TEMP;  

Start 4/19/17 at 00:30


Morphine Sulfate (morphine) 2 mg Q4H  PRN IV PAIN;  Start 4/19/17 at 00:30


Enoxaparin Sodium 40 mg 40 mg DAILY SC ;  Start 4/19/17 at 09:00


Potassium Chloride/Sodium Chloride (KCl/1/2 NS) 1,015 ml @  75 mls/hr [id number] 

IV  Last administered on 4/20/17at 00:18; Admin Dose 75 MLS/HR;  Start 4/19/17 

at 00:30


Senna (Senokot) 2 tab BID PO  Last administered on 4/19/17at 08:56; Admin Dose 

2 TAB;  Start 4/19/17 at 09:00


Cholecalciferol (Vitamin D) 5,000 unit DAILY PO  Last administered on 4/19/17at 

08:56; Admin Dose 5,000 UNIT;  Start 4/19/17 at 09:00


Valsartan (Diovan) 160 mg DAILY PO  Last administered on 4/20/17at 08:57; Admin 

Dose 160 MG;  Start 4/19/17 at 09:00


Pantoprazole (Protonix Tab) 40 mg DAILY@06 PO  Last administered on 4/20/17at 06

:13; Admin Dose 40 MG;  Start 4/19/17 at 06:00


Ondansetron HCl (Zofran Inj) 4 mg Q4H  PRN IV NAUSEA AND/OR VOMITING;  Start 4/ 19/17 at 00:30


Miscellaneous Information 1 ea NOTE XX ;  Start 4/19/17 at 03:45


Glucose (Glutose) 15 gm Q15M  PRN PO DECREASED GLUCOSE;  Start 4/19/17 at 03:45


Glucose (Glutose) 22.5 gm Q15M  PRN PO DECREASED GLUCOSE;  Start 4/19/17 at 03:

45


Dextrose (D50w Syringe) 25 ml Q15M  PRN IV DECREASED GLUCOSE;  Start 4/19/17 at 

03:45


Dextrose (D50w Syringe) 50 ml Q15M  PRN IV DECREASED GLUCOSE;  Start 4/19/17 at 

03:45


Glucagon (Glucagen) 1 mg Q15M  PRN IM DECREASED GLUCOSE;  Start 4/19/17 at 03:45


Glucose (Glutose) 15 gm Q15M  PRN BUCCAL DECREASED GLUCOSE;  Start 4/19/17 at 03

:45











CRISPIN RAMIREZ Apr 20, 2017 10:42

## 2017-04-21 VITALS — RESPIRATION RATE: 18 BRPM | DIASTOLIC BLOOD PRESSURE: 66 MMHG | SYSTOLIC BLOOD PRESSURE: 141 MMHG

## 2017-04-21 VITALS — RESPIRATION RATE: 18 BRPM | DIASTOLIC BLOOD PRESSURE: 65 MMHG | SYSTOLIC BLOOD PRESSURE: 136 MMHG

## 2017-04-21 LAB
ADD SCAN DIFF: NO
ANION GAP SERPL CALC-SCNC: 7 MMOL/L (ref 8–16)
BASOPHILS # BLD AUTO: 0.1 10^3/UL (ref 0–0.1)
BASOPHILS NFR BLD: 1.4 % (ref 0–2)
BUN SERPL-MCNC: 6 MG/DL (ref 7–20)
CALCIUM SERPL-MCNC: 9 MG/DL (ref 8.4–10.2)
CHLORIDE SERPL-SCNC: 108 MMOL/L (ref 97–110)
CO2 SERPL-SCNC: 25 MMOL/L (ref 21–31)
CREAT SERPL-MCNC: 0.45 MG/DL (ref 0.44–1)
EOSINOPHIL # BLD: 0.2 10^3/UL (ref 0–0.5)
EOSINOPHIL NFR BLD: 2.7 % (ref 0–7)
ERYTHROCYTE [DISTWIDTH] IN BLOOD BY AUTOMATED COUNT: 17.2 % (ref 11.5–14.5)
GLUCOSE SERPL-MCNC: 105 MG/DL (ref 70–220)
HCT VFR BLD CALC: 28.4 % (ref 37–47)
HGB BLD-MCNC: 8.9 G/DL (ref 12–16)
LYMPHOCYTES # BLD AUTO: 2 10^3/UL (ref 0.8–2.9)
LYMPHOCYTES NFR BLD AUTO: 27.8 % (ref 15–51)
MCH RBC QN AUTO: 26.6 PG (ref 29–33)
MCHC RBC AUTO-ENTMCNC: 31.3 G/DL (ref 32–37)
MCV RBC AUTO: 85 FL (ref 82–101)
MONOCYTES # BLD: 0.6 10^3/UL (ref 0.3–0.9)
MONOCYTES NFR BLD: 8.7 % (ref 0–11)
NEUTROPHILS # BLD: 4.3 10^3/UL (ref 1.6–7.5)
NEUTROPHILS NFR BLD AUTO: 58.2 % (ref 39–77)
NRBC # BLD MANUAL: 0 10^3/UL (ref 0–0)
NRBC BLD QL: 0 /100WBC (ref 0–0)
PLATELET # BLD: 572 10^3/UL (ref 140–415)
PMV BLD AUTO: 9.4 FL (ref 7.4–10.4)
POTASSIUM SERPL-SCNC: 3.8 MMOL/L (ref 3.5–5.1)
RBC # BLD AUTO: 3.34 10^6/UL (ref 4.2–5.4)
SODIUM SERPL-SCNC: 136 MMOL/L (ref 135–144)
TOTAL CELLS COUNTED %: 100
TOTAL CELLS COUNTED PERCENT: 100 %
WBC # BLD AUTO: 7.3 10^3/UL (ref 4.8–10.8)

## 2017-04-21 RX ADMIN — AZTREONAM SCH MLS/HR: 1 INJECTION, POWDER, LYOPHILIZED, FOR SOLUTION INTRAMUSCULAR; INTRAVENOUS at 06:19

## 2017-04-21 RX ADMIN — LEVOTHYROXINE SODIUM SCH MCG: 50 TABLET ORAL at 06:19

## 2017-04-21 RX ADMIN — AZTREONAM SCH MLS/HR: 1 INJECTION, POWDER, LYOPHILIZED, FOR SOLUTION INTRAMUSCULAR; INTRAVENOUS at 22:29

## 2017-04-21 RX ADMIN — ACARBOSE SCH MG: 50 TABLET ORAL at 09:07

## 2017-04-21 RX ADMIN — AZTREONAM SCH MLS/HR: 1 INJECTION, POWDER, LYOPHILIZED, FOR SOLUTION INTRAMUSCULAR; INTRAVENOUS at 13:32

## 2017-04-21 RX ADMIN — Medication SCH MLS/HR: at 06:19

## 2017-04-21 RX ADMIN — CALCIUM GLUCONATE SCH MLS/HR: 94 INJECTION, SOLUTION INTRAVENOUS at 12:37

## 2017-04-21 RX ADMIN — Medication SCH MLS/HR: at 23:08

## 2017-04-21 RX ADMIN — VITAMIN D, TAB 1000IU (100/BT) SCH UNIT: 25 TAB at 09:05

## 2017-04-21 RX ADMIN — VALSARTAN SCH MG: 160 TABLET, FILM COATED ORAL at 09:04

## 2017-04-21 RX ADMIN — SENNOSIDES SCH TAB: 8.6 TABLET, FILM COATED ORAL at 09:04

## 2017-04-21 RX ADMIN — SENNOSIDES SCH TAB: 8.6 TABLET, FILM COATED ORAL at 20:52

## 2017-04-21 RX ADMIN — DOXYCYCLINE HYCLATE SCH MG: 100 TABLET, FILM COATED ORAL at 20:54

## 2017-04-21 RX ADMIN — CALCIUM GLUCONATE SCH MLS/HR: 94 INJECTION, SOLUTION INTRAVENOUS at 20:10

## 2017-04-21 RX ADMIN — CALCIUM GLUCONATE SCH MLS/HR: 94 INJECTION, SOLUTION INTRAVENOUS at 06:19

## 2017-04-21 RX ADMIN — ENOXAPARIN SODIUM SCH MG: 100 INJECTION SUBCUTANEOUS at 09:00

## 2017-04-21 RX ADMIN — PANTOPRAZOLE SODIUM SCH MG: 40 TABLET, DELAYED RELEASE ORAL at 06:19

## 2017-04-21 RX ADMIN — Medication SCH MLS/HR: at 14:25

## 2017-04-21 NOTE — PN
Date/Time of Note


Date/Time of Note


DATE: 4/21/17 


TIME: 10:49





Assessment/Plan


Lines/Catheters


IV Catheter Type (from [facility]):  Saline Lock


Riojas in Place (from [facility]):  No





Assessment/Plan


Chief Complaint/Hosp Course


1.  Abdominal pain, leukocytosis, and CT are suggestive of sigmoid 

diverticulitis.  Improved


-abx


-diet as tolerated


2.  Recent Cholangitis and ? Cholecystitis s/p abx, ercp, and stent


-GI f/u


3.  Leukocytosis secondary to above.  Continue antibiotics and supportive care 

and treatment as above.  Improved.


4.  Hypertension.  Continue nutrition and medication optimization.


5.  Prediabetes.  Continue nutrition and medication optimization.


6.  Rheumatoid arthritis, on methotrexate and steroids shots.  Continue medical 

optimization.


7.  Hypothyroidism.  Continue Synthroid.


8.  Hyperlipidemia.  Continue diet and medication optimization.


9.  History of gastritis.  Continue proton pump inhibitor and encourage 

nutritional and lifestyle optimization.


10.  Anemia without evidence of acute blood loss, continue monitoring.


11.  Electrolyte abnormalities.  Please correct with fluid and nutritional 

management.


 


Thank you,


Problems:  





Subjective


24 Hr Interval Summary


No f/c.  No n/v.  No cp/sob.  Pain has improved.  No cough.  No ha/dizzy/visual 

or neurological changes.  Leukocytosis resolved.





Exam/Review of Systems


Vital Signs


Vitals





 Vital Signs








  Date Time  Temp Pulse Resp B/P Pulse Ox O2 Delivery O2 Flow Rate FiO2


 


4/21/17 08:11 97.9 64 18 136/65 98   


 


4/18/17 23:00      Room Air  














 Intake and Output   


 


 4/20/17 4/20/17 4/21/17





 15:00 23:00 07:00


 


Intake Total 50 ml 1650 ml 1125 ml


 


Balance 50 ml 1650 ml 1125 ml











Exam


Free Text/Dictation


GENERAL:  No acute distress, however, uncomfortable.


HEENT:  Pupils equal, reactive.  No scleral icterus.  Mucous membranes are 

moist.


NECK:  Supple, no JVD, no lymphadenopathy.  


PULMONARY:  Normal respiratory effort. No wheezing.


HEART:  S1, S2 present and regular.


ABDOMEN:  Soft, very min tenderness in left lower quadrant without rebound, nor 

guarding,  nor rigidity.


EXTREMITIES:  No edema.


VASCULAR:  Cap refill less than 2 seconds.


NEUROLOGIC:  Alert, oriented, moves all 4 extremities grossly.





Results


Result Diagram:  


4/20/17 0510                                                                   

             4/21/17 0501














LIANET GROVER MD Apr 21, 2017 10:50

## 2017-04-21 NOTE — PN
Date/Time of Note


Date/Time of Note


DATE: 4/21/17 


TIME: 17:07





Assessment/Plan


VTE Prophylaxis


VTE Prophylaxis Intervention:  SCD's





Lines/Catheters


IV Catheter Type (from [facility]):  Peripheral IV


Urinary Cath still in place:  No





Assessment/Plan


Chief Complaint/Hosp Course


1.  Acute diverticulitis.  Continue aztreonam and Flagyl.


2.  History of recent acute cholecystitis, although gallbladder ultrasound was 

negative for stone, status post ERCP and stenting.


3.  Hypertension.  Continue Diovan.


4.  Rheumatoid arthritis.


5.  Hypothyroidism.  Continue levothyroxine.


6.  Dyslipidemia.


7. VRE urine, s/p Fosfomycin.


Patient's condition and plan of care discussed in details with patient and 

patient's daughter at the bedside.


Further recommendations based on clinical course.  Plan of care discussed with 

Dr. Brooks.


Problems:  





Subjective


24 Hr Interval Summary


Free Text/Dictation


Patient tolerates diet well, continues to have left lower quadrant pain however 

stated it decreased to compare to admission.





Exam/Review of Systems


Vital Signs


Vitals





 Vital Signs








  Date Time  Temp Pulse Resp B/P Pulse Ox O2 Delivery O2 Flow Rate FiO2


 


4/21/17 08:11 97.9 64 18 136/65 98   


 


4/18/17 23:00      Room Air  














 Intake and Output   


 


 4/20/17 4/20/17 4/21/17





 15:00 23:00 07:00


 


Intake Total 50 ml 1650 ml 1125 ml


 


Balance 50 ml 1650 ml 1125 ml











Exam


Constitutional:  alert, well developed


Psych:  no complaints


Head:  atraumatic, normocephalic


Eyes:  nl conjunctiva


ENMT:  nl external ears & nose


Neck:  non-tender, supple


Respiratory:  clear to auscultation, normal air movement


Cardiovascular:  nl pulses, regular rate and rhythm


Gastrointestinal:  soft, tender


Musculoskeletal:  nl extremities to inspection


Extremities:  normal pulses


Neurological:  CNS II-XII intact





Results


Result Diagram:  


4/21/17 0501                                                                   

             4/21/17 0501





Results 24 hrs





Laboratory Tests








Test


  4/21/17


05:01 4/21/17


08:14 4/21/17


11:47


 


White Blood Count 7.3    


 


Red Blood Count 3.34  L  


 


Hemoglobin 8.9  L  


 


Hematocrit 28.4  L  


 


Mean Corpuscular Volume 85.0    


 


Mean Corpuscular Hemoglobin 26.6  L  


 


Mean Corpuscular Hemoglobin


Concent 31.3  L


  


  


 


 


Red Cell Distribution Width 17.2  H  


 


Platelet Count 572  H  


 


Mean Platelet Volume 9.4    


 


Neutrophils % 58.2    


 


Lymphocytes % 27.8    


 


Monocytes % 8.7    


 


Eosinophils % 2.7    


 


Basophils % 1.4    


 


Nucleated Red Blood Cells % 0.0    


 


Neutrophils # 4.3    


 


Lymphocytes # 2.0    


 


Monocytes # 0.6    


 


Eosinophils # 0.2    


 


Basophils # 0.1    


 


Nucleated Red Blood Cells # 0.0    


 


Sodium Level 136    


 


Potassium Level 3.8    


 


Chloride Level 108    


 


Carbon Dioxide Level 25    


 


Anion Gap 7  L  


 


Blood Urea Nitrogen 6  L  


 


Creatinine 0.45    


 


Glucose Level 105    


 


Calcium Level 9.0    


 


Bedside Glucose  107   100  











Medications


Medications





 Current Medications


Hydralazine HCl 10 mg 10 mg Q6H  PRN IV SBP ABOVE 170;  Start 4/19/17 at 00:30


Aztreonam 50 ml @  100 mls/hr Q8 IVPB  Last administered on 4/21/17at 13:32; 

Admin Dose 100 MLS/HR;  Start 4/19/17 at 00:30


Metronidazole (Flagyl 500 Mg (Pmx)) 100 ml @  100 mls/hr Q8 IVPB  Last 

administered on 4/21/17at 14:25; Admin Dose 100 MLS/HR;  Start 4/19/17 at 00:30


Acetaminophen (Tylenol Tab) 500 mg Q4H  PRN PO PAIN AND OR ELEVATED TEMP;  

Start 4/19/17 at 00:30


Morphine Sulfate (morphine) 2 mg Q4H  PRN IV PAIN;  Start 4/19/17 at 00:30


Enoxaparin Sodium 40 mg 40 mg DAILY SC ;  Start 4/19/17 at 09:00


Potassium Chloride/Sodium Chloride (KCl/1/2 NS) 1,015 ml @  75 mls/hr [id number] 

IV  Last administered on 4/21/17at 12:37; Admin Dose 75 MLS/HR;  Start 4/19/17 

at 00:30


Senna (Senokot) 2 tab BID PO  Last administered on 4/21/17at 09:04; Admin Dose 

2 TAB;  Start 4/19/17 at 09:00


Cholecalciferol (Vitamin D) 5,000 unit DAILY PO  Last administered on 4/21/17at 

09:05; Admin Dose 5,000 UNIT;  Start 4/19/17 at 09:00


Valsartan (Diovan) 160 mg DAILY PO  Last administered on 4/21/17at 09:04; Admin 

Dose 160 MG;  Start 4/19/17 at 09:00


Pantoprazole (Protonix Tab) 40 mg DAILY@06 PO  Last administered on 4/21/17at 06

:19; Admin Dose 40 MG;  Start 4/19/17 at 06:00


Ondansetron HCl (Zofran Inj) 4 mg Q4H  PRN IV NAUSEA AND/OR VOMITING;  Start 4/ 19/17 at 00:30


Miscellaneous Information 1 ea NOTE XX ;  Start 4/19/17 at 03:45


Glucose (Glutose) 15 gm Q15M  PRN PO DECREASED GLUCOSE;  Start 4/19/17 at 03:45


Glucose (Glutose) 22.5 gm Q15M  PRN PO DECREASED GLUCOSE;  Start 4/19/17 at 03:

45


Dextrose (D50w Syringe) 25 ml Q15M  PRN IV DECREASED GLUCOSE;  Start 4/19/17 at 

03:45


Dextrose (D50w Syringe) 50 ml Q15M  PRN IV DECREASED GLUCOSE;  Start 4/19/17 at 

03:45


Glucagon (Glucagen) 1 mg Q15M  PRN IM DECREASED GLUCOSE;  Start 4/19/17 at 03:45


Glucose (Glutose) 15 gm Q15M  PRN BUCCAL DECREASED GLUCOSE;  Start 4/19/17 at 03

:45


Doxycycline Hyclate (Vibramycin) 100 mg BID PO ;  Start 4/21/17 at 21:00











RADHA DE JESUS Apr 21, 2017 17:09

## 2017-04-22 VITALS — HEART RATE: 62 BPM | RESPIRATION RATE: 18 BRPM | DIASTOLIC BLOOD PRESSURE: 67 MMHG | SYSTOLIC BLOOD PRESSURE: 151 MMHG

## 2017-04-22 VITALS — RESPIRATION RATE: 20 BRPM | SYSTOLIC BLOOD PRESSURE: 153 MMHG | DIASTOLIC BLOOD PRESSURE: 67 MMHG

## 2017-04-22 LAB
ADD SCAN DIFF: NO
ANION GAP SERPL CALC-SCNC: 12 MMOL/L (ref 8–16)
BASOPHILS # BLD AUTO: 0.1 10^3/UL (ref 0–0.1)
BASOPHILS NFR BLD: 1.1 % (ref 0–2)
BUN SERPL-MCNC: 6 MG/DL (ref 7–20)
CALCIUM SERPL-MCNC: 9.4 MG/DL (ref 8.4–10.2)
CHLORIDE SERPL-SCNC: 106 MMOL/L (ref 97–110)
CO2 SERPL-SCNC: 24 MMOL/L (ref 21–31)
CREAT SERPL-MCNC: 0.44 MG/DL (ref 0.44–1)
EOSINOPHIL # BLD: 0.2 10^3/UL (ref 0–0.5)
EOSINOPHIL NFR BLD: 2 % (ref 0–7)
ERYTHROCYTE [DISTWIDTH] IN BLOOD BY AUTOMATED COUNT: 17.4 % (ref 11.5–14.5)
GLUCOSE SERPL-MCNC: 98 MG/DL (ref 70–220)
HCT VFR BLD CALC: 30.8 % (ref 37–47)
HGB BLD-MCNC: 9.6 G/DL (ref 12–16)
LYMPHOCYTES # BLD AUTO: 2.1 10^3/UL (ref 0.8–2.9)
LYMPHOCYTES NFR BLD AUTO: 24.7 % (ref 15–51)
MCH RBC QN AUTO: 26.3 PG (ref 29–33)
MCHC RBC AUTO-ENTMCNC: 31.2 G/DL (ref 32–37)
MCV RBC AUTO: 84.4 FL (ref 82–101)
MONOCYTES # BLD: 0.8 10^3/UL (ref 0.3–0.9)
MONOCYTES NFR BLD: 8.9 % (ref 0–11)
NEUTROPHILS # BLD: 5.2 10^3/UL (ref 1.6–7.5)
NEUTROPHILS NFR BLD AUTO: 61.1 % (ref 39–77)
NRBC # BLD MANUAL: 0 10^3/UL (ref 0–0)
NRBC BLD QL: 0 /100WBC (ref 0–0)
PLATELET # BLD: 547 10^3/UL (ref 140–415)
PMV BLD AUTO: 9.2 FL (ref 7.4–10.4)
POTASSIUM SERPL-SCNC: 3.6 MMOL/L (ref 3.5–5.1)
RBC # BLD AUTO: 3.65 10^6/UL (ref 4.2–5.4)
SODIUM SERPL-SCNC: 138 MMOL/L (ref 135–144)
WBC # BLD AUTO: 8.6 10^3/UL (ref 4.8–10.8)

## 2017-04-22 RX ADMIN — AZTREONAM SCH MLS/HR: 1 INJECTION, POWDER, LYOPHILIZED, FOR SOLUTION INTRAMUSCULAR; INTRAVENOUS at 21:30

## 2017-04-22 RX ADMIN — LEVOTHYROXINE SODIUM SCH MCG: 50 TABLET ORAL at 06:52

## 2017-04-22 RX ADMIN — Medication SCH MLS/HR: at 13:32

## 2017-04-22 RX ADMIN — CALCIUM GLUCONATE SCH MLS/HR: 94 INJECTION, SOLUTION INTRAVENOUS at 09:42

## 2017-04-22 RX ADMIN — SENNOSIDES SCH TAB: 8.6 TABLET, FILM COATED ORAL at 08:41

## 2017-04-22 RX ADMIN — DOXYCYCLINE HYCLATE SCH MG: 100 TABLET, FILM COATED ORAL at 20:37

## 2017-04-22 RX ADMIN — SENNOSIDES SCH TAB: 8.6 TABLET, FILM COATED ORAL at 20:37

## 2017-04-22 RX ADMIN — Medication SCH MLS/HR: at 06:55

## 2017-04-22 RX ADMIN — AZTREONAM SCH MLS/HR: 1 INJECTION, POWDER, LYOPHILIZED, FOR SOLUTION INTRAMUSCULAR; INTRAVENOUS at 06:53

## 2017-04-22 RX ADMIN — ACARBOSE SCH MG: 50 TABLET ORAL at 08:43

## 2017-04-22 RX ADMIN — DOXYCYCLINE HYCLATE SCH MG: 100 TABLET, FILM COATED ORAL at 08:41

## 2017-04-22 RX ADMIN — Medication SCH MLS/HR: at 22:46

## 2017-04-22 RX ADMIN — CALCIUM GLUCONATE SCH MLS/HR: 94 INJECTION, SOLUTION INTRAVENOUS at 03:27

## 2017-04-22 RX ADMIN — VITAMIN D, TAB 1000IU (100/BT) SCH UNIT: 25 TAB at 08:42

## 2017-04-22 RX ADMIN — PANTOPRAZOLE SODIUM SCH MG: 40 TABLET, DELAYED RELEASE ORAL at 06:52

## 2017-04-22 RX ADMIN — VALSARTAN SCH MG: 160 TABLET, FILM COATED ORAL at 08:43

## 2017-04-22 RX ADMIN — ENOXAPARIN SODIUM SCH MG: 100 INJECTION SUBCUTANEOUS at 08:50

## 2017-04-22 RX ADMIN — AZTREONAM SCH MLS/HR: 1 INJECTION, POWDER, LYOPHILIZED, FOR SOLUTION INTRAMUSCULAR; INTRAVENOUS at 13:32

## 2017-04-22 RX ADMIN — CALCIUM GLUCONATE SCH MLS/HR: 94 INJECTION, SOLUTION INTRAVENOUS at 21:29

## 2017-04-22 NOTE — PN
Date/Time of Note


Date/Time of Note


DATE: 4/22/17 


TIME: 12:34





Assessment/Plan


VTE Prophylaxis


VTE Prophylaxis Intervention:  other





Lines/Catheters


IV Catheter Type (from [facility]):  Peripheral IV


Urinary Cath still in place:  No





Assessment/Plan


Assessment/Plan


1.  Acute diverticulitis.  Continue aztreonam and Flagyl.


2.  History of recent acute cholecystitis, although gallbladder ultrasound was 

negative for stone, status post ERCP and stenting.


3.  Hypertension.  Continue Diovan.


4.  Rheumatoid arthritis.


5.  Hypothyroidism.  Continue levothyroxine.


6.  Dyslipidemia.


7. VRE urine, s/p Fosfomycin.








Further recommendations based on clinical course.  Plan of care discussed with 

Dr. Brooks.





Subjective


24 Hr Interval Summary


Eyes:  no complaints


ENT:  no complaints


Respiratory:  no complaints


Cardiovascular:  no complaints


Gastrointestinal:  no complaints


Genitourinary:  no complaints


Musculoskeletal:  no complaints


Skin:  no complaints


Neurologic:  no complaints


Endocrine:  no complaints


Lymphatic:  no complaints


Psychological:  no complaints


Immunologic:  no complaints





Exam/Review of Systems


Vital Signs


Vitals





 Vital Signs








  Date Time  Temp Pulse Resp B/P Pulse Ox O2 Delivery O2 Flow Rate FiO2


 


4/22/17 08:01 98.7 62 18 151/67 97 Room Air  














 Intake and Output   


 


 4/21/17 4/21/17 4/22/17





 15:00 23:00 07:00


 


Intake Total 1200 ml 1070 ml 1475 ml


 


Balance 1200 ml 1070 ml 1475 ml











Exam


Constitutional:  alert, oriented, well developed


Psych:  nl mood/affect


Head:  atraumatic


Eyes:  EOMI


ENMT:  nl external ears & nose


Neck:  non-tender


Respiratory:  clear to auscultation


Cardiovascular:  nl pulses


Gastrointestinal:  soft, tender (mild tenderness noted)


Extremities:  normal pulses


Neurological:  nl mental status, nl speech


Skin:  nl turgor


Lymph:  nontender





Results


Result Diagram:  


4/22/17 0507                                                                   

             4/22/17 0507





Results 24 hrs





Laboratory Tests








Test


  4/21/17


17:00 4/22/17


05:07 4/22/17


07:59 4/22/17


12:21


 


Bedside Glucose 108    98   78  


 


White Blood Count  8.6    


 


Red Blood Count  3.65  L  


 


Hemoglobin  9.6  L  


 


Hematocrit  30.8  L  


 


Mean Corpuscular Volume  84.4    


 


Mean Corpuscular Hemoglobin  26.3  L  


 


Mean Corpuscular Hemoglobin


Concent 


  31.2  L


  


  


 


 


Red Cell Distribution Width  17.4  H  


 


Platelet Count  547  H  


 


Mean Platelet Volume  9.2    


 


Neutrophils %  61.1    


 


Lymphocytes %  24.7    


 


Monocytes %  8.9    


 


Eosinophils %  2.0    


 


Basophils %  1.1    


 


Nucleated Red Blood Cells %  0.0    


 


Neutrophils #  5.2    


 


Lymphocytes #  2.1    


 


Monocytes #  0.8    


 


Eosinophils #  0.2    


 


Basophils #  0.1    


 


Nucleated Red Blood Cells #  0.0    


 


Sodium Level  138    


 


Potassium Level  3.6    


 


Chloride Level  106    


 


Carbon Dioxide Level  24    


 


Anion Gap  12    


 


Blood Urea Nitrogen  6  L  


 


Creatinine  0.44    


 


Glucose Level  98    


 


Calcium Level  9.4    











Medications


Medications





 Current Medications


Hydralazine HCl 10 mg 10 mg Q6H  PRN IV SBP ABOVE 170;  Start 4/19/17 at 00:30


Aztreonam 50 ml @  100 mls/hr Q8 IVPB  Last administered on 4/22/17at 06:53; 

Admin Dose 100 MLS/HR;  Start 4/19/17 at 00:30


Metronidazole (Flagyl 500 Mg (Pmx)) 100 ml @  100 mls/hr Q8 IVPB  Last 

administered on 4/22/17at 06:55; Admin Dose 100 MLS/HR;  Start 4/19/17 at 00:30


Acetaminophen (Tylenol Tab) 500 mg Q4H  PRN PO PAIN AND OR ELEVATED TEMP;  

Start 4/19/17 at 00:30


Morphine Sulfate (morphine) 2 mg Q4H  PRN IV PAIN;  Start 4/19/17 at 00:30


Enoxaparin Sodium 40 mg 40 mg DAILY SC ;  Start 4/19/17 at 09:00


Potassium Chloride/Sodium Chloride (KCl/1/2 NS) 1,015 ml @  75 mls/hr [id number] 

IV  Last administered on 4/22/17at 03:27; Admin Dose 75 MLS/HR;  Start 4/19/17 

at 00:30


Senna (Senokot) 2 tab BID PO  Last administered on 4/22/17at 08:41; Admin Dose 

2 TAB;  Start 4/19/17 at 09:00


Cholecalciferol (Vitamin D) 5,000 unit DAILY PO  Last administered on 4/22/17at 

08:42; Admin Dose 5,000 UNIT;  Start 4/19/17 at 09:00


Valsartan (Diovan) 160 mg DAILY PO  Last administered on 4/22/17at 08:43; Admin 

Dose 160 MG;  Start 4/19/17 at 09:00


Pantoprazole (Protonix Tab) 40 mg DAILY@06 PO  Last administered on 4/22/17at 06

:52; Admin Dose 40 MG;  Start 4/19/17 at 06:00


Ondansetron HCl (Zofran Inj) 4 mg Q4H  PRN IV NAUSEA AND/OR VOMITING;  Start 4/ 19/17 at 00:30


Miscellaneous Information 1 ea NOTE XX ;  Start 4/19/17 at 03:45


Glucose (Glutose) 15 gm Q15M  PRN PO DECREASED GLUCOSE;  Start 4/19/17 at 03:45


Glucose (Glutose) 22.5 gm Q15M  PRN PO DECREASED GLUCOSE;  Start 4/19/17 at 03:

45


Dextrose (D50w Syringe) 25 ml Q15M  PRN IV DECREASED GLUCOSE;  Start 4/19/17 at 

03:45


Dextrose (D50w Syringe) 50 ml Q15M  PRN IV DECREASED GLUCOSE;  Start 4/19/17 at 

03:45


Glucagon (Glucagen) 1 mg Q15M  PRN IM DECREASED GLUCOSE;  Start 4/19/17 at 03:45


Glucose (Glutose) 15 gm Q15M  PRN BUCCAL DECREASED GLUCOSE;  Start 4/19/17 at 03

:45


Doxycycline Hyclate (Vibramycin) 100 mg BID PO  Last administered on 4/22/17at 

08:41; Admin Dose 100 MG;  Start 4/21/17 at 21:00











CRISPIN RAMIREZ Apr 22, 2017 12:35

## 2017-04-23 VITALS — DIASTOLIC BLOOD PRESSURE: 67 MMHG | SYSTOLIC BLOOD PRESSURE: 151 MMHG | RESPIRATION RATE: 19 BRPM

## 2017-04-23 LAB
ADD SCAN DIFF: NO
ANION GAP SERPL CALC-SCNC: 6 MMOL/L (ref 8–16)
BASOPHILS # BLD AUTO: 0.1 10^3/UL (ref 0–0.1)
BASOPHILS NFR BLD: 1.1 % (ref 0–2)
BUN SERPL-MCNC: 8 MG/DL (ref 7–20)
CALCIUM SERPL-MCNC: 9.4 MG/DL (ref 8.4–10.2)
CHLORIDE SERPL-SCNC: 107 MMOL/L (ref 97–110)
CO2 SERPL-SCNC: 26 MMOL/L (ref 21–31)
CREAT SERPL-MCNC: 0.44 MG/DL (ref 0.44–1)
EOSINOPHIL # BLD: 0.2 10^3/UL (ref 0–0.5)
EOSINOPHIL NFR BLD: 2.8 % (ref 0–7)
ERYTHROCYTE [DISTWIDTH] IN BLOOD BY AUTOMATED COUNT: 17.5 % (ref 11.5–14.5)
GLUCOSE SERPL-MCNC: 98 MG/DL (ref 70–220)
HCT VFR BLD CALC: 29.4 % (ref 37–47)
HGB BLD-MCNC: 9.3 G/DL (ref 12–16)
LYMPHOCYTES # BLD AUTO: 2.1 10^3/UL (ref 0.8–2.9)
LYMPHOCYTES NFR BLD AUTO: 24.5 % (ref 15–51)
MCH RBC QN AUTO: 26.2 PG (ref 29–33)
MCHC RBC AUTO-ENTMCNC: 31.6 G/DL (ref 32–37)
MCV RBC AUTO: 82.8 FL (ref 82–101)
MONOCYTES # BLD: 0.7 10^3/UL (ref 0.3–0.9)
MONOCYTES NFR BLD: 8.4 % (ref 0–11)
NEUTROPHILS # BLD: 5.2 10^3/UL (ref 1.6–7.5)
NEUTROPHILS NFR BLD AUTO: 61.3 % (ref 39–77)
NRBC # BLD MANUAL: 0 10^3/UL (ref 0–0)
NRBC BLD QL: 0 /100WBC (ref 0–0)
PLATELET # BLD: 596 10^3/UL (ref 140–415)
PMV BLD AUTO: 9.2 FL (ref 7.4–10.4)
POTASSIUM SERPL-SCNC: 3.9 MMOL/L (ref 3.5–5.1)
RBC # BLD AUTO: 3.55 10^6/UL (ref 4.2–5.4)
SODIUM SERPL-SCNC: 135 MMOL/L (ref 135–144)
WBC # BLD AUTO: 8.5 10^3/UL (ref 4.8–10.8)

## 2017-04-23 RX ADMIN — VITAMIN D, TAB 1000IU (100/BT) SCH UNIT: 25 TAB at 09:26

## 2017-04-23 RX ADMIN — DOXYCYCLINE HYCLATE SCH MG: 100 TABLET, FILM COATED ORAL at 09:26

## 2017-04-23 RX ADMIN — ACARBOSE SCH MG: 50 TABLET ORAL at 09:25

## 2017-04-23 RX ADMIN — SENNOSIDES SCH TAB: 8.6 TABLET, FILM COATED ORAL at 09:26

## 2017-04-23 RX ADMIN — ENOXAPARIN SODIUM SCH MG: 100 INJECTION SUBCUTANEOUS at 09:00

## 2017-04-23 RX ADMIN — Medication SCH MLS/HR: at 14:00

## 2017-04-23 RX ADMIN — AZTREONAM SCH MLS/HR: 1 INJECTION, POWDER, LYOPHILIZED, FOR SOLUTION INTRAMUSCULAR; INTRAVENOUS at 14:00

## 2017-04-23 RX ADMIN — LEVOTHYROXINE SODIUM SCH MCG: 50 TABLET ORAL at 06:12

## 2017-04-23 RX ADMIN — CALCIUM GLUCONATE SCH MLS/HR: 94 INJECTION, SOLUTION INTRAVENOUS at 12:46

## 2017-04-23 RX ADMIN — AZTREONAM SCH MLS/HR: 1 INJECTION, POWDER, LYOPHILIZED, FOR SOLUTION INTRAMUSCULAR; INTRAVENOUS at 05:46

## 2017-04-23 RX ADMIN — Medication SCH MLS/HR: at 05:46

## 2017-04-23 RX ADMIN — VALSARTAN SCH MG: 160 TABLET, FILM COATED ORAL at 09:26

## 2017-04-23 RX ADMIN — PANTOPRAZOLE SODIUM SCH MG: 40 TABLET, DELAYED RELEASE ORAL at 05:46

## 2017-04-23 NOTE — PDOCDIS
Discharge Instructions


CONDITION


Patient Condition:  Stable





HOME CARE INSTRUCTIONS:


Special Diet:  clear liquid





ACTIVITY:








Activity Restrictions:   Slowly Increase Activity





 Rest between Activity





 Avoid heavy lifting





Bathing Restrictions:  Sponge Bath





FOLLOW UP/APPOINTMENTS


Appointments


Follow with primary MD in 1 week


Follow-up with surgery as recommended


Call 911 or go to the nearest hospital if the symptoms get worse.  Patient 

verbalized understanding of discharge instructions discussed with staff, Dr. Brooks, patient.





SCHOOL/WORK RELEASE


May return to School/Work on:  Apr 27, 2017


School/Work Release Comment:  Date of admission- 4/18/2017. Date of Discharge- 4 /23/2017.











CRISPIN RAMIREZ Apr 23, 2017 13:21

## 2017-04-23 NOTE — DS
Date/Time of Note


Date/Time of Note


DATE: 4/23/17 


TIME: 13:22





Discharge Summary


Admission/Discharge Info


Admit Date/Time


Apr 18, 2017 at 18:59


Discharge Date/Time





Patient Condition:  Stable


Hospital Course


1.  Acute diverticulitis.  Continue aztreonam and Flagyl.


2.  History of recent acute cholecystitis, although gallbladder ultrasound was 

negative for stone, status post ERCP and stenting.


3.  Hypertension.  Continue Diovan.


4.  Rheumatoid arthritis.


5.  Hypothyroidism.  Continue levothyroxine.


6.  Dyslipidemia.


7. VRE urine, s/p Fosfomycin.


Patient's condition and plan of care discussed in details with patient and 

patient's daughter at the bedside.


Further recommendations based on clinical course.  Plan of care discussed with 

Dr. Brooks.


Home Meds


Active Scripts


Levofloxacin* (Levaquin*) 500 Mg Tablet, 500 MG PO DAILY for 7 Days, TAB


   Prov:CRISPIN RAMIREZ         4/12/17


Docusate Sodium (Dok) 100 Mg Capsule, 100 MG PO BID for 30 Days, CAP


   Prov:CRISPIN RAMIREZ         4/12/17


Reported Medications


Cholecalciferol* (Vitamin D3*) 1,000 Unit Tablet, 5000 UNIT PO DAILY, TAB


   4/7/17


Levothyroxine Sodium* (Levoxyl*) 50 Mcg Tablet, 50 MCG PO BEFORE BREAKFAST, #30 

TAB


   4/7/17


Acarbose* (Precose*) 50 Mg Tablet, 50 MG PO WITH BREAKFAST, TAB


   4/7/17


Valsartan* (Diovan*) 160 Mg Tablet, 160 MG PO DAILY, TAB


   12/6/16


Esomeprazole Mag Trihydrate (Nexium) 40 Mg Capsule.dr, 40 MG PO DAILY


   10/20/12


Discontinued Reported Medications


Methotrexate* (Methotrexate*) 2.5 Mg Tab, 7.5 MG PO BID, TAB


   4/7/17


Atorvastatin (Lipitor) 80 Mg Tablet, 80 MG PO DAILY


   10/20/12


Discontinued Scripts


Hydrocodone/Acetaminophen (Norco 5-325 Tablet) 1 Each Tablet, 1 EACH PO Q6 Y 

for PAIN LEVEL 7-10, #20 TAB


   Prov:CRISPIN RAMIREZ         4/12/17


Pantoprazole* (Pantoprazole*) 40 Mg Tablet., 40 MG PO DAILY@06 for 30 Days


   Prov:CRISPIN RAMIREZ         4/12/17


Levothyroxine Sodium* (Synthroid*) 50 Mcg Tablet, 50 MCG PO DAILY@06 for 30 Days

, TAB


   Prov:CRISPIN RAMIREZ         4/12/17


Pending Labs





Laboratory Tests








Test


  4/22/17


17:12 4/23/17


05:34 4/23/17


08:03 4/23/17


12:04


 


Bedside Glucose


  146mg/dL


() 


  103mg/dL


() 106mg/dL


()


 


White Blood Count


  


  8.510^3/ul


(4.8-10.8) 


  


 


 


Red Blood Count


  


  3.5510^6/ul


(4.20-5.40) 


  


 


 


Hemoglobin


  


  9.3g/dl


(12.0-16.0) 


  


 


 


Hematocrit


  


  29.4%


(37.0-47.0) 


  


 


 


Mean Corpuscular Volume


  


  82.8fl


(82.0-101.0) 


  


 


 


Mean Corpuscular Hemoglobin


  


  26.2pg


(29.0-33.0) 


  


 


 


Mean Corpuscular Hemoglobin


Concent 


  31.6g/dl


(32.0-37.0) 


  


 


 


Red Cell Distribution Width


  


  17.5%


(11.5-14.5) 


  


 


 


Platelet Count


  


  21291^3/UL


(140-415) 


  


 


 


Mean Platelet Volume


  


  9.2fl


(7.4-10.4) 


  


 


 


Neutrophils %


  


  61.3%


(39.0-77.0) 


  


 


 


Lymphocytes %


  


  24.5%


(15.0-51.0) 


  


 


 


Monocytes %


  


  8.4%


(0.0-11.0) 


  


 


 


Eosinophils %  2.8% (0.0-7.0)   


 


Basophils %  1.1% (0.0-2.0)   


 


Nucleated Red Blood Cells %


  


  0.0/100WBC


(0.0-0.0) 


  


 


 


Neutrophils #


  


  5.210^3/ul


(1.6-7.5) 


  


 


 


Lymphocytes #


  


  2.110^3/ul


(0.8-2.9) 


  


 


 


Monocytes #


  


  0.710^3/ul


(0.3-0.9) 


  


 


 


Eosinophils #


  


  0.210^3/ul


(0.0-0.5) 


  


 


 


Basophils #


  


  0.110^3/ul


(0.0-0.1) 


  


 


 


Nucleated Red Blood Cells #


  


  0.010^3/ul


(0.0-0.0) 


  


 


 


Sodium Level


  


  135mmol/L


(135-144) 


  


 


 


Potassium Level


  


  3.9mmol/L


(3.5-5.1) 


  


 


 


Chloride Level


  


  107mmol/L


() 


  


 


 


Carbon Dioxide Level


  


  26mmol/L


(21-31) 


  


 


 


Anion Gap  6 (8-16)   


 


Blood Urea Nitrogen  8mg/dl (7-20)   


 


Creatinine


  


  0.44mg/dl


(0.44-1.00) 


  


 


 


Glucose Level


  


  98mg/dl


() 


  


 


 


Calcium Level


  


  9.4mg/dl


(8.4-10.2) 


  


 

















CRISPIN RAMIREZ Apr 23, 2017 13:22

## 2017-04-24 NOTE — PN
Date/Time of Note


Date/Time of Note


DATE: 4/22/17 


TIME: 12:45





Assessment/Plan


Lines/Catheters


IV Catheter Type (from [facility]):  Saline Lock


Riojas in Place (from [facility]):  No





Assessment/Plan


Chief Complaint/Hosp Course


1.  Abdominal pain, leukocytosis, and CT are suggestive of sigmoid 

diverticulitis.  Improved


-abx


-diet as tolerated


2.  Recent Cholangitis and ? Cholecystitis s/p abx, ercp, and stent


-GI f/u


3.  Leukocytosis secondary to above.  Continue antibiotics and supportive care 

and treatment as above.  Improved.


4.  Hypertension.  Continue nutrition and medication optimization.


5.  Prediabetes.  Continue nutrition and medication optimization.


6.  Rheumatoid arthritis, on methotrexate and steroids shots.  Continue medical 

optimization.


7.  Hypothyroidism.  Continue Synthroid.


8.  Hyperlipidemia.  Continue diet and medication optimization.


9.  History of gastritis.  Continue proton pump inhibitor and encourage 

nutritional and lifestyle optimization.


10.  Anemia without evidence of acute blood loss, continue monitoring.


11.  Electrolyte abnormalities.  Please correct with fluid and nutritional 

management.


 


Thank you,





Late entry 4/22


Problems:  





Subjective


24 Hr Interval Summary


No f/c.  No n/v.  No cp/sob.  Pain has improved.  No cough.  No ha/dizzy/visual 

or neurological changes.  Leukocytosis resolved.





Exam/Review of Systems


Vital Signs


Vitals





 Vital Signs








  Date Time  Temp Pulse Resp B/P Pulse Ox O2 Delivery O2 Flow Rate FiO2


 


4/23/17 07:24 98.2 74 19 151/67 98   


 


4/22/17 08:01      Room Air  














 Intake and Output   


 


 4/23/17 4/23/17 4/24/17





 14:59 22:59 06:59


 


Intake Total 500 ml 1090 ml 


 


Balance 500 ml 1090 ml 











Exam


Free Text/Dictation


GENERAL:  No acute distress, however, uncomfortable.


HEENT:  Pupils equal, reactive.  No scleral icterus.  Mucous membranes are 

moist.


NECK:  Supple, no JVD, no lymphadenopathy.  


PULMONARY:  Normal respiratory effort. No wheezing.


HEART:  S1, S2 present and regular.


ABDOMEN:  Soft, very min tenderness in left lower quadrant without rebound, nor 

guarding,  nor rigidity.


EXTREMITIES:  No edema.


VASCULAR:  Cap refill less than 2 seconds.


NEUROLOGIC:  Alert, oriented, moves all 4 extremities grossly.





Results


Result Diagram:  


4/23/17 0534                                                                   

             4/23/17 0534














LIANET GROVER MD Apr 24, 2017 12:45

## 2017-04-24 NOTE — PN
Date/Time of Note


Date/Time of Note


DATE: 4/23/17 


TIME: 12:45





Assessment/Plan


Lines/Catheters


IV Catheter Type (from [facility]):  Saline Lock


Riojas in Place (from [facility]):  No





Assessment/Plan


Chief Complaint/Hosp Course


1.  Abdominal pain, leukocytosis, and CT are suggestive of sigmoid 

diverticulitis.  Improved s/p abx


-diet as tolerated


2.  Recent Cholangitis and ? Cholecystitis s/p abx, ercp, and stent


-GI f/u


3.  Leukocytosis secondary to above.  Continue antibiotics and supportive care 

and treatment as above.  Improved.


4.  Hypertension.  Continue nutrition and medication optimization.


5.  Prediabetes.  Continue nutrition and medication optimization.


6.  Rheumatoid arthritis, on methotrexate and steroids shots.  Continue medical 

optimization.


7.  Hypothyroidism.  Continue Synthroid.


8.  Hyperlipidemia.  Continue diet and medication optimization.


9.  History of gastritis.  Continue proton pump inhibitor and encourage 

nutritional and lifestyle optimization.


10.  Anemia without evidence of acute blood loss, continue monitoring.


11.  Electrolyte abnormalities.  Please correct with fluid and nutritional 

management.


 


Thank you,





Late entry 4/23


Problems:  





Subjective


24 Hr Interval Summary


No f/c.  No n/v.  No cp/sob.  Pain has improved.  No cough.  No ha/dizzy/visual 

or neurological changes.





Exam/Review of Systems


Vital Signs


Vitals





 Vital Signs








  Date Time  Temp Pulse Resp B/P Pulse Ox O2 Delivery O2 Flow Rate FiO2


 


4/23/17 07:24 98.2 74 19 151/67 98   


 


4/22/17 08:01      Room Air  














 Intake and Output   


 


 4/23/17 4/23/17 4/24/17





 14:59 22:59 06:59


 


Intake Total 500 ml 1090 ml 


 


Balance 500 ml 1090 ml 











Exam


Free Text/Dictation


GENERAL:  No acute distress, however, uncomfortable.


HEENT:  Pupils equal, reactive.  No scleral icterus.  Mucous membranes are 

moist.


NECK:  Supple, no JVD, no lymphadenopathy.  


PULMONARY:  Normal respiratory effort. No wheezing.


HEART:  S1, S2 present and regular.


ABDOMEN:  Soft,NT, no rebound, nor guarding,  nor rigidity.


EXTREMITIES:  No edema.


VASCULAR:  Cap refill less than 2 seconds.


NEUROLOGIC:  Alert, oriented, moves all 4 extremities grossly.





Results


Result Diagram:  


4/23/17 0534                                                                   

             4/23/17 0534














LIANET GROVER MD Apr 24, 2017 12:47

## 2017-08-22 ENCOUNTER — OFFICE (OUTPATIENT)
Dept: URBAN - METROPOLITAN AREA CLINIC 56 | Facility: CLINIC | Age: 81
End: 2017-08-22

## 2017-08-22 VITALS
HEIGHT: 62 IN | HEART RATE: 80 BPM | WEIGHT: 121 LBS | DIASTOLIC BLOOD PRESSURE: 80 MMHG | SYSTOLIC BLOOD PRESSURE: 130 MMHG

## 2017-08-22 DIAGNOSIS — K80.20 GALL STONES: ICD-10-CM

## 2017-08-22 PROCEDURE — 99202 OFFICE O/P NEW SF 15 MIN: CPT

## 2017-08-22 NOTE — SERVICEHPINOTES
LILA ANTHONY   returns today for follow-up from last visit on   .BRercp done cbd stones removed pus drained from cbd in 4/2017 at  BRdid not see dr tatum for lap satya

## 2017-11-16 ENCOUNTER — HOSPITAL ENCOUNTER (INPATIENT)
Dept: HOSPITAL 10 - E/R | Age: 82
LOS: 13 days | Discharge: HOME HEALTH | DRG: 863 | End: 2017-11-29
Attending: INTERNAL MEDICINE | Admitting: INTERNAL MEDICINE
Payer: MEDICARE

## 2017-11-16 VITALS
WEIGHT: 112.66 LBS | HEIGHT: 55 IN | BODY MASS INDEX: 26.07 KG/M2 | HEIGHT: 55 IN | WEIGHT: 112.66 LBS | BODY MASS INDEX: 26.07 KG/M2

## 2017-11-16 VITALS — SYSTOLIC BLOOD PRESSURE: 141 MMHG | RESPIRATION RATE: 19 BRPM | DIASTOLIC BLOOD PRESSURE: 63 MMHG

## 2017-11-16 VITALS — SYSTOLIC BLOOD PRESSURE: 151 MMHG | RESPIRATION RATE: 16 BRPM | HEART RATE: 77 BPM | DIASTOLIC BLOOD PRESSURE: 67 MMHG

## 2017-11-16 VITALS — TEMPERATURE: 98.3 F

## 2017-11-16 DIAGNOSIS — I10: ICD-10-CM

## 2017-11-16 DIAGNOSIS — B95.2: ICD-10-CM

## 2017-11-16 DIAGNOSIS — D64.9: ICD-10-CM

## 2017-11-16 DIAGNOSIS — E03.9: ICD-10-CM

## 2017-11-16 DIAGNOSIS — J90: ICD-10-CM

## 2017-11-16 DIAGNOSIS — K68.11: Primary | ICD-10-CM

## 2017-11-16 DIAGNOSIS — E87.6: ICD-10-CM

## 2017-11-16 DIAGNOSIS — Z16.21: ICD-10-CM

## 2017-11-16 DIAGNOSIS — B37.0: ICD-10-CM

## 2017-11-16 DIAGNOSIS — E83.51: ICD-10-CM

## 2017-11-16 DIAGNOSIS — Z90.49: ICD-10-CM

## 2017-11-16 DIAGNOSIS — K91.89: ICD-10-CM
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
## 2017-11-16 DIAGNOSIS — Z88.0: ICD-10-CM

## 2017-11-16 DIAGNOSIS — K44.9: ICD-10-CM

## 2017-11-16 DIAGNOSIS — M06.9: ICD-10-CM

## 2017-11-16 DIAGNOSIS — I70.90: ICD-10-CM

## 2017-11-16 DIAGNOSIS — K57.90: ICD-10-CM

## 2017-11-16 LAB
ADD UMIC: YES
ALBUMIN SERPL-MCNC: 3.5 G/DL (ref 3.3–4.9)
ALBUMIN/GLOB SERPL: 1 {RATIO}
ALP SERPL-CCNC: 161 IU/L (ref 42–121)
ALT SERPL-CCNC: 36 IU/L (ref 13–69)
ANION GAP SERPL CALC-SCNC: 16 MMOL/L (ref 8–16)
AST SERPL-CCNC: 23 IU/L (ref 15–46)
BASOPHILS # BLD AUTO: 0.1 10^3/UL (ref 0–0.1)
BASOPHILS NFR BLD: 0.5 % (ref 0–2)
BILIRUB DIRECT SERPL-MCNC: 0 MG/DL (ref 0–0.2)
BILIRUB SERPL-MCNC: 0.2 MG/DL (ref 0.2–1.3)
BUN SERPL-MCNC: 9 MG/DL (ref 7–20)
CALCIUM SERPL-MCNC: 9.1 MG/DL (ref 8.4–10.2)
CHLORIDE SERPL-SCNC: 104 MMOL/L (ref 97–110)
CO2 SERPL-SCNC: 24 MMOL/L (ref 21–31)
COLOR UR: YELLOW
CREAT SERPL-MCNC: 0.65 MG/DL (ref 0.44–1)
EOSINOPHIL # BLD: 0.1 10^3/UL (ref 0–0.5)
EOSINOPHIL NFR BLD: 0.5 % (ref 0–7)
ERYTHROCYTE [DISTWIDTH] IN BLOOD BY AUTOMATED COUNT: 16.8 % (ref 11.5–14.5)
GLOBULIN SER-MCNC: 3.5 G/DL (ref 1.3–3.2)
GLUCOSE SERPL-MCNC: 143 MG/DL (ref 70–220)
GLUCOSE UR STRIP-MCNC: NEGATIVE MG/DL
HCT VFR BLD CALC: 30.1 % (ref 37–47)
HGB BLD-MCNC: 9.4 G/DL (ref 12–16)
KETONES UR STRIP.AUTO-MCNC: NEGATIVE MG/DL
LYMPHOCYTES # BLD AUTO: 1.9 10^3/UL (ref 0.8–2.9)
LYMPHOCYTES NFR BLD AUTO: 15.3 % (ref 15–51)
MCH RBC QN AUTO: 26.3 PG (ref 29–33)
MCHC RBC AUTO-ENTMCNC: 31.2 G/DL (ref 32–37)
MCV RBC AUTO: 84.1 FL (ref 82–101)
MONOCYTES # BLD: 0.6 10^3/UL (ref 0.3–0.9)
MONOCYTES NFR BLD: 5.1 % (ref 0–11)
NEUTROPHILS # BLD: 9.8 10^3/UL (ref 1.6–7.5)
NEUTROPHILS NFR BLD AUTO: 78.2 % (ref 39–77)
NITRITE UR QL STRIP.AUTO: NEGATIVE MG/DL
NRBC # BLD MANUAL: 0 10^3/UL (ref 0–0)
NRBC BLD AUTO-RTO: 0 /100WBC (ref 0–0)
PLATELET # BLD: 520 10^3/UL (ref 140–415)
PMV BLD AUTO: 9.3 FL (ref 7.4–10.4)
POTASSIUM SERPL-SCNC: 3.7 MMOL/L (ref 3.5–5.1)
PROT SERPL-MCNC: 7 G/DL (ref 6.1–8.1)
RBC # BLD AUTO: 3.58 10^6/UL (ref 4.2–5.4)
RBC # UR AUTO: (no result) MG/DL
SODIUM SERPL-SCNC: 140 MMOL/L (ref 135–144)
UR ASCORBIC ACID: NEGATIVE MG/DL
UR BILIRUBIN (DIP): NEGATIVE MG/DL
UR CLARITY: CLEAR
UR PH (DIP): 7 (ref 5–9)
UR RBC: 1 /HPF (ref 0–5)
UR SPECIFIC GRAVITY (DIP): 1 (ref 1–1.03)
UR TOTAL PROTEIN (DIP): NEGATIVE MG/DL
UROBILINOGEN UR STRIP-ACNC: (no result) MG/DL
WBC # BLD AUTO: 12.5 10^3/UL (ref 4.8–10.8)
WBC # UR STRIP: NEGATIVE LEU/UL

## 2017-11-16 PROCEDURE — 85730 THROMBOPLASTIN TIME PARTIAL: CPT

## 2017-11-16 PROCEDURE — 87075 CULTR BACTERIA EXCEPT BLOOD: CPT

## 2017-11-16 PROCEDURE — 81001 URINALYSIS AUTO W/SCOPE: CPT

## 2017-11-16 PROCEDURE — 87040 BLOOD CULTURE FOR BACTERIA: CPT

## 2017-11-16 PROCEDURE — 80076 HEPATIC FUNCTION PANEL: CPT

## 2017-11-16 PROCEDURE — 83605 ASSAY OF LACTIC ACID: CPT

## 2017-11-16 PROCEDURE — 36415 COLL VENOUS BLD VENIPUNCTURE: CPT

## 2017-11-16 PROCEDURE — 87086 URINE CULTURE/COLONY COUNT: CPT

## 2017-11-16 PROCEDURE — 80048 BASIC METABOLIC PNL TOTAL CA: CPT

## 2017-11-16 PROCEDURE — 84145 PROCALCITONIN (PCT): CPT

## 2017-11-16 PROCEDURE — C2617 STENT, NON-COR, TEM W/O DEL: HCPCS

## 2017-11-16 PROCEDURE — 82150 ASSAY OF AMYLASE: CPT

## 2017-11-16 PROCEDURE — 74181 MRI ABDOMEN W/O CONTRAST: CPT

## 2017-11-16 PROCEDURE — 78226 HEPATOBILIARY SYSTEM IMAGING: CPT

## 2017-11-16 PROCEDURE — 83690 ASSAY OF LIPASE: CPT

## 2017-11-16 PROCEDURE — 96374 THER/PROPH/DIAG INJ IV PUSH: CPT

## 2017-11-16 PROCEDURE — 87081 CULTURE SCREEN ONLY: CPT

## 2017-11-16 PROCEDURE — 71010: CPT

## 2017-11-16 PROCEDURE — 87070 CULTURE OTHR SPECIMN AEROBIC: CPT

## 2017-11-16 PROCEDURE — 74330 X-RAY BILE/PANC ENDOSCOPY: CPT

## 2017-11-16 PROCEDURE — 77012 CT SCAN FOR NEEDLE BIOPSY: CPT

## 2017-11-16 PROCEDURE — 85610 PROTHROMBIN TIME: CPT

## 2017-11-16 PROCEDURE — 74176 CT ABD & PELVIS W/O CONTRAST: CPT

## 2017-11-16 PROCEDURE — A9537 TC99M MEBROFENIN: HCPCS

## 2017-11-16 PROCEDURE — 85025 COMPLETE CBC W/AUTO DIFF WBC: CPT

## 2017-11-16 PROCEDURE — 82962 GLUCOSE BLOOD TEST: CPT

## 2017-11-16 PROCEDURE — 80053 COMPREHEN METABOLIC PANEL: CPT

## 2017-11-16 PROCEDURE — 96375 TX/PRO/DX INJ NEW DRUG ADDON: CPT

## 2017-11-16 NOTE — HP
Date/Time of Note


Date/Time of Note


DATE: 11/16/17 


TIME: 15:46





Assessment/Plan


VTE Prophylaxis


VTE Prophylaxis Intervention:  other





Lines/Catheters


Central line still needed:  Yes





Assessment/Plan


Assessment/Plan


-Postoperative abscess


-S/p  laparoscopic cholecystectomy 11/26/17.


-Status post ERCP with stent placement status post subsequent stent removal


-Hypertension


-Hypothyroidism


-RA





Further recommendations based on clinical course.  Plan of care discussed with 

Dr. Brooks





HPI/ROS


Admit Date/Time


Admit Date/Time








Hx of Present Illness


Patient is 82-year-old pleasant female who underwent laparoscopic 

cholecystectomy in October 25 by Dr. Edwards.  Patient was evaluated by Dr. Edwards in the office and noted to have leukocytosis and patient was sent for 

further evaluation and management to emergency room.  Patient complains of 

nausea, especially in the morning, also complains of dysuria, denies any 

shortness of breath, denies any chest pain.  Patient underwent CT abdomen and 

pelvis revealed fluid collection in the right upper quadrant consistent with 

postop infection versus biloma per radiology.  Urinalysis did not reveal reveal 

any signs of infection. Chest x-ray is unremarkable. Patient will be admitted 

for further evaluation and management.





ROS


Per HPI





PMH/Family/Social


Past Medical History


Medical History:  hypertension, hypothyroid, other (Rheumatoid arthritis)





Past Surgical History


That is post laparoscopic cholecystectomy in October 25, 2017


That is post ERCP and stent placement and status post subsequent subsequent 

stent removal Dr. Joyce





Family History


Significant Family History:  no pertinent family hx





Social History


Alcohol Use:  none


Smoking Status:  Never smoker


Drug Use:  none





Exam/Review of Systems


Vital Signs


Vitals





 Vital Signs








  Date Time  Temp Pulse Resp B/P Pulse Ox O2 Delivery O2 Flow Rate FiO2


 


11/16/17 15:00 98.3 72 18 143/46 100 Room Air  











Exam


Constitutional:  alert, oriented


Head:  normocephalic


Neck:  supple


Respiratory:  normal air movement


Cardiovascular:  nl pulses


Gastrointestinal:  non-tender, soft


Musculoskeletal:  nl extremities to inspection


Extremities:  normal pulses


Neurological:  nl mental status





Labs


Result Diagram:  


11/16/17 1235                                                                  

              11/16/17 1235








Medications


Medications





 Current Medications


Vancomycin HCl (Vancocin) 250 ml @  125 mls/hr ONCE  ONCE IVPB  Last 

administered on 11/16/17at 15:34; Admin Dose 125 MLS/HR;  Start 11/16/17 at 14:

00;  Stop 11/16/17 at 15:59











RADHA DE JESUS Nov 16, 2017 15:57

## 2017-11-16 NOTE — CONS
Date/Time of Note


Date/Time of Note


DATE: 11/16/17 


TIME: 17:24





Assessment/Plan


Assessment/Plan


Chief Complaint/Hosp Course


1.  Complex collection in gb fossa: ?abscess vs biloma


-ir drain


-abx


-pain management


2. Leukocytosis: likely 2/2 #1


-as above


3. Elevated alk phos: likely 2/2 #1


-as above


4. Diverticulosis


-medical management


3. Hiatal hernia: asymptomatic


-outpatient surgical correction if becomes symptomatic


4. Atherosclerosis


-medical management








Thank you. Patient seen and examined in collaboration with Dr. Theodore Edwards.


Problems:  





Consultation Date/Type/Reason


Admit Date/Time





Date of Consultation:  Nov 16, 2017


Type of Consultation:  surgical


Reason for Consultation


s/p cholecystectomy


Referring Provider:  RADHA FERRO of Present Illness


Makayla Diaz is an 82-year-old pleasant female who underwent laparoscopic 

cholecystectomy on October 25 by our service. She was seen today in the office 

and noted to have persistent leukocytosis and dysuria and was sent for further 

evaluation and management to emergency room. Associated symptoms include nausea

, especially in the morning. She denies shortness of breath, chest pain, 

palpitations, nausea, vomiting, diarrhea.  CT abdomen and pelvis showed fluid 

collection in the right upper quadrant consistent with postop infection versus 

biloma per radiology.  Urinalysis did not reveal reveal any signs of infection. 

General surgery was asked to evaluate.


Constitutional:  No chills, No febrile


Eyes:  No discharge, No visual change


ENT:  No congestion, No sore throat


Respiratory:  No cough, No shortness of breath


Cardiovascular:  No chest pain, No edema, No lightheadedness


Gastrointestinal:  No constipation, No diarrhea, No nausea, No pain, No vomiting


Genitourinary:  dysuria, 


   No hematuria


Musculoskeletal:  No restricted range of motion


Skin:  No bruising, No erythema


Neurologic:  No focal-weakness


Psychological:  No anxiety, No confusion





Past Medical History


Medical History:  hypertension, hypothyroid, other (Rheumatoid arthritis)





Past Surgical History


ERCP and stent placement and status post subsequent subsequent stent removal 


laparoscopic cholecystectomy in October 25, 2017





Social History


Alcohol Use:  none


Smoking Status:  Never smoker


Drug Use:  none





Exam/Review of Systems


Vital Signs


Vitals





 Vital Signs








  Date Time  Temp Pulse Resp B/P Pulse Ox O2 Delivery O2 Flow Rate FiO2


 


11/16/17 15:00 98.3 72 18 143/46 100 Room Air  











Exam


Constitutional:  alert, oriented


Psych:  nl mood/affect


Head:  atraumatic, normocephalic


Eyes:  nl lids, nl sclera


ENMT:  mucosa pink and moist, nl nasal mucosa & septum


Neck:  non-tender, supple


Respiratory:  clear to auscultation, normal air movement


Cardiovascular:  nl pulses, regular rate and rhythm


Gastrointestinal:  non-tender, soft, surgical scars (dry without drainage/

bruising/discoloration)


Genitourinary - Female:  nl adnexae, nl external genitalia


Musculoskeletal:  nl extremities to inspection, nl gait and stance


Extremities:  normal pulses


Neurological:  nl mental status, nl speech, nl strength


Skin:  nl turgor, rash or lesions


Lymph:  nl lymph nodes





Results


Result Diagram:  


11/16/17 1235                                                                  

              11/16/17 1235





Results 24 hrs





Laboratory Tests








Test


  11/16/17


12:35 11/16/17


13:00 11/16/17


16:22


 


White Blood Count 12.5  #H  


 


Red Blood Count 3.58  L  


 


Hemoglobin 9.4  L  


 


Hematocrit 30.1  L  


 


Mean Corpuscular Volume 84.1    


 


Mean Corpuscular Hemoglobin 26.3  L  


 


Mean Corpuscular Hemoglobin


Concent 31.2  L


  


  


 


 


Red Cell Distribution Width 16.8  H  


 


Platelet Count 520  H  


 


Mean Platelet Volume 9.3    


 


Neutrophils % 78.2  H  


 


Lymphocytes % 15.3    


 


Monocytes % 5.1    


 


Eosinophils % 0.5    


 


Basophils % 0.5    


 


Nucleated Red Blood Cells % 0.0    


 


Neutrophils # 9.8  H  


 


Lymphocytes # 1.9    


 


Monocytes # 0.6    


 


Eosinophils # 0.1    


 


Basophils # 0.1    


 


Nucleated Red Blood Cells # 0.0    


 


Sodium Level 140    


 


Potassium Level 3.7    


 


Chloride Level 104    


 


Carbon Dioxide Level 24    


 


Anion Gap 16    


 


Blood Urea Nitrogen 9    


 


Creatinine 0.65    


 


Glucose Level 143    


 


Calcium Level 9.1    


 


Total Bilirubin 0.2    


 


Direct Bilirubin 0.00    


 


Indirect Bilirubin 0.2    


 


Aspartate Amino Transf


(AST/SGOT) 23  


  


  


 


 


Alanine Aminotransferase


(ALT/SGPT) 36  


  


  


 


 


Alkaline Phosphatase 161  H  


 


Total Protein 7.0    


 


Albumin 3.5    


 


Globulin 3.50  H  


 


Albumin/Globulin Ratio 1.00    


 


Lipase 40    


 


Urine Color  YELLOW   


 


Urine Clarity  CLEAR   


 


Urine pH  7.0   


 


Urine Specific Gravity  1.005   


 


Urine Ketones  NEGATIVE   


 


Urine Nitrite  NEGATIVE   


 


Urine Bilirubin  NEGATIVE   


 


Urine Urobilinogen  2+  H 


 


Urine Leukocyte Esterase  NEGATIVE   


 


Urine Microscopic RBC  1   


 


Urine Microscopic WBC  1   


 


Urine Hemoglobin  1+  H 


 


Urine Glucose  NEGATIVE   


 


Urine Total Protein  NEGATIVE   


 


Lactic Acid Level  1.8   


 


Bedside Glucose   115  











Medications


Medications





 Current Medications


Meropenem/Sodium Chloride (Merrem 500mg/50 ml(Pmx)) 50 ml @  100 mls/hr Q8 IVPB 

;  Start 11/16/17 at 22:00











SHILPI IZAGUIRRE NP Nov 16, 2017 17:32

## 2017-11-16 NOTE — ERD
ER Documentation


Chief Complaint


Chief Complaint


pt bib family for eval of high WBC, sent for blood work and CT





HPI


Patient is a 82-year-old female who presents with abdominal pain and high white 

blood cell count.  The patient was sent by Dr. Edwards the surgeon who did a 

cholecystectomy on October 25.  The patient had a high white blood cell count 

of 17 and dysuria over the past few days.  Dr. Edwards wanted the patient to 

get a CT scan of the abdomen pelvis, urinalysis, CBC, and CMP.  The patient 

denies fevers.  She has had no recent antibiotics.  The pain is in the right 

upper quadrant.





ROS


All systems reviewed and are negative except as per history of present illness.





Medications


Home Meds


Active Scripts


Docusate Sodium* (Colace*) 100 Mg Capsule, 100 MG PO DAILY, #30 CAP


   Prov:CRISPIN RAMIREZ         4/23/17


Hydrocodone/Acetaminophen (Norco 5-325 Tablet) 1 Each Tablet, 1 EACH PO Q6, #14 

TAB


   Prov:CRISPIN RAMIREZ         4/23/17


Reported Medications


Ezetimibe* (Zetia*) 10 Mg Tablet, 10 MG PO HS, TAB


   11/16/17


Cholecalciferol* (Vitamin D3*) 1,000 Unit Tablet, 5000 UNIT PO DAILY, TAB


   4/7/17


Levothyroxine Sodium* (Levoxyl*) 50 Mcg Tablet, 50 MCG PO BEFORE BREAKFAST, #30 

TAB


   4/7/17


Acarbose* (Precose*) 50 Mg Tablet, 50 MG PO WITH BREAKFAST, TAB


   4/7/17


Valsartan* (Diovan*) 160 Mg Tablet, 160 MG PO DAILY, TAB


   12/6/16


Esomeprazole Mag Trihydrate (Nexium) 40 Mg Capsule.dr, 40 MG PO DAILY


   10/20/12


Discontinued Scripts


Metronidazole* (Flagyl*) 500 Mg Tablet, 500 MG PO TID for 7 Days, TAB


   Prov:CRISPIN RAMIREZ         4/23/17


Levofloxacin* (Levaquin*) 500 Mg Tablet, 500 MG PO DAILY for 7 Days, TAB


   Prov:CRISPIN RAMIREZ         4/23/17


Levofloxacin* (Levaquin*) 500 Mg Tablet, 500 MG PO DAILY for 7 Days, TAB


   Prov:CRISPIN RAMIREZ         4/12/17


Docusate Sodium (Dok) 100 Mg Capsule, 100 MG PO BID for 30 Days, CAP


   Prov:CRISPIN RAMIREZ         4/12/17





Allergies


Allergies:  


Coded Allergies:  


     penicillin G (Verified  Allergy, Intermediate, RASH, HEADACHE, 4/18/17)





PMhx/Soc


History of Surgery:  No


Anesthesia Reaction:  No


Hx Neurological Disorder:  No


Hx Respiratory Disorders:  No


Hx Cardiac Disorders:  Yes (htn)


Hx Psychiatric Problems:  No


Hx Miscellaneous Medical Probl:  No


Hx Alcohol Use:  No


Hx Substance Use:  No


Hx Tobacco Use:  No





FmHx


Family History:  No diabetes





Physical Exam


Vitals





Vital Signs








  Date Time  Temp Pulse Resp B/P Pulse Ox O2 Delivery O2 Flow Rate FiO2


 


11/16/17 11:58 97.4 82 16 195/77 98   








Physical Exam


Const: No acute distress


Head:   Atraumatic 


Eyes:    Normal Conjunctiva


ENT:    Normal External Ears, Nose and Mouth.


Neck:               Full range of motion..~ No meningismus.


Resp:    Clear to auscultation bilaterally


Cardio:    Regular rate and rhythm, no murmurs


Abd:    Soft, right upper quadrant tenderness to palpation without rebound or 

guarding


Skin:    No petechiae or rashes


Back:    No midline or flank tenderness


Ext:    No cyanosis, or edema


Neur:    Awake and alert


Psych:    Normal Mood and Affect


Result Diagram:  


11/16/17 1235                                                                  

              11/16/17 1235





Results 24 hrs





 Laboratory Tests








Test


  11/16/17


12:35 11/16/17


13:00


 


White Blood Count 12.510^3/ul  


 


Red Blood Count 3.5810^6/ul  


 


Hemoglobin 9.4g/dl  


 


Hematocrit 30.1%  


 


Mean Corpuscular Volume 84.1fl  


 


Mean Corpuscular Hemoglobin 26.3pg  


 


Mean Corpuscular Hemoglobin


Concent 31.2g/dl 


  


 


 


Red Cell Distribution Width 16.8%  


 


Platelet Count 39287^3/UL  


 


Mean Platelet Volume 9.3fl  


 


Neutrophils % 78.2%  


 


Lymphocytes % 15.3%  


 


Monocytes % 5.1%  


 


Eosinophils % 0.5%  


 


Basophils % 0.5%  


 


Nucleated Red Blood Cells % 0.0/100WBC  


 


Neutrophils # 9.810^3/ul  


 


Lymphocytes # 1.910^3/ul  


 


Monocytes # 0.610^3/ul  


 


Eosinophils # 0.110^3/ul  


 


Basophils # 0.110^3/ul  


 


Nucleated Red Blood Cells # 0.010^3/ul  


 


Sodium Level 140mmol/L  


 


Potassium Level 3.7mmol/L  


 


Chloride Level 104mmol/L  


 


Carbon Dioxide Level 24mmol/L  


 


Anion Gap 16  


 


Blood Urea Nitrogen 9mg/dl  


 


Creatinine 0.65mg/dl  


 


Glucose Level 143mg/dl  


 


Calcium Level 9.1mg/dl  


 


Total Bilirubin 0.2mg/dl  


 


Direct Bilirubin 0.00mg/dl  


 


Indirect Bilirubin 0.2mg/dl  


 


Aspartate Amino Transf


(AST/SGOT) 23IU/L 


  


 


 


Alanine Aminotransferase


(ALT/SGPT) 36IU/L 


  


 


 


Alkaline Phosphatase 161IU/L  


 


Total Protein 7.0g/dl  


 


Albumin 3.5g/dl  


 


Globulin 3.50g/dl  


 


Albumin/Globulin Ratio 1.00  


 


Lipase 40U/L  


 


Urine Color  YELLOW 


 


Urine Clarity  CLEAR 


 


Urine pH  7.0 


 


Urine Specific Gravity  1.005 


 


Urine Ketones  NEGATIVEmg/dL 


 


Urine Nitrite  NEGATIVEmg/dL 


 


Urine Bilirubin  NEGATIVEmg/dL 


 


Urine Urobilinogen  2+mg/dL 


 


Urine Leukocyte Esterase  NEGATIVELeu/ul 


 


Urine Microscopic RBC  1/HPF 


 


Urine Microscopic WBC  1/HPF 


 


Urine Hemoglobin  1+mg/dL 


 


Urine Glucose  NEGATIVEmg/dL 


 


Urine Total Protein  NEGATIVEmg/dl 


 


Lactic Acid Level  1.8mmol/L 








 Current Medications








 Medications


  (Trade)  Dose


 Ordered  Sig/James


 Route


 PRN Reason  Start Time


 Stop Time Status Last Admin


Dose Admin


 


 Sodium Chloride


 1610 ml  1,610 ml  BOLUS OVER 2 HOURS STAT


 IV*


   11/16/17 13:47


 11/16/17 13:48 DC 11/16/17 13:58


 


 


 Vancomycin HCl  250 ml @ 


 125 mls/hr  ONCE  ONCE


 IVPB


   11/16/17 14:00


 11/16/17 15:59   


 


 


 Aztreonam


  (Azactam 1gm/NS


  (Pmx))  50 ml @ 


 100 mls/hr  ONCE  STAT


 IVPB


   11/16/17 13:47


 11/16/17 14:16   


 











Procedures/MDM


CT abdomen and pelvis shows a 5 x 3 cm fluid collection in the right upper 

quadrant consistent with postop infection versus biloma per radiology.





Patient is a 82-year-old female who presents with a postop infection from 

previous cholecystectomy.  The patient was given broad-spectrum antibiotics 

with vancomycin and aztreonam after blood cultures were drawn.  At this point I 

doubt sepsis as she has no SIRS criteria.  The patient will be admitted to the 

care of Dr. Bard harmon who is covering for Dr. Miguel the patient's primary 

doctor.  I spoke with Dr. Edwards the surgeon who will see the patient in 

consultation.  The patient will likely benefit from percutaneous drainage.





Departure


Diagnosis:  


 Primary Impression:  


 Postoperative abscess


 Encounter type:  initial encounter  Qualified Code:  T81.4XXA - Postoperative 

abscess, initial encounter


Condition:  SHAILESH Palma MD Nov 16, 2017 14:02

## 2017-11-17 VITALS — SYSTOLIC BLOOD PRESSURE: 164 MMHG | DIASTOLIC BLOOD PRESSURE: 70 MMHG | RESPIRATION RATE: 18 BRPM

## 2017-11-17 VITALS — DIASTOLIC BLOOD PRESSURE: 60 MMHG | SYSTOLIC BLOOD PRESSURE: 132 MMHG | RESPIRATION RATE: 18 BRPM

## 2017-11-17 VITALS — RESPIRATION RATE: 18 BRPM | SYSTOLIC BLOOD PRESSURE: 121 MMHG | DIASTOLIC BLOOD PRESSURE: 56 MMHG

## 2017-11-17 VITALS — RESPIRATION RATE: 19 BRPM | DIASTOLIC BLOOD PRESSURE: 82 MMHG | SYSTOLIC BLOOD PRESSURE: 132 MMHG

## 2017-11-17 VITALS — RESPIRATION RATE: 18 BRPM | DIASTOLIC BLOOD PRESSURE: 61 MMHG | SYSTOLIC BLOOD PRESSURE: 129 MMHG

## 2017-11-17 LAB
ANION GAP SERPL CALC-SCNC: 9 MMOL/L (ref 8–16)
APTT BLD: 29.9 SEC (ref 25–35)
BASOPHILS # BLD AUTO: 0.1 10^3/UL (ref 0–0.1)
BASOPHILS NFR BLD: 0.7 % (ref 0–2)
BUN SERPL-MCNC: 7 MG/DL (ref 7–20)
CALCIUM SERPL-MCNC: 8.5 MG/DL (ref 8.4–10.2)
CHLORIDE SERPL-SCNC: 111 MMOL/L (ref 97–110)
CO2 SERPL-SCNC: 25 MMOL/L (ref 21–31)
CREAT SERPL-MCNC: 0.57 MG/DL (ref 0.44–1)
EOSINOPHIL # BLD: 0.2 10^3/UL (ref 0–0.5)
EOSINOPHIL NFR BLD: 1.6 % (ref 0–7)
ERYTHROCYTE [DISTWIDTH] IN BLOOD BY AUTOMATED COUNT: 16.7 % (ref 11.5–14.5)
GLUCOSE SERPL-MCNC: 99 MG/DL (ref 70–220)
HCT VFR BLD CALC: 26.5 % (ref 37–47)
HGB BLD-MCNC: 8.3 G/DL (ref 12–16)
INR PPP: 1.04
LYMPHOCYTES # BLD AUTO: 2.2 10^3/UL (ref 0.8–2.9)
LYMPHOCYTES NFR BLD AUTO: 23.7 % (ref 15–51)
MCH RBC QN AUTO: 26.8 PG (ref 29–33)
MCHC RBC AUTO-ENTMCNC: 31.3 G/DL (ref 32–37)
MCV RBC AUTO: 85.5 FL (ref 82–101)
MONOCYTES # BLD: 0.6 10^3/UL (ref 0.3–0.9)
MONOCYTES NFR BLD: 6.9 % (ref 0–11)
NEUTROPHILS # BLD: 6.1 10^3/UL (ref 1.6–7.5)
NEUTROPHILS NFR BLD AUTO: 66.7 % (ref 39–77)
NRBC # BLD MANUAL: 0 10^3/UL (ref 0–0)
NRBC BLD AUTO-RTO: 0 /100WBC (ref 0–0)
PLATELET # BLD: 413 10^3/UL (ref 140–415)
PMV BLD AUTO: 9.4 FL (ref 7.4–10.4)
POTASSIUM SERPL-SCNC: 3.6 MMOL/L (ref 3.5–5.1)
PROTHROMBIN TIME: 13.6 SEC (ref 12.2–14.2)
PT RATIO: 1.1
RBC # BLD AUTO: 3.1 10^6/UL (ref 4.2–5.4)
SODIUM SERPL-SCNC: 141 MMOL/L (ref 135–144)
WBC # BLD AUTO: 9.1 10^3/UL (ref 4.8–10.8)

## 2017-11-17 PROCEDURE — 0W9G3ZZ DRAINAGE OF PERITONEAL CAVITY, PERCUTANEOUS APPROACH: ICD-10-PCS | Performed by: RADIOLOGY

## 2017-11-17 RX ADMIN — MORPHINE SULFATE PRN MG: 2 INJECTION, SOLUTION INTRAMUSCULAR; INTRAVENOUS at 13:38

## 2017-11-17 RX ADMIN — MORPHINE SULFATE PRN MG: 2 INJECTION, SOLUTION INTRAMUSCULAR; INTRAVENOUS at 21:00

## 2017-11-17 RX ADMIN — DEXAMETHASONE SODIUM PHOSPHATE PRN MG: 10 INJECTION, SOLUTION INTRAMUSCULAR; INTRAVENOUS at 13:38

## 2017-11-17 NOTE — PN
Date/Time of Note


Date/Time of Note


DATE: 11/17/17 


TIME: 11:07





Assessment/Plan


VTE Prophylaxis


VTE Prophylaxis Intervention:  SCD's





Lines/Catheters


IV Catheter Type (from [facility]):  Saline Lock


Urinary Cath still in place:  No





Assessment/Plan


Chief Complaint/Hosp Course


Pt is taken to radiology for abscess drainage, no acute events reported prior 

to procedure.


Assessment/Plan


-Postoperative abscess, undergoing drainage by radiology


-S/p  laparoscopic cholecystectomy 11/26/17.


-Status post ERCP with stent placement status post subsequent stent removal


-Hypertension


-Hypothyroidism


-RA





Further recommendations based on clinical course.  Plan of care discussed with 

Dr. Brooks


Problems:  





Exam/Review of Systems


Vital Signs


Vitals





 Vital Signs








  Date Time  Temp Pulse Resp B/P Pulse Ox O2 Delivery O2 Flow Rate FiO2


 


11/17/17 08:00 98.3 62 18 132/60 99   


 


11/16/17 16:00      Room Air  














 Intake and Output   


 


 11/16/17 11/16/17 11/17/17





 15:00 23:00 07:00


 


Intake Total  350 ml 530 ml


 


Balance  350 ml 530 ml











Results


Result Diagram:  


11/17/17 0524                                                                  

              11/17/17 0524





Results 24 hrs





Laboratory Tests








Test


  11/16/17


12:35 11/16/17


13:00 11/16/17


16:22 11/17/17


05:24


 


White Blood Count 12.5  #H   9.1  #


 


Red Blood Count 3.58  L   3.10  L


 


Hemoglobin 9.4  L   8.3  L


 


Hematocrit 30.1  L   26.5  L


 


Mean Corpuscular Volume 84.1     85.5  


 


Mean Corpuscular Hemoglobin 26.3  L   26.8  L


 


Mean Corpuscular Hemoglobin


Concent 31.2  L


  


  


  31.3  L


 


 


Red Cell Distribution Width 16.8  H   16.7  H


 


Platelet Count 520  H   413  #


 


Mean Platelet Volume 9.3     9.4  


 


Neutrophils % 78.2  H   66.7  


 


Lymphocytes % 15.3     23.7  


 


Monocytes % 5.1     6.9  


 


Eosinophils % 0.5     1.6  


 


Basophils % 0.5     0.7  


 


Nucleated Red Blood Cells % 0.0     0.0  


 


Neutrophils # 9.8  H   6.1  


 


Lymphocytes # 1.9     2.2  


 


Monocytes # 0.6     0.6  


 


Eosinophils # 0.1     0.2  


 


Basophils # 0.1     0.1  


 


Nucleated Red Blood Cells # 0.0     0.0  


 


Sodium Level 140     141  


 


Potassium Level 3.7     3.6  


 


Chloride Level 104     111  H


 


Carbon Dioxide Level 24     25  


 


Anion Gap 16     9  #


 


Blood Urea Nitrogen 9     7  


 


Creatinine 0.65     0.57  


 


Glucose Level 143     99  #


 


Calcium Level 9.1     8.5  


 


Total Bilirubin 0.2     


 


Direct Bilirubin 0.00     


 


Indirect Bilirubin 0.2     


 


Aspartate Amino Transf


(AST/SGOT) 23  


  


  


  


 


 


Alanine Aminotransferase


(ALT/SGPT) 36  


  


  


  


 


 


Alkaline Phosphatase 161  H   


 


Total Protein 7.0     


 


Albumin 3.5     


 


Globulin 3.50  H   


 


Albumin/Globulin Ratio 1.00     


 


Lipase 40     


 


Urine Color  YELLOW    


 


Urine Clarity  CLEAR    


 


Urine pH  7.0    


 


Urine Specific Gravity  1.005    


 


Urine Ketones  NEGATIVE    


 


Urine Nitrite  NEGATIVE    


 


Urine Bilirubin  NEGATIVE    


 


Urine Urobilinogen  2+  H  


 


Urine Leukocyte Esterase  NEGATIVE    


 


Urine Microscopic RBC  1    


 


Urine Microscopic WBC  1    


 


Urine Hemoglobin  1+  H  


 


Urine Glucose  NEGATIVE    


 


Urine Total Protein  NEGATIVE    


 


Lactic Acid Level  1.8    


 


Bedside Glucose   115   


 


Prothrombin Time    13.6  


 


Prothrombin Time Ratio    1.1  


 


INR International Normalized


Ratio 


  


  


  1.04  


 


 


Activated Partial


Thromboplast Time 


  


  


  29.9  


 











Medications


Medications





 Current Medications


Meropenem/Sodium Chloride (Merrem 500mg/50 ml(Pmx)) 50 ml @  100 mls/hr Q8 IVPB

  Last administered on 11/17/17at 06:16; Admin Dose 100 MLS/HR;  Start 11/16/17 

at 22:00











RADHA DE JESUS Nov 17, 2017 11:09

## 2017-11-17 NOTE — PN
Date/Time of Note


Date/Time of Note


DATE: 11/17/17 


TIME: 17:08





Assessment/Plan


Lines/Catheters


IV Catheter Type (from [facility]):  Saline Lock


Riojas in Place (from Nrs):  No





Assessment/Plan


Chief Complaint/Hosp Course


1.  Complex collection in gb fossa: abscess: s/p ir drain


-abx


-pain management


2. Leukocytosis: likely 2/2 #1


-as above


3. Elevated alk phos: likely 2/2 #1


-as above


4. Diverticulosis


-medical management


3. Hiatal hernia: asymptomatic


-outpatient surgical correction if becomes symptomatic


4. Atherosclerosis


-medical management








Thank you. Patient seen and examined in collaboration with Dr. Theodore Edwards.


Problems:  





Subjective


24 Hr Interval Summary


S/p CT guided abscess drainage. Min tenderness. No fevers, chills, sob, 

congested cough, cp, palpitations, ha, dizziness, n/v/d/dysuria.





Exam/Review of Systems


Vital Signs


Vitals





 Vital Signs








  Date Time  Temp Pulse Resp B/P Pulse Ox O2 Delivery O2 Flow Rate FiO2


 


11/17/17 16:01 98.6 82 18 129/61 97   


 


11/16/17 16:00      Room Air  














 Intake and Output   


 


 11/16/17 11/16/17 11/17/17





 15:00 23:00 07:00


 


Intake Total  350 ml 530 ml


 


Balance  350 ml 530 ml











Exam


Free Text/Dictation


Constitutional:  alert, oriented


Psych:  nl mood/affect


Head:  atraumatic, normocephalic


Eyes:  nl lids, nl sclera


ENMT:  mucosa pink and moist, nl nasal mucosa & septum


Neck:  non-tender, supple


Respiratory:  clear to auscultation, normal air movement


Cardiovascular:  nl pulses, regular rate and rhythm


Gastrointestinal:  non-tender, soft, surgical scars (dry without drainage/

bruising/discoloration), penrose drain


Genitourinary - Female:  nl adnexae, nl external genitalia


Musculoskeletal:  nl extremities to inspection, nl gait and stance


Extremities:  normal pulses


Neurological:  nl mental status, nl speech, nl strength


Skin:  nl turgor, rash or lesions


Lymph:  nl lymph nodes





Results


Result Diagram:  


11/17/17 0524                                                                  

              11/17/17 0524














SHILPI IZAGUIRRE NP Nov 17, 2017 17:19

## 2017-11-18 VITALS — DIASTOLIC BLOOD PRESSURE: 62 MMHG | RESPIRATION RATE: 16 BRPM | SYSTOLIC BLOOD PRESSURE: 132 MMHG

## 2017-11-18 VITALS — RESPIRATION RATE: 19 BRPM | DIASTOLIC BLOOD PRESSURE: 74 MMHG | SYSTOLIC BLOOD PRESSURE: 168 MMHG

## 2017-11-18 VITALS — SYSTOLIC BLOOD PRESSURE: 113 MMHG | RESPIRATION RATE: 19 BRPM | DIASTOLIC BLOOD PRESSURE: 53 MMHG

## 2017-11-18 LAB
ALBUMIN SERPL-MCNC: 3.5 G/DL (ref 3.3–4.9)
ALP SERPL-CCNC: 142 IU/L (ref 42–121)
ALT SERPL-CCNC: 31 IU/L (ref 13–69)
AST SERPL-CCNC: 20 IU/L (ref 15–46)
BILIRUB DIRECT SERPL-MCNC: 0 MG/DL (ref 0–0.2)
BILIRUB SERPL-MCNC: 0.3 MG/DL (ref 0.2–1.3)
PROT SERPL-MCNC: 7.1 G/DL (ref 6.1–8.1)

## 2017-11-18 RX ADMIN — VALSARTAN SCH MG: 160 TABLET, FILM COATED ORAL at 11:26

## 2017-11-18 RX ADMIN — FOLIC ACID SCH MLS/HR: 5 INJECTION, SOLUTION INTRAMUSCULAR; INTRAVENOUS; SUBCUTANEOUS at 12:26

## 2017-11-18 RX ADMIN — FOLIC ACID SCH MG: 1 TABLET ORAL at 11:25

## 2017-11-18 RX ADMIN — DEXAMETHASONE SODIUM PHOSPHATE PRN MG: 10 INJECTION, SOLUTION INTRAMUSCULAR; INTRAVENOUS at 05:21

## 2017-11-18 RX ADMIN — EZETIMIBE SCH MG: 10 TABLET ORAL at 12:20

## 2017-11-18 RX ADMIN — PANTOPRAZOLE SODIUM SCH MG: 40 TABLET, DELAYED RELEASE ORAL at 05:21

## 2017-11-18 NOTE — PN
Date/Time of Note


Date/Time of Note


DATE: 11/18/17 


TIME: 15:27





Assessment/Plan


Lines/Catheters


IV Catheter Type (from Nrs):  Saline Lock


Riojas in Place (from Nrs):  No





Assessment/Plan


Chief Complaint/Hosp Course


1.  Gallbladder fossa abscess s/p ir drain.  Color more dark brown/?green


-hida


-abx


-pain management


2. Leukocytosis: likely 2/2 #1.  Resolved


-as above


3. Elevated alk phos: likely 2/2 #1


-as above


4. Diverticulosis


-diet/lifestyle optimization


3. Hiatal hernia: asymptomatic


-monitor


4. Atherosclerosis


-diet/exercise optimization





Thank you,


Problems:  





Subjective


24 Hr Interval Summary


Leukocytosis resolved.  Drain color changed.  Min tenderness at drain site.  No 

fevers, chills, sob, congested cough, cp, palpitations, ha, dizziness, n/v/d/

dysuria.








Exam/Review of Systems


Vital Signs


Vitals





 Vital Signs








  Date Time  Temp Pulse Resp B/P Pulse Ox O2 Delivery O2 Flow Rate FiO2


 


11/18/17 08:00 98.2 69 16 134/74 95   


 


11/16/17 16:00      Room Air  














 Intake and Output   


 


 11/17/17 11/17/17 11/18/17





 15:00 23:00 07:00


 


Intake Total 400 ml 480 ml 250 ml


 


Output Total 98 ml  


 


Balance 302 ml 480 ml 250 ml











Exam


Free Text/Dictation


Constitutional:  alert, oriented


Psych:  nl mood/affect


Head:  atraumatic, normocephalic


Eyes:  nl lids, nl sclera


ENMT:  mucosa pink and moist, nl nasal mucosa & septum


Neck:  non-tender, supple


Respiratory:  clear to auscultation, normal air movement


Cardiovascular:  nl pulses, regular rate and rhythm


Gastrointestinal:  min-tender at drain site, soft, surgical scars (dry without 

drainage/bruising/discoloration), drain with dark brown fluid


Musculoskeletal:  nl extremities to inspection, nl gait and stance


Extremities:  normal pulses


Neurological:  nl mental status, nl speech, nl strength


Skin:  nl turgor, rash or lesions


Lymph:  nl lymph nodes





Results


Result Diagram:  


11/17/17 0524                                                                  

              11/17/17 0524














LIANET GROVER MD Nov 18, 2017 15:29

## 2017-11-18 NOTE — PN
Date/Time of Note


Date/Time of Note


DATE: 11/18/17 


TIME: 11:59





Assessment/Plan


VTE Prophylaxis


VTE Prophylaxis Intervention:  ambulation





Lines/Catheters


IV Catheter Type (from Nrs):  Saline Lock


Urinary Cath still in place:  No





Assessment/Plan


Assessment/Plan


-S/p CT guided abscess drainage- 11/17/2017Min tenderness at site.


- Inadequate po fluid intake- Cont  IVF


-S/p  laparoscopic cholecystectomy 10/26/17.


-Status post ERCP with stent placement status post subsequent stent removal


-Hypertension


-Hypothyroidism


-RA





Further recommendations based on clinical course.  Plan of care discussed with 

Dr. Brooks





Subjective


24 Hr Interval Summary


Constitutional:  requiring IVF


Respiratory:  no complaints


Cardiovascular:  no complaints


Gastrointestinal:  pain


Genitourinary:  no complaints


Musculoskeletal:  no complaints





Exam/Review of Systems


Vital Signs


Vitals





 Vital Signs








  Date Time  Temp Pulse Resp B/P Pulse Ox O2 Delivery O2 Flow Rate FiO2


 


11/18/17 08:00 98.2 69 16 134/74 95   


 


11/16/17 16:00      Room Air  














 Intake and Output   


 


 11/17/17 11/17/17 11/18/17





 15:00 23:00 07:00


 


Intake Total 400 ml 480 ml 250 ml


 


Output Total 98 ml  


 


Balance 302 ml 480 ml 250 ml











Exam


Constitutional:  alert, oriented, well developed


Respiratory:  clear to auscultation, diminished breath sounds


Cardiovascular:  nl pulses, other (s1s2)


Gastrointestinal:  other (surgical drain noted), soft, tender


Musculoskeletal:  nl extremities to inspection


Extremities:  normal pulses


Neurological:  nl mental status, nl speech





Results


Result Diagram:  


11/17/17 0524 11/17/17 0524





Results 24 hrs





Laboratory Tests








Test


  11/17/17


12:30


 


Bedside Glucose 101  











Medications


Medications





 Current Medications


Meropenem/Sodium Chloride (Merrem 500mg/50 ml(Pmx)) 50 ml @  100 mls/hr Q8 IVPB

  Last administered on 11/18/17at 05:21; Admin Dose 100 MLS/HR;  Start 11/16/17 

at 22:00


Hydralazine HCl (Apresoline) 10 mg Q6H  PRN IV SBP>170;  Start 11/17/17 at 11:30


Morphine Sulfate (morphine) 1 mg Q4H  PRN IV PAIN Last administered on 11/17/ 17at 21:00; Admin Dose 1 MG;  Start 11/17/17 at 12:30


Ondansetron HCl (Zofran Inj) 4 mg Q4H  PRN IV NAUSEA AND/OR VOMITING Last 

administered on 11/18/17at 05:21; Admin Dose 4 MG;  Start 11/17/17 at 13:00


Acetaminophen (Tylenol Tab) 650 mg Q4H  PRN PO PAIN AND OR ELEVATED TEMP;  

Start 11/17/17 at 13:00


Pantoprazole (Protonix Tab) 40 mg DAILY@06 PO  Last administered on 11/18/17at 

05:21; Admin Dose 40 MG;  Start 11/18/17 at 06:00


Al Hydrox/Mg Hydrox/Simethicone (Mag-Al Plus) 30 ml Q6H  PRN PO 

GASTROINTESTINAL UPSET;  Start 11/17/17 at 20:00


Folic Acid (Folic Acid) 1 mg DAILY PO  Last administered on 11/18/17at 11:25; 

Admin Dose 1 MG;  Start 11/18/17 at 10:00


Levothyroxine Sodium (Synthroid) 50 mcg DAILY@06 PO ;  Start 11/19/17 at 06:00


Valsartan (Diovan) 160 mg DAILY PO  Last administered on 11/18/17at 11:26; 

Admin Dose 160 MG;  Start 11/18/17 at 10:00


EZETIMIBE (Zetia) 10 mg DAILY PO ;  Start 11/18/17 at 10:00


Ibuprofen (Motrin) 800 mg Q6H  PRN PO PAIN;  Start 11/18/17 at 10:00


Cholecalciferol (Vitamin D) 5,000 unit DAILY PO  Last administered on 11/18/ 17at 11:22; Admin Dose 5,000 UNIT;  Start 11/18/17 at 10:00











CRISPIN RAMIREZ Nov 18, 2017 12:10


CRISPIN RAMIREZ Nov 18, 2017 12:10

## 2017-11-19 VITALS — DIASTOLIC BLOOD PRESSURE: 70 MMHG | SYSTOLIC BLOOD PRESSURE: 154 MMHG | RESPIRATION RATE: 18 BRPM

## 2017-11-19 VITALS — RESPIRATION RATE: 19 BRPM | SYSTOLIC BLOOD PRESSURE: 122 MMHG | DIASTOLIC BLOOD PRESSURE: 60 MMHG

## 2017-11-19 VITALS — DIASTOLIC BLOOD PRESSURE: 53 MMHG | RESPIRATION RATE: 16 BRPM | SYSTOLIC BLOOD PRESSURE: 107 MMHG

## 2017-11-19 VITALS — DIASTOLIC BLOOD PRESSURE: 62 MMHG | SYSTOLIC BLOOD PRESSURE: 135 MMHG | RESPIRATION RATE: 18 BRPM

## 2017-11-19 LAB
ANION GAP SERPL CALC-SCNC: 6 MMOL/L (ref 8–16)
BASOPHILS # BLD AUTO: 0.1 10^3/UL (ref 0–0.1)
BASOPHILS NFR BLD: 0.7 % (ref 0–2)
BUN SERPL-MCNC: 5 MG/DL (ref 7–20)
CALCIUM SERPL-MCNC: 8.1 MG/DL (ref 8.4–10.2)
CHLORIDE SERPL-SCNC: 110 MMOL/L (ref 97–110)
CO2 SERPL-SCNC: 27 MMOL/L (ref 21–31)
CREAT SERPL-MCNC: 0.51 MG/DL (ref 0.44–1)
EOSINOPHIL # BLD: 0.2 10^3/UL (ref 0–0.5)
EOSINOPHIL NFR BLD: 1.9 % (ref 0–7)
ERYTHROCYTE [DISTWIDTH] IN BLOOD BY AUTOMATED COUNT: 16.6 % (ref 11.5–14.5)
GLUCOSE SERPL-MCNC: 102 MG/DL (ref 70–220)
HCT VFR BLD CALC: 26.9 % (ref 37–47)
HGB BLD-MCNC: 8.4 G/DL (ref 12–16)
LYMPHOCYTES # BLD AUTO: 1.6 10^3/UL (ref 0.8–2.9)
LYMPHOCYTES NFR BLD AUTO: 18 % (ref 15–51)
MCH RBC QN AUTO: 26.4 PG (ref 29–33)
MCHC RBC AUTO-ENTMCNC: 31.2 G/DL (ref 32–37)
MCV RBC AUTO: 84.6 FL (ref 82–101)
MONOCYTES # BLD: 0.9 10^3/UL (ref 0.3–0.9)
MONOCYTES NFR BLD: 10.1 % (ref 0–11)
NEUTROPHILS # BLD: 6.3 10^3/UL (ref 1.6–7.5)
NEUTROPHILS NFR BLD AUTO: 68.7 % (ref 39–77)
NRBC # BLD MANUAL: 0 10^3/UL (ref 0–0)
NRBC BLD AUTO-RTO: 0 /100WBC (ref 0–0)
PLATELET # BLD: 383 10^3/UL (ref 140–415)
PMV BLD AUTO: 9.4 FL (ref 7.4–10.4)
POTASSIUM SERPL-SCNC: 3.2 MMOL/L (ref 3.5–5.1)
RBC # BLD AUTO: 3.18 10^6/UL (ref 4.2–5.4)
SODIUM SERPL-SCNC: 140 MMOL/L (ref 135–144)
WBC # BLD AUTO: 9.1 10^3/UL (ref 4.8–10.8)

## 2017-11-19 RX ADMIN — FOLIC ACID SCH MLS/HR: 5 INJECTION, SOLUTION INTRAMUSCULAR; INTRAVENOUS; SUBCUTANEOUS at 02:48

## 2017-11-19 RX ADMIN — LINEZOLID SCH MLS/HR: 600 INJECTION, SOLUTION INTRAVENOUS at 17:22

## 2017-11-19 RX ADMIN — FOLIC ACID SCH MG: 1 TABLET ORAL at 09:09

## 2017-11-19 RX ADMIN — PANTOPRAZOLE SODIUM SCH MG: 40 TABLET, DELAYED RELEASE ORAL at 06:26

## 2017-11-19 RX ADMIN — ACARBOSE SCH MG: 50 TABLET ORAL at 17:35

## 2017-11-19 RX ADMIN — FOLIC ACID SCH MLS/HR: 5 INJECTION, SOLUTION INTRAMUSCULAR; INTRAVENOUS; SUBCUTANEOUS at 06:25

## 2017-11-19 RX ADMIN — VITAMIN D, TAB 1000IU (100/BT) SCH UNIT: 25 TAB at 09:09

## 2017-11-19 RX ADMIN — EZETIMIBE SCH MG: 10 TABLET ORAL at 09:08

## 2017-11-19 RX ADMIN — LEVOTHYROXINE SODIUM SCH MCG: 50 TABLET ORAL at 06:26

## 2017-11-19 RX ADMIN — VALSARTAN SCH MG: 160 TABLET, FILM COATED ORAL at 09:09

## 2017-11-19 RX ADMIN — ACARBOSE SCH MG: 50 TABLET ORAL at 07:30

## 2017-11-19 NOTE — CONS
Date/Time of Note


Date/Time of Note


DATE: 11/19/17 


TIME: 13:13





Consultation Date/Type/Reason


Admit Date/Time





Constitutional:  No chills, No febrile


Eyes:  No discharge, No visual change


ENT:  No congestion, No sore throat


Respiratory:  no complaints


Cardiovascular:  no complaints


Gastrointestinal:  pain


Genitourinary:  dysuria, 


   No hematuria


Musculoskeletal:  no complaints


Skin:  No bruising, No erythema


Neurologic:  No focal-weakness


Psychological:  nl mood/affect





Past Medical History


Medical History:  hypertension, hypothyroid, other (Rheumatoid arthritis)





Social History


Alcohol Use:  none


Smoking Status:  Never smoker


Drug Use:  none





Exam/Review of Systems


Vital Signs


Vitals





 Vital Signs








  Date Time  Temp Pulse Resp B/P Pulse Ox O2 Delivery O2 Flow Rate FiO2


 


11/19/17 08:00 98.1 67 18 135/62 96   


 


11/16/17 16:00      Room Air  














 Intake and Output   


 


 11/18/17 11/18/17 11/19/17





 15:00 23:00 07:00


 


Intake Total  840 ml 1275 ml


 


Output Total 100 ml  20 ml


 


Balance -100 ml 840 ml 1255 ml











Results


Result Diagram:  


11/19/17 0605                                                                  

              11/19/17 0535





Results 24 hrs





Laboratory Tests








Test


  11/18/17


17:55 11/19/17


05:35 11/19/17


06:05 11/19/17


08:18


 


Bedside Glucose 115     106  


 


Sodium Level  140    


 


Potassium Level  3.2  L  


 


Chloride Level  110    


 


Carbon Dioxide Level  27    


 


Anion Gap  6  L  


 


Blood Urea Nitrogen  5  L  


 


Creatinine  0.51    


 


Glucose Level  102    


 


Calcium Level  8.1  L  


 


White Blood Count   9.1   


 


Red Blood Count   3.18  L 


 


Hemoglobin   8.4  L 


 


Hematocrit   26.9  L 


 


Mean Corpuscular Volume   84.6   


 


Mean Corpuscular Hemoglobin   26.4  L 


 


Mean Corpuscular Hemoglobin


Concent 


  


  31.2  L


  


 


 


Red Cell Distribution Width   16.6  H 


 


Platelet Count   383   


 


Mean Platelet Volume   9.4   


 


Neutrophils %   68.7   


 


Lymphocytes %   18.0   


 


Monocytes %   10.1   


 


Eosinophils %   1.9   


 


Basophils %   0.7   


 


Nucleated Red Blood Cells %   0.0   


 


Neutrophils #   6.3   


 


Lymphocytes #   1.6   


 


Monocytes #   0.9   


 


Eosinophils #   0.2   


 


Basophils #   0.1   


 


Nucleated Red Blood Cells #   0.0   














Test


  11/19/17


12:21 


  


  


 


 


Bedside Glucose 116     











Medications


Medications





 Current Medications


Meropenem/Sodium Chloride (Merrem 500mg/50 ml(Pmx)) 50 ml @  100 mls/hr Q8 IVPB

  Last administered on 11/19/17at 06:25; Admin Dose 100 MLS/HR;  Start 11/16/17 

at 22:00


Hydralazine HCl (Apresoline) 10 mg Q6H  PRN IV SBP>170;  Start 11/17/17 at 11:30


Morphine Sulfate (morphine) 1 mg Q4H  PRN IV PAIN Last administered on 11/17/ 17at 21:00; Admin Dose 1 MG;  Start 11/17/17 at 12:30


Ondansetron HCl (Zofran Inj) 4 mg Q4H  PRN IV NAUSEA AND/OR VOMITING Last 

administered on 11/18/17at 05:21; Admin Dose 4 MG;  Start 11/17/17 at 13:00


Acetaminophen (Tylenol Tab) 650 mg Q4H  PRN PO PAIN AND OR ELEVATED TEMP Last 

administered on 11/18/17at 23:15; Admin Dose 650 MG;  Start 11/17/17 at 13:00


Pantoprazole (Protonix Tab) 40 mg DAILY@06 PO  Last administered on 11/19/17at 

06:26; Admin Dose 40 MG;  Start 11/18/17 at 06:00


Al Hydrox/Mg Hydrox/Simethicone (Mag-Al Plus) 30 ml Q6H  PRN PO 

GASTROINTESTINAL UPSET;  Start 11/17/17 at 20:00


Folic Acid (Folic Acid) 1 mg DAILY PO  Last administered on 11/19/17at 09:09; 

Admin Dose 1 MG;  Start 11/18/17 at 10:00


Levothyroxine Sodium (Synthroid) 50 mcg DAILY@06 PO  Last administered on 11/19/ 17at 06:26; Admin Dose 50 MCG;  Start 11/19/17 at 06:00


Valsartan (Diovan) 160 mg DAILY PO  Last administered on 11/19/17at 09:09; 

Admin Dose 160 MG;  Start 11/18/17 at 10:00


EZETIMIBE (Zetia) 10 mg DAILY PO  Last administered on 11/19/17at 09:08; Admin 

Dose 10 MG;  Start 11/18/17 at 10:00


Ibuprofen 800 mg 800 mg Q6H  PRN PO PAIN;  Start 11/18/17 at 10:00


Sodium Chloride (NS) 1,000 ml @  70 mls/hr [id number] IV  Last administered on 11/ 19/17at 06:25; Admin Dose 70 MLS/HR;  Start 11/18/17 at 12:30


Tramadol HCl (Ultram) 50 mg Q6H  PRN PO PAIN LEVEL 7-10;  Start 11/18/17 at 12:

30


Cholecalciferol (Vitamin D) 4,000 unit DAILY PO  Last administered on 11/19/ 17at 09:08; Admin Dose 4,000 UNIT;  Start 11/19/17 at 09:00


Cholecalciferol (Vitamin D) 1,000 unit DAILY PO  Last administered on 11/19/ 17at 09:09; Admin Dose 1,000 UNIT;  Start 11/19/17 at 09:00











FRANCES FARLEY MD Nov 19, 2017 13:13

## 2017-11-19 NOTE — PN
Date/Time of Note


Date/Time of Note


DATE: 11/19/17 


TIME: 18:33





Assessment/Plan


Lines/Catheters


IV Catheter Type (from Nrs):  Peripheral IV


Riojas in Place (from Nrs):  No





Assessment/Plan


Chief Complaint/Hosp Course


1.  Gallbladder fossa abscess/bile leak.  +HIDA


-continue drain


-d/w Dr. Knapp for ERCP and ? stent


-abx


-pain management


2. Leukocytosis: likely 2/2 #1.  Resolved


-as above


3. Elevated alk phos: likely 2/2 #1


-as above


4. Diverticulosis


-diet/lifestyle optimization


3. Hiatal hernia: asymptomatic


-monitor


4. Atherosclerosis


-diet/exercise optimization





Thank you,


Problems:  





Subjective


24 Hr Interval Summary


HIDA with leak but patent cbd.  Leukocytosis resolved.  Drain color dark green/

brown.  Min tenderness at drain site.  No fevers, chills, sob, congested cough, 

cp, palpitations, ha, dizziness, n/v/d/dysuria.





Exam/Review of Systems


Vital Signs


Vitals





 Vital Signs








  Date Time  Temp Pulse Resp B/P Pulse Ox O2 Delivery O2 Flow Rate FiO2


 


11/19/17 08:00 98.1 67 18 135/62 96   


 


11/16/17 16:00      Room Air  














 Intake and Output   


 


 11/18/17 11/18/17 11/19/17





 15:00 23:00 07:00


 


Intake Total  840 ml 1275 ml


 


Output Total 100 ml  20 ml


 


Balance -100 ml 840 ml 1255 ml











Exam


Free Text/Dictation


Constitutional:  alert, oriented


Psych:  nl mood/affect


Head:  atraumatic, normocephalic


Eyes:  nl lids, nl sclera


ENMT:  mucosa pink and moist, nl nasal mucosa & septum


Neck:  non-tender, supple


Respiratory:  clear to auscultation, normal air movement


Cardiovascular:  nl pulses, regular rate and rhythm


Gastrointestinal:  min-tender at drain site, soft, surgical scars (dry without 

drainage/bruising/discoloration), drain with dark green/brown fluid


Musculoskeletal:  nl extremities to inspection, nl gait and stance


Extremities:  normal pulses


Neurological:  nl mental status, nl speech, nl strength


Skin:  nl turgor, rash or lesions


Lymph:  nl lymph nodes





Results


Result Diagram:  


11/19/17 0605                                                                  

              11/19/17 0535














LIANET GROVER MD Nov 19, 2017 18:34

## 2017-11-19 NOTE — PN
Date/Time of Note


Date/Time of Note


DATE: 11/19/17 


TIME: 12:44





Assessment/Plan


VTE Prophylaxis


VTE Prophylaxis Intervention:  other





Lines/Catheters


IV Catheter Type (from [facility]):  Peripheral IV


Urinary Cath still in place:  No





Assessment/Plan


Assessment/Plan


- hypokalemia- replace K, am BMP


- VRE Wound


   - ID consult- Dr Pham notified


   - contact isolation


-S/p CT guided abscess drainage- 11/17/2017Min tenderness at site.


- Inadequate po fluid intake-  cont  IVF


-S/p  laparoscopic cholecystectomy 10/26/17.


-Status post ERCP with stent placement status post subsequent stent removal


-Hypertension


-Hypothyroidism


-RA





Further recommendations based on clinical course.  Plan of care discussed with 

Dr. Brooks





Subjective


24 Hr Interval Summary


Free Text/Dictation


sitting up in chair, seems comfortable


- VRE wound- Dr Pham notified


- talked to her daughter [place]- all Qs answered- dw staff


Respiratory:  no complaints


Cardiovascular:  no complaints


Gastrointestinal:  pain


Musculoskeletal:  no complaints





Exam/Review of Systems


Vital Signs


Vitals





 Vital Signs








  Date Time  Temp Pulse Resp B/P Pulse Ox O2 Delivery O2 Flow Rate FiO2


 


11/19/17 08:00 98.1 67 18 135/62 96   


 


11/16/17 16:00      Room Air  














 Intake and Output   


 


 11/18/17 11/18/17 11/19/17





 15:00 23:00 07:00


 


Intake Total  840 ml 1275 ml


 


Output Total 100 ml  20 ml


 


Balance -100 ml 840 ml 1255 ml











Exam


Constitutional:  alert, well developed


Respiratory:  clear to auscultation, diminished breath sounds


Cardiovascular:  nl pulses, other (s1s2)


Gastrointestinal:  other (RUQ drain ), soft, tender


Musculoskeletal:  nl extremities to inspection


Extremities:  normal pulses


Neurological:  nl mental status, nl speech





Results


Result Diagram:  


11/19/17 0605                                                                  

              11/19/17 0535





Results 24 hrs





Laboratory Tests








Test


  11/18/17


12:45 11/18/17


17:55 11/19/17


05:35 11/19/17


06:05


 


Total Bilirubin 0.3     


 


Direct Bilirubin 0.00     


 


Indirect Bilirubin 0.3     


 


Aspartate Amino Transf


(AST/SGOT) 20  


  


  


  


 


 


Alanine Aminotransferase


(ALT/SGPT) 31  


  


  


  


 


 


Alkaline Phosphatase 142  H   


 


Total Protein 7.1     


 


Albumin 3.5     


 


Bedside Glucose  115    


 


Sodium Level   140   


 


Potassium Level   3.2  L 


 


Chloride Level   110   


 


Carbon Dioxide Level   27   


 


Anion Gap   6  L 


 


Blood Urea Nitrogen   5  L 


 


Creatinine   0.51   


 


Glucose Level   102   


 


Calcium Level   8.1  L 


 


White Blood Count    9.1  


 


Red Blood Count    3.18  L


 


Hemoglobin    8.4  L


 


Hematocrit    26.9  L


 


Mean Corpuscular Volume    84.6  


 


Mean Corpuscular Hemoglobin    26.4  L


 


Mean Corpuscular Hemoglobin


Concent 


  


  


  31.2  L


 


 


Red Cell Distribution Width    16.6  H


 


Platelet Count    383  


 


Mean Platelet Volume    9.4  


 


Neutrophils %    68.7  


 


Lymphocytes %    18.0  


 


Monocytes %    10.1  


 


Eosinophils %    1.9  


 


Basophils %    0.7  


 


Nucleated Red Blood Cells %    0.0  


 


Neutrophils #    6.3  


 


Lymphocytes #    1.6  


 


Monocytes #    0.9  


 


Eosinophils #    0.2  


 


Basophils #    0.1  


 


Nucleated Red Blood Cells #    0.0  














Test


  11/19/17


08:18 11/19/17


12:21 


  


 


 


Bedside Glucose 106   116    











Medications


Medications





 Current Medications


Meropenem/Sodium Chloride (Merrem 500mg/50 ml(Pmx)) 50 ml @  100 mls/hr Q8 IVPB

  Last administered on 11/19/17at 06:25; Admin Dose 100 MLS/HR;  Start 11/16/17 

at 22:00


Hydralazine HCl (Apresoline) 10 mg Q6H  PRN IV SBP>170;  Start 11/17/17 at 11:30


Morphine Sulfate (morphine) 1 mg Q4H  PRN IV PAIN Last administered on 11/17/ 17at 21:00; Admin Dose 1 MG;  Start 11/17/17 at 12:30


Ondansetron HCl (Zofran Inj) 4 mg Q4H  PRN IV NAUSEA AND/OR VOMITING Last 

administered on 11/18/17at 05:21; Admin Dose 4 MG;  Start 11/17/17 at 13:00


Acetaminophen (Tylenol Tab) 650 mg Q4H  PRN PO PAIN AND OR ELEVATED TEMP Last 

administered on 11/18/17at 23:15; Admin Dose 650 MG;  Start 11/17/17 at 13:00


Pantoprazole (Protonix Tab) 40 mg DAILY@06 PO  Last administered on 11/19/17at 

06:26; Admin Dose 40 MG;  Start 11/18/17 at 06:00


Al Hydrox/Mg Hydrox/Simethicone (Mag-Al Plus) 30 ml Q6H  PRN PO 

GASTROINTESTINAL UPSET;  Start 11/17/17 at 20:00


Folic Acid (Folic Acid) 1 mg DAILY PO  Last administered on 11/19/17at 09:09; 

Admin Dose 1 MG;  Start 11/18/17 at 10:00


Levothyroxine Sodium (Synthroid) 50 mcg DAILY@06 PO  Last administered on 11/19/ 17at 06:26; Admin Dose 50 MCG;  Start 11/19/17 at 06:00


Valsartan (Diovan) 160 mg DAILY PO  Last administered on 11/19/17at 09:09; 

Admin Dose 160 MG;  Start 11/18/17 at 10:00


EZETIMIBE (Zetia) 10 mg DAILY PO  Last administered on 11/19/17at 09:08; Admin 

Dose 10 MG;  Start 11/18/17 at 10:00


Ibuprofen 800 mg 800 mg Q6H  PRN PO PAIN;  Start 11/18/17 at 10:00


Sodium Chloride (NS) 1,000 ml @  70 mls/hr [id number] IV  Last administered on 11/ 19/17at 06:25; Admin Dose 70 MLS/HR;  Start 11/18/17 at 12:30


Tramadol HCl (Ultram) 50 mg Q6H  PRN PO PAIN LEVEL 7-10;  Start 11/18/17 at 12:

30


Cholecalciferol (Vitamin D) 4,000 unit DAILY PO  Last administered on 11/19/ 17at 09:08; Admin Dose 4,000 UNIT;  Start 11/19/17 at 09:00


Cholecalciferol (Vitamin D) 1,000 unit DAILY PO  Last administered on 11/19/ 17at 09:09; Admin Dose 1,000 UNIT;  Start 11/19/17 at 09:00











CRISPIN RAMIREZ Nov 19, 2017 12:48

## 2017-11-20 VITALS — DIASTOLIC BLOOD PRESSURE: 76 MMHG | SYSTOLIC BLOOD PRESSURE: 177 MMHG | RESPIRATION RATE: 19 BRPM

## 2017-11-20 VITALS — SYSTOLIC BLOOD PRESSURE: 124 MMHG | RESPIRATION RATE: 20 BRPM | DIASTOLIC BLOOD PRESSURE: 80 MMHG

## 2017-11-20 VITALS — DIASTOLIC BLOOD PRESSURE: 77 MMHG | SYSTOLIC BLOOD PRESSURE: 171 MMHG | RESPIRATION RATE: 18 BRPM

## 2017-11-20 VITALS — RESPIRATION RATE: 16 BRPM | DIASTOLIC BLOOD PRESSURE: 65 MMHG | SYSTOLIC BLOOD PRESSURE: 144 MMHG

## 2017-11-20 LAB
ANION GAP SERPL CALC-SCNC: 7 MMOL/L (ref 8–16)
BASOPHILS # BLD AUTO: 0.1 10^3/UL (ref 0–0.1)
BASOPHILS NFR BLD: 0.6 % (ref 0–2)
BUN SERPL-MCNC: 3 MG/DL (ref 7–20)
CALCIUM SERPL-MCNC: 8.1 MG/DL (ref 8.4–10.2)
CHLORIDE SERPL-SCNC: 108 MMOL/L (ref 97–110)
CO2 SERPL-SCNC: 27 MMOL/L (ref 21–31)
CREAT SERPL-MCNC: 0.49 MG/DL (ref 0.44–1)
EOSINOPHIL # BLD: 0.2 10^3/UL (ref 0–0.5)
EOSINOPHIL NFR BLD: 2.2 % (ref 0–7)
ERYTHROCYTE [DISTWIDTH] IN BLOOD BY AUTOMATED COUNT: 16.6 % (ref 11.5–14.5)
GLUCOSE SERPL-MCNC: 113 MG/DL (ref 70–220)
HCT VFR BLD CALC: 25.9 % (ref 37–47)
HGB BLD-MCNC: 8.3 G/DL (ref 12–16)
LYMPHOCYTES # BLD AUTO: 1.6 10^3/UL (ref 0.8–2.9)
LYMPHOCYTES NFR BLD AUTO: 15.5 % (ref 15–51)
MCH RBC QN AUTO: 26.9 PG (ref 29–33)
MCHC RBC AUTO-ENTMCNC: 32 G/DL (ref 32–37)
MCV RBC AUTO: 83.8 FL (ref 82–101)
MONOCYTES # BLD: 0.8 10^3/UL (ref 0.3–0.9)
MONOCYTES NFR BLD: 8.2 % (ref 0–11)
NEUTROPHILS # BLD: 7.4 10^3/UL (ref 1.6–7.5)
NEUTROPHILS NFR BLD AUTO: 72.9 % (ref 39–77)
NRBC # BLD MANUAL: 0 10^3/UL (ref 0–0)
NRBC BLD AUTO-RTO: 0 /100WBC (ref 0–0)
PLATELET # BLD: 413 10^3/UL (ref 140–415)
PMV BLD AUTO: 9.4 FL (ref 7.4–10.4)
POTASSIUM SERPL-SCNC: 3.7 MMOL/L (ref 3.5–5.1)
RBC # BLD AUTO: 3.09 10^6/UL (ref 4.2–5.4)
SODIUM SERPL-SCNC: 138 MMOL/L (ref 135–144)
WBC # BLD AUTO: 10.2 10^3/UL (ref 4.8–10.8)

## 2017-11-20 PROCEDURE — 0F758DZ DILATION OF RIGHT HEPATIC DUCT WITH INTRALUMINAL DEVICE, VIA NATURAL OR ARTIFICIAL OPENING ENDOSCOPIC: ICD-10-PCS | Performed by: INTERNAL MEDICINE

## 2017-11-20 PROCEDURE — 0F768DZ DILATION OF LEFT HEPATIC DUCT WITH INTRALUMINAL DEVICE, VIA NATURAL OR ARTIFICIAL OPENING ENDOSCOPIC: ICD-10-PCS | Performed by: INTERNAL MEDICINE

## 2017-11-20 RX ADMIN — Medication SCH MLS/HR: at 22:46

## 2017-11-20 RX ADMIN — PANTOPRAZOLE SODIUM SCH MG: 40 TABLET, DELAYED RELEASE ORAL at 06:00

## 2017-11-20 RX ADMIN — FOLIC ACID SCH MG: 1 TABLET ORAL at 08:03

## 2017-11-20 RX ADMIN — FOLIC ACID SCH MLS/HR: 5 INJECTION, SOLUTION INTRAMUSCULAR; INTRAVENOUS; SUBCUTANEOUS at 21:42

## 2017-11-20 RX ADMIN — DOCUSATE SODIUM AND SENNOSIDES SCH TAB: 8.6; 5 TABLET, FILM COATED ORAL at 21:34

## 2017-11-20 RX ADMIN — FOLIC ACID SCH MLS/HR: 5 INJECTION, SOLUTION INTRAMUSCULAR; INTRAVENOUS; SUBCUTANEOUS at 01:55

## 2017-11-20 RX ADMIN — EZETIMIBE SCH MG: 10 TABLET ORAL at 08:04

## 2017-11-20 RX ADMIN — FOLIC ACID SCH MLS/HR: 5 INJECTION, SOLUTION INTRAMUSCULAR; INTRAVENOUS; SUBCUTANEOUS at 10:52

## 2017-11-20 RX ADMIN — ACARBOSE SCH MG: 50 TABLET ORAL at 07:30

## 2017-11-20 RX ADMIN — LEVOTHYROXINE SODIUM SCH MCG: 50 TABLET ORAL at 06:14

## 2017-11-20 RX ADMIN — ACARBOSE SCH MG: 50 TABLET ORAL at 16:08

## 2017-11-20 RX ADMIN — DEXAMETHASONE SODIUM PHOSPHATE PRN MG: 10 INJECTION, SOLUTION INTRAMUSCULAR; INTRAVENOUS at 10:24

## 2017-11-20 RX ADMIN — VITAMIN D, TAB 1000IU (100/BT) SCH UNIT: 25 TAB at 08:03

## 2017-11-20 RX ADMIN — FOLIC ACID SCH MLS/HR: 5 INJECTION, SOLUTION INTRAMUSCULAR; INTRAVENOUS; SUBCUTANEOUS at 21:40

## 2017-11-20 RX ADMIN — LINEZOLID SCH MLS/HR: 600 INJECTION, SOLUTION INTRAVENOUS at 06:23

## 2017-11-20 RX ADMIN — VALSARTAN SCH MG: 160 TABLET, FILM COATED ORAL at 08:03

## 2017-11-20 RX ADMIN — LINEZOLID SCH MLS/HR: 600 INJECTION, SOLUTION INTRAVENOUS at 16:03

## 2017-11-20 NOTE — PN
Date/Time of Note


Date/Time of Note


DATE: 11/20/17 


TIME: 12:26





Assessment/Plan


VTE Prophylaxis


VTE Prophylaxis Intervention:  other





Lines/Catheters


IV Catheter Type (from [facility]):  Peripheral IV


Urinary Cath still in place:  No





Assessment/Plan


Assessment/Plan


-S/p CT guided abscess drainage- 11/17/2017Min tenderness at site.


   - ERCP today


- hypokalemia- reSOLVED


- VRE Wound


   -per ID- Dr Pham 


   - contact isolation


- Inadequate po fluid intake-  cont  IVF


-S/p  laparoscopic cholecystectomy 10/26/17.


-Status post ERCP with stent placement status post subsequent stent removal


-Hypertension


-Hypothyroidism


-RA





Further recommendations based on clinical course.  Plan of care discussed with 

Dr. Brooks





Subjective


24 Hr Interval Summary


Free Text/Dictation


- Schedule for ERCP today, daughter at the bedside all questions answered- 


- VRE wound ID follow-up-


-discussed with staff


Respiratory:  no complaints


Cardiovascular:  no complaints


Gastrointestinal:  blood


Genitourinary:  no complaints


Musculoskeletal:  no complaints





Exam/Review of Systems


Vital Signs


Vitals





 Vital Signs








  Date Time  Temp Pulse Resp B/P Pulse Ox O2 Delivery O2 Flow Rate FiO2


 


11/20/17 07:57 97.8 65 18 171/77 98   


 


11/16/17 16:00      Room Air  














 Intake and Output   


 


 11/19/17 11/19/17 11/20/17





 15:00 23:00 07:00


 


Intake Total 50 ml 1560 ml 1020 ml


 


Output Total  40 ml 


 


Balance 50 ml 1520 ml 1020 ml











Exam


Constitutional:  alert, well developed


Respiratory:  diminished breath sounds, normal air movement


Cardiovascular:  nl pulses, other


Gastrointestinal:  soft, tender


Musculoskeletal:  nl extremities to inspection


Extremities:  normal pulses


Neurological:  nl mental status, nl speech





Results


Result Diagram:  


11/20/17 0519 11/20/17 0519





Results 24 hrs





Laboratory Tests








Test


  11/19/17


17:44 11/19/17


22:01 11/20/17


05:19 11/20/17


07:54


 


Bedside Glucose 111   123    142  


 


White Blood Count   10.2   


 


Red Blood Count   3.09  L 


 


Hemoglobin   8.3  L 


 


Hematocrit   25.9  L 


 


Mean Corpuscular Volume   83.8   


 


Mean Corpuscular Hemoglobin   26.9  L 


 


Mean Corpuscular Hemoglobin


Concent 


  


  32.0  


  


 


 


Red Cell Distribution Width   16.6  H 


 


Platelet Count   413   


 


Mean Platelet Volume   9.4   


 


Neutrophils %   72.9   


 


Lymphocytes %   15.5   


 


Monocytes %   8.2   


 


Eosinophils %   2.2   


 


Basophils %   0.6   


 


Nucleated Red Blood Cells %   0.0   


 


Neutrophils #   7.4   


 


Lymphocytes #   1.6   


 


Monocytes #   0.8   


 


Eosinophils #   0.2   


 


Basophils #   0.1   


 


Nucleated Red Blood Cells #   0.0   


 


Sodium Level   138   


 


Potassium Level   3.7   


 


Chloride Level   108   


 


Carbon Dioxide Level   27   


 


Anion Gap   7  L 


 


Blood Urea Nitrogen   3  L 


 


Creatinine   0.49   


 


Glucose Level   113   


 


Calcium Level   8.1  L 














Test


  11/20/17


11:00 


  


  


 


 


Bedside Glucose 96     











Medications


Medications





 Current Medications


Meropenem/Sodium Chloride (Merrem 500mg/50 ml(Pmx)) 50 ml @  100 mls/hr Q8 IVPB

  Last administered on 11/20/17at 06:13; Admin Dose 100 MLS/HR;  Start 11/16/17 

at 22:00


Hydralazine HCl (Apresoline) 10 mg Q6H  PRN IV SBP>170;  Start 11/17/17 at 11:30


Morphine Sulfate (morphine) 1 mg Q4H  PRN IV PAIN Last administered on 11/17/ 17at 21:00; Admin Dose 1 MG;  Start 11/17/17 at 12:30


Ondansetron HCl (Zofran Inj) 4 mg Q4H  PRN IV NAUSEA AND/OR VOMITING Last 

administered on 11/20/17at 10:24; Admin Dose 4 MG;  Start 11/17/17 at 13:00


Acetaminophen (Tylenol Tab) 650 mg Q4H  PRN PO PAIN AND OR ELEVATED TEMP Last 

administered on 11/20/17at 02:37; Admin Dose 650 MG;  Start 11/17/17 at 13:00


Pantoprazole (Protonix Tab) 40 mg DAILY@06 PO  Last administered on 11/19/17at 

06:26; Admin Dose 40 MG;  Start 11/18/17 at 06:00


Al Hydrox/Mg Hydrox/Simethicone (Mag-Al Plus) 30 ml Q6H  PRN PO 

GASTROINTESTINAL UPSET;  Start 11/17/17 at 20:00


Folic Acid (Folic Acid) 1 mg DAILY PO  Last administered on 11/19/17at 09:09; 

Admin Dose 1 MG;  Start 11/18/17 at 10:00


Levothyroxine Sodium (Synthroid) 50 mcg DAILY@06 PO  Last administered on 11/20/ 17at 06:14; Admin Dose 50 MCG;  Start 11/19/17 at 06:00


Valsartan (Diovan) 160 mg DAILY PO  Last administered on 11/20/17at 08:03; 

Admin Dose 160 MG;  Start 11/18/17 at 10:00


EZETIMIBE (Zetia) 10 mg DAILY PO  Last administered on 11/19/17at 09:08; Admin 

Dose 10 MG;  Start 11/18/17 at 10:00


Ibuprofen 800 mg 800 mg Q6H  PRN PO PAIN;  Start 11/18/17 at 10:00


Sodium Chloride (NS) 1,000 ml @  70 mls/hr [id number] IV  Last administered on 11/ 20/17at 01:55; Admin Dose 70 MLS/HR;  Start 11/18/17 at 12:30


Tramadol HCl (Ultram) 50 mg Q6H  PRN PO PAIN LEVEL 7-10 Last administered on 11/ 19/17at 13:46; Admin Dose 50 MG;  Start 11/18/17 at 12:30;  Status Future Hold


Cholecalciferol (Vitamin D) 4,000 unit DAILY PO  Last administered on 11/19/ 17at 09:08; Admin Dose 4,000 UNIT;  Start 11/19/17 at 09:00


Cholecalciferol 1000 unit 1,000 unit DAILY PO  Last administered on 11/19/17at 

09:09; Admin Dose 1,000 UNIT;  Start 11/19/17 at 09:00


Linezolid 300 ml @  200 mls/hr Q12H IVPB  Last administered on 11/20/17at 06:23

; Admin Dose 200 MLS/HR;  Start 11/19/17 at 17:00


Dextrose/Sodium Chloride (D5-1/2ns) 1,000 ml @  70 mls/hr [id number] IV  Last 

administered on 11/20/17at 10:52; Admin Dose 70 MLS/HR;  Start 11/20/17 at 11:00











CRISPIN RAMIREZ Nov 20, 2017 12:30

## 2017-11-20 NOTE — PN
Date/Time of Note


Date/Time of Note


DATE: 11/20/17 


TIME: 12:25





Assessment/Plan


Lines/Catheters


IV Catheter Type (from Nrs):  Peripheral IV


Riojas in Place (from Nrs):  No





Assessment/Plan


Chief Complaint/Hosp Course


1.  Gallbladder fossa abscess/bile leak.  +HIDA.  ERCP today.  I had a long d/w 

patient and daughter regarding the leak, possible source, and treatment plan.  

They both understand.


-continue drain


-abx


-pain management


2. Leukocytosis: likely 2/2 #1.  Resolved


-as above


3. Elevated alk phos: likely 2/2 #1


-as above


4. Diverticulosis


-diet/lifestyle optimization


3. Hiatal hernia: asymptomatic


-monitor


4. Atherosclerosis


-diet/exercise optimization





Thank you,


Problems:  





Subjective


24 Hr Interval Summary


Drain 120>40>20/day.  HIDA with leak but patent cbd.  ERCP this afternoon by 

Dr. Knapp.  Min tenderness at drain site.  No fevers, chills, sob, congested 

cough, cp, palpitations, ha, dizziness, n/v/d/dysuria.





Exam/Review of Systems


Vital Signs


Vitals





 Vital Signs








  Date Time  Temp Pulse Resp B/P Pulse Ox O2 Delivery O2 Flow Rate FiO2


 


11/20/17 07:57 97.8 65 18 171/77 98   


 


11/16/17 16:00      Room Air  














 Intake and Output   


 


 11/19/17 11/19/17 11/20/17





 15:00 23:00 07:00


 


Intake Total 50 ml 1560 ml 1020 ml


 


Output Total  40 ml 


 


Balance 50 ml 1520 ml 1020 ml











Exam


Free Text/Dictation


Constitutional:  alert, oriented


Psych:  nl mood/affect


Head:  atraumatic, normocephalic


Eyes:  nl lids, nl sclera


ENMT:  mucosa pink and moist, nl nasal mucosa & septum


Neck:  non-tender, supple


Respiratory:  clear to auscultation, normal air movement


Cardiovascular:  nl pulses, regular rate and rhythm


Gastrointestinal:  min-tender at drain site, soft, surgical scars (dry without 

drainage/bruising/discoloration), drain with dark green/brown fluid (min)


Musculoskeletal:  nl extremities to inspection, nl gait and stance


Extremities:  normal pulses


Neurological:  nl mental status, nl speech, nl strength


Skin:  nl turgor, rash or lesions


Lymph:  nl lymph nodes





Results


Result Diagram:  


11/20/17 0519                                                                  

              11/20/17 0519














LIANET GROVER MD Nov 20, 2017 12:28

## 2017-11-20 NOTE — OPR
Date/Time of Note


Date/Time of Note


DATE: 11/20/17 


TIME: 20:12





Operative Report


Preoperative Diagnosis


biliary leak


Postoperative Diagnosis


biliary leak prob from branch of rt hepatic duct


10/7 cbd placed jason to rt hepatic duct   10/9 stent placed in to lft hepatic 

duct


Operation/Procedure Performed


ercp antwon hepatic ducts stents placement


Surgeon


see signature line


Assistant


none


Anesthesia Type:  general


Anesthesiologist:  GUME FAULKNER


Estimated Blood Loss:  none


Transfusion


   none


Specimen


none


Grafts/Implants


none


Tubes/Drains


2 cbd stents placed


Complications


none


Pt Condition Post Procedure:  stable


Disposition:  PACU


Indications


biliary leak


Procedure Description


ercp performed 


poss leak of branch of rt hepatic duct  seen   one stent placed in to rt hep 

duct  one stent placed in to lft hep duvt











LILLIAM CRENSHAW MD Nov 20, 2017 20:23

## 2017-11-20 NOTE — CONS
Date/Time of Note


Date/Time of Note


DATE: 11/20/17 


TIME: 15:09





Assessment/Plan


Assessment/Plan


Chief Complaint/Hosp Course


1. Gall bladder fossa abscess with VRE and Bacteroides s/p drainage


2. Hx of HTN and hypothy


3. hx of pcn allergy; tolerated Meropenem





R:


Linezolid/Metronidazole x 14 days at least


repeat imaging prior to cessation of abx


will follow closely with you.


Problems:  





Consultation Date/Type/Reason


Admit Date/Time





Date of Consultation:  Nov 20, 2017


Type of Consultation:  id


Reason for Consultation


abx recs


Referring Provider:  VIOLETTE GALDAMEZ MD





Hx of Present Illness


81 yo female with apparent pcn allergy, s/p Cholecystectomy at tend of October 2017, admitted for post op fever and leukocytosis accompanied by n/v/d. Imaging 

studies in er revealed a gallbladder fossa abscess. She is s/p Ct guided 

drainage and HIDA scan. Her recent cxs have revealed VRE and Bacteroides. She 

was originally empirically on Meropenem which she tolerted well.


Constitutional:  requiring IVF


Eyes:  No discharge, No visual change


ENT:  No congestion, No sore throat


Respiratory:  no complaints


Cardiovascular:  no complaints


Gastrointestinal:  blood


Genitourinary:  no complaints


Musculoskeletal:  no complaints


Skin:  No bruising, No erythema


Neurologic:  No focal-weakness


Psychological:  nl mood/affect





Past Medical History


Medical History:  hypertension, hypothyroid, other (Rheumatoid arthritis)





Social History


Alcohol Use:  none


Smoking Status:  Never smoker


Drug Use:  none





Exam/Review of Systems


Vital Signs


Vitals





 Vital Signs








  Date Time  Temp Pulse Resp B/P Pulse Ox O2 Delivery O2 Flow Rate FiO2


 


11/20/17 15:04 98.3 94 20 124/80 99   


 


11/16/17 16:00      Room Air  














 Intake and Output   


 


 11/19/17 11/19/17 11/20/17





 14:59 22:59 06:59


 


Intake Total 100 ml 1560 ml 910 ml


 


Output Total  40 ml 


 


Balance 100 ml 1520 ml 910 ml











Exam


Constitutional:  alert, oriented, well developed


Psych:  nl mood/affect, no complaints


Eyes:  EOMI, PERRL, nl conjunctiva, nl lids, nl sclera


Respiratory:  clear to auscultation, normal air movement


Cardiovascular:  nl pulses, regular rate and rhythm


Gastrointestinal:  nl liver, spleen, non-tender, other (drain in place;

serosanguinous ), soft





Results


Result Diagram:  


11/20/17 0519                                                                  

              11/20/17 0519





Results 24 hrs





Laboratory Tests








Test


  11/19/17


17:44 11/19/17


22:01 11/20/17


05:19 11/20/17


07:54


 


Bedside Glucose 111   123    142  


 


White Blood Count   10.2   


 


Red Blood Count   3.09  L 


 


Hemoglobin   8.3  L 


 


Hematocrit   25.9  L 


 


Mean Corpuscular Volume   83.8   


 


Mean Corpuscular Hemoglobin   26.9  L 


 


Mean Corpuscular Hemoglobin


Concent 


  


  32.0  


  


 


 


Red Cell Distribution Width   16.6  H 


 


Platelet Count   413   


 


Mean Platelet Volume   9.4   


 


Neutrophils %   72.9   


 


Lymphocytes %   15.5   


 


Monocytes %   8.2   


 


Eosinophils %   2.2   


 


Basophils %   0.6   


 


Nucleated Red Blood Cells %   0.0   


 


Neutrophils #   7.4   


 


Lymphocytes #   1.6   


 


Monocytes #   0.8   


 


Eosinophils #   0.2   


 


Basophils #   0.1   


 


Nucleated Red Blood Cells #   0.0   


 


Sodium Level   138   


 


Potassium Level   3.7   


 


Chloride Level   108   


 


Carbon Dioxide Level   27   


 


Anion Gap   7  L 


 


Blood Urea Nitrogen   3  L 


 


Creatinine   0.49   


 


Glucose Level   113   


 


Calcium Level   8.1  L 














Test


  11/20/17


11:00 


  


  


 


 


Bedside Glucose 96     











Medications


Medications





 Current Medications


Meropenem/Sodium Chloride (Merrem 500mg/50 ml(Pmx)) 50 ml @  100 mls/hr Q8 IVPB

  Last administered on 11/20/17at 13:41; Admin Dose 100 MLS/HR;  Start 11/16/17 

at 22:00


Hydralazine HCl (Apresoline) 10 mg Q6H  PRN IV SBP>170;  Start 11/17/17 at 11:30


Morphine Sulfate (morphine) 1 mg Q4H  PRN IV PAIN Last administered on 11/17/ 17at 21:00; Admin Dose 1 MG;  Start 11/17/17 at 12:30


Ondansetron HCl (Zofran Inj) 4 mg Q4H  PRN IV NAUSEA AND/OR VOMITING Last 

administered on 11/20/17at 10:24; Admin Dose 4 MG;  Start 11/17/17 at 13:00


Acetaminophen (Tylenol Tab) 650 mg Q4H  PRN PO PAIN AND OR ELEVATED TEMP Last 

administered on 11/20/17at 02:37; Admin Dose 650 MG;  Start 11/17/17 at 13:00


Pantoprazole (Protonix Tab) 40 mg DAILY@06 PO  Last administered on 11/19/17at 

06:26; Admin Dose 40 MG;  Start 11/18/17 at 06:00


Al Hydrox/Mg Hydrox/Simethicone (Mag-Al Plus) 30 ml Q6H  PRN PO 

GASTROINTESTINAL UPSET;  Start 11/17/17 at 20:00


Folic Acid (Folic Acid) 1 mg DAILY PO  Last administered on 11/19/17at 09:09; 

Admin Dose 1 MG;  Start 11/18/17 at 10:00


Levothyroxine Sodium (Synthroid) 50 mcg DAILY@06 PO  Last administered on 11/20/ 17at 06:14; Admin Dose 50 MCG;  Start 11/19/17 at 06:00


Valsartan (Diovan) 160 mg DAILY PO  Last administered on 11/20/17at 08:03; 

Admin Dose 160 MG;  Start 11/18/17 at 10:00


EZETIMIBE (Zetia) 10 mg DAILY PO  Last administered on 11/19/17at 09:08; Admin 

Dose 10 MG;  Start 11/18/17 at 10:00


Ibuprofen 800 mg 800 mg Q6H  PRN PO PAIN;  Start 11/18/17 at 10:00


Sodium Chloride (NS) 1,000 ml @  70 mls/hr [id number] IV  Last administered on 11/ 20/17at 01:55; Admin Dose 70 MLS/HR;  Start 11/18/17 at 12:30


Tramadol HCl (Ultram) 50 mg Q6H  PRN PO PAIN LEVEL 7-10 Last administered on 11/ 19/17at 13:46; Admin Dose 50 MG;  Start 11/18/17 at 12:30;  Status Future Hold


Cholecalciferol (Vitamin D) 4,000 unit DAILY PO  Last administered on 11/19/ 17at 09:08; Admin Dose 4,000 UNIT;  Start 11/19/17 at 09:00


Cholecalciferol 1000 unit 1,000 unit DAILY PO  Last administered on 11/19/17at 

09:09; Admin Dose 1,000 UNIT;  Start 11/19/17 at 09:00


Linezolid 300 ml @  200 mls/hr Q12H IVPB  Last administered on 11/20/17at 06:23

; Admin Dose 200 MLS/HR;  Start 11/19/17 at 17:00


Dextrose/Sodium Chloride (D5-1/2ns) 1,000 ml @  70 mls/hr [id number] IV  Last 

administered on 11/20/17at 10:52; Admin Dose 70 MLS/HR;  Start 11/20/17 at 11:00











FRANCES FARLEY MD Nov 20, 2017 15:13

## 2017-11-21 VITALS — SYSTOLIC BLOOD PRESSURE: 123 MMHG | DIASTOLIC BLOOD PRESSURE: 58 MMHG | RESPIRATION RATE: 17 BRPM

## 2017-11-21 VITALS — DIASTOLIC BLOOD PRESSURE: 70 MMHG | RESPIRATION RATE: 18 BRPM | SYSTOLIC BLOOD PRESSURE: 164 MMHG

## 2017-11-21 LAB
ABNORMAL IP MESSAGE: 1
ANION GAP SERPL CALC-SCNC: 11 MMOL/L (ref 8–16)
BASOPHILS # BLD AUTO: 0 10^3/UL (ref 0–0.1)
BASOPHILS NFR BLD: 0 % (ref 0–2)
BUN SERPL-MCNC: 6 MG/DL (ref 7–20)
CALCIUM SERPL-MCNC: 7.7 MG/DL (ref 8.4–10.2)
CHLORIDE SERPL-SCNC: 108 MMOL/L (ref 97–110)
CO2 SERPL-SCNC: 24 MMOL/L (ref 21–31)
CREAT SERPL-MCNC: 0.46 MG/DL (ref 0.44–1)
EOSINOPHIL # BLD: 0 10^3/UL (ref 0–0.5)
EOSINOPHIL NFR BLD: 0 % (ref 0–7)
ERYTHROCYTE [DISTWIDTH] IN BLOOD BY AUTOMATED COUNT: 16.1 % (ref 11.5–14.5)
GLUCOSE SERPL-MCNC: 215 MG/DL (ref 70–220)
HCT VFR BLD CALC: 26.5 % (ref 37–47)
HGB BLD-MCNC: 8.5 G/DL (ref 12–16)
LYMPHOCYTES # BLD AUTO: 0.6 10^3/UL (ref 0.8–2.9)
LYMPHOCYTES NFR BLD AUTO: 7.4 % (ref 15–51)
MCH RBC QN AUTO: 26.4 PG (ref 29–33)
MCHC RBC AUTO-ENTMCNC: 32.1 G/DL (ref 32–37)
MCV RBC AUTO: 82.3 FL (ref 82–101)
MONOCYTES # BLD: 0.1 10^3/UL (ref 0.3–0.9)
MONOCYTES NFR BLD: 1.1 % (ref 0–11)
NEUTROPHILS # BLD: 6.7 10^3/UL (ref 1.6–7.5)
NEUTROPHILS NFR BLD AUTO: 91 % (ref 39–77)
NRBC # BLD MANUAL: 0 10^3/UL (ref 0–0)
NRBC BLD AUTO-RTO: 0 /100WBC (ref 0–0)
PLATELET # BLD: 424 10^3/UL (ref 140–415)
PMV BLD AUTO: 9.2 FL (ref 7.4–10.4)
POSITIVE DIFF: (no result)
POTASSIUM SERPL-SCNC: 3.6 MMOL/L (ref 3.5–5.1)
RBC # BLD AUTO: 3.22 10^6/UL (ref 4.2–5.4)
SODIUM SERPL-SCNC: 139 MMOL/L (ref 135–144)
WBC # BLD AUTO: 7.4 10^3/UL (ref 4.8–10.8)

## 2017-11-21 RX ADMIN — PANTOPRAZOLE SODIUM SCH MG: 40 TABLET, DELAYED RELEASE ORAL at 04:33

## 2017-11-21 RX ADMIN — Medication SCH MLS/HR: at 04:33

## 2017-11-21 RX ADMIN — EZETIMIBE SCH MG: 10 TABLET ORAL at 09:36

## 2017-11-21 RX ADMIN — Medication SCH MLS/HR: at 20:44

## 2017-11-21 RX ADMIN — IBUPROFEN PRN MG: 800 TABLET ORAL at 09:39

## 2017-11-21 RX ADMIN — VALSARTAN SCH MG: 160 TABLET, FILM COATED ORAL at 09:35

## 2017-11-21 RX ADMIN — VITAMIN D, TAB 1000IU (100/BT) SCH UNIT: 25 TAB at 09:34

## 2017-11-21 RX ADMIN — FOLIC ACID SCH MLS/HR: 5 INJECTION, SOLUTION INTRAMUSCULAR; INTRAVENOUS; SUBCUTANEOUS at 11:47

## 2017-11-21 RX ADMIN — Medication SCH MLS/HR: at 14:29

## 2017-11-21 RX ADMIN — ACARBOSE SCH MG: 50 TABLET ORAL at 07:49

## 2017-11-21 RX ADMIN — FOLIC ACID SCH MG: 1 TABLET ORAL at 09:35

## 2017-11-21 RX ADMIN — LINEZOLID SCH MLS/HR: 600 INJECTION, SOLUTION INTRAVENOUS at 17:31

## 2017-11-21 RX ADMIN — IBUPROFEN PRN MG: 800 TABLET ORAL at 09:35

## 2017-11-21 RX ADMIN — DOCUSATE SODIUM AND SENNOSIDES SCH TAB: 8.6; 5 TABLET, FILM COATED ORAL at 09:35

## 2017-11-21 RX ADMIN — LEVOTHYROXINE SODIUM SCH MCG: 50 TABLET ORAL at 04:33

## 2017-11-21 RX ADMIN — DOCUSATE SODIUM AND SENNOSIDES SCH TAB: 8.6; 5 TABLET, FILM COATED ORAL at 20:51

## 2017-11-21 RX ADMIN — LINEZOLID SCH MLS/HR: 600 INJECTION, SOLUTION INTRAVENOUS at 04:33

## 2017-11-21 RX ADMIN — DOCUSATE SODIUM AND SENNOSIDES SCH TAB: 8.6; 5 TABLET, FILM COATED ORAL at 20:36

## 2017-11-21 RX ADMIN — ACARBOSE SCH MG: 50 TABLET ORAL at 17:31

## 2017-11-21 NOTE — GILP
DATE OF PROCEDURE:  

 

 

NAME OF PROCEDURE:  ERCP and stent placement into the right hepatic duct and left hepatic duct.

 

PREOPERATIVE DIAGNOSIS:  Patient presenting with history of a biliary leak after the laparoscopic ch
olecystectomy as noted on the CAT scan and also as noted on the HIDA scan.

 

At this time, a biliary leak is suspected.

 

POSTOPERATIVE DIAGNOSES:  There seems to be leak of the branch of the right hepatic duct, this could
 also be leak from the cystic duct.

 

One stent placed into the right hepatic duct, 1 stent placed into the left hepatic duct.

 

DESCRIPTION OF PROCEDURE:  After the informed written consent was obtained, the patient was intubate
d by anesthesiologist, Dr. Dia.  While the patient was in the prone position, Olympus video side-vi
montalvo duodenoscope was inserted into the oropharynx, then into the esophagus and subsequently into t
he stomach and duodenum.  Ampulla was located in normal location with normal morphology.  At this ti
me, by using the Dreamtome, cannulation was performed.  When the cannulation was performed bile duct
 was visualized.  Whether there is a leak from the cystic duct or not of the contrast is not very cl
ear.  When more contrast was injected with the help of a balloon occlusion technique, the branch of 
the right hepatic duct noted to show evidence of some leakage of the contrast.  No other source of l
eakage was noted.  At this time, there was 1 wire inserted through the Dreamtome into the left hepat
ic duct and another wire left into the right hepatic duct.  A 10 x 7 [place] type of endobiliary p
rosthesis was inserted into the right hepatic duct.  Another 10 x 9 [place] type of CBD stent was 
placed into the left hepatic duct and bile seems to be flowing freely through both these ducts and a
t this time photographs were obtained, scope was withdrawn and the procedure was terminated.

 

PLAN:  Recommend watch the patient closely.

 

 

Dictated By: LILLIAM CRENSHAW MD

 

NC/NTS

DD:    11/20/2017 20:30:33

DT:    11/21/2017 05:21:15

Conf#: [id number]

DID#:  [id number]

CC: LIANET GROVER MD; VIOLETTE GALDAMEZ MD;*[facility]*

## 2017-11-21 NOTE — PN
Date/Time of Note


Date/Time of Note


DATE: 11/21/17 


TIME: 15:14





Assessment/Plan


VTE Prophylaxis


VTE Prophylaxis Intervention:  SCD's





Lines/Catheters


IV Catheter Type (from [facility]):  Peripheral IV


Urinary Cath still in place:  No





Assessment/Plan


Chief Complaint/Hosp Course


Patient's complains of poor appetite however however denies any nausea and 

vomiting


Assessment/Plan


-Gallbladder fossa abscess/bile leak. S/p drainage by radiology.  Positive HIDA 

scan. S/p ERCP and stent placement into the right hepatic duct and left hepatic 

duct on 11/20 by Dr Knapp.


-S/p  laparoscopic cholecystectomy 11/26/17.


-Status post ERCP with stent placement status post subsequent stent removal


-Hypertension


-Hypothyroidism


-RA





Further recommendations based on clinical course.  Plan of care discussed with 

Dr. Brooks


Problems:  





Exam/Review of Systems


Vital Signs


Vitals





 Vital Signs








  Date Time  Temp Pulse Resp B/P Pulse Ox O2 Delivery O2 Flow Rate FiO2


 


11/21/17 02:00 97.6 63 17 123/58 96   














 Intake and Output   


 


 11/20/17 11/20/17 11/21/17





 15:00 23:00 07:00


 


Intake Total 590 ml 1010 ml 750 ml


 


Output Total 20 ml 1910 ml 1012 ml


 


Balance 570 ml -900 ml -262 ml











Exam


Constitutional:  alert, oriented


Neck:  supple


Respiratory:  normal air movement


Cardiovascular:  nl pulses


Gastrointestinal:  other (Right upper quadrant drain), soft


Musculoskeletal:  nl extremities to inspection


Extremities:  normal pulses


Neurological:  nl mental status





Results


Result Diagram:  


11/21/17 0617                                                                  

              11/21/17 0617





Results 24 hrs





Laboratory Tests








Test


  11/20/17


16:07 11/20/17


22:45 11/21/17


06:17 11/21/17


07:47


 


Bedside Glucose 102   197    224  H


 


White Blood Count   7.4  # 


 


Red Blood Count   3.22  L 


 


Hemoglobin   8.5  L 


 


Hematocrit   26.5  L 


 


Mean Corpuscular Volume   82.3   


 


Mean Corpuscular Hemoglobin   26.4  L 


 


Mean Corpuscular Hemoglobin


Concent 


  


  32.1  


  


 


 


Red Cell Distribution Width   16.1  H 


 


Platelet Count   424  H 


 


Mean Platelet Volume   9.2   


 


Neutrophils %   91.0  H 


 


Lymphocytes %   7.4  L 


 


Monocytes %   1.1   


 


Eosinophils %   0.0   


 


Basophils %   0.0   


 


Nucleated Red Blood Cells %   0.0   


 


Neutrophils #   6.7   


 


Lymphocytes #   0.6  L 


 


Monocytes #   0.1  L 


 


Eosinophils #   0.0   


 


Basophils #   0.0   


 


Nucleated Red Blood Cells #   0.0   


 


Sodium Level   139   


 


Potassium Level   3.6   


 


Chloride Level   108   


 


Carbon Dioxide Level   24   


 


Anion Gap   11   


 


Blood Urea Nitrogen   6  L 


 


Creatinine   0.46   


 


Glucose Level   215  # 


 


Calcium Level   7.7  L 














Test


  11/21/17


11:42 


  


  


 


 


Bedside Glucose 194     











Medications


Medications





 Current Medications


Hydralazine HCl (Apresoline) 10 mg Q6H  PRN IV SBP>170;  Start 11/17/17 at 11:30


Morphine Sulfate (morphine) 1 mg Q4H  PRN IV PAIN Last administered on 11/17/ 17at 21:00; Admin Dose 1 MG;  Start 11/17/17 at 12:30


Ondansetron HCl (Zofran Inj) 4 mg Q4H  PRN IV NAUSEA AND/OR VOMITING Last 

administered on 11/20/17at 10:24; Admin Dose 4 MG;  Start 11/17/17 at 13:00


Acetaminophen (Tylenol Tab) 650 mg Q4H  PRN PO PAIN AND OR ELEVATED TEMP Last 

administered on 11/21/17at 09:42; Admin Dose 650 MG;  Start 11/17/17 at 13:00


Pantoprazole (Protonix Tab) 40 mg DAILY@06 PO  Last administered on 11/21/17at 

04:33; Admin Dose 40 MG;  Start 11/18/17 at 06:00


Al Hydrox/Mg Hydrox/Simethicone (Mag-Al Plus) 30 ml Q6H  PRN PO 

GASTROINTESTINAL UPSET;  Start 11/17/17 at 20:00


Folic Acid (Folic Acid) 1 mg DAILY PO  Last administered on 11/21/17at 09:35; 

Admin Dose 1 MG;  Start 11/18/17 at 10:00


Levothyroxine Sodium (Synthroid) 50 mcg DAILY@06 PO  Last administered on 11/21/ 17at 04:33; Admin Dose 50 MCG;  Start 11/19/17 at 06:00


Valsartan (Diovan) 160 mg DAILY PO  Last administered on 11/21/17at 09:35; 

Admin Dose 160 MG;  Start 11/18/17 at 10:00


EZETIMIBE (Zetia) 10 mg DAILY PO  Last administered on 11/21/17at 09:36; Admin 

Dose 10 MG;  Start 11/18/17 at 10:00


Ibuprofen 800 mg 800 mg Q6H  PRN PO PAIN;  Start 11/18/17 at 10:00


Sodium Chloride (NS) 1,000 ml @  70 mls/hr [id number] IV  Last administered on 11/ 21/17at 11:47; Admin Dose 70 MLS/HR;  Start 11/18/17 at 12:30


Tramadol HCl (Ultram) 50 mg Q6H  PRN PO PAIN LEVEL 7-10 Last administered on 11/ 19/17at 13:46; Admin Dose 50 MG;  Start 11/18/17 at 12:30;  Status Future Hold


Cholecalciferol (Vitamin D) 4,000 unit DAILY PO  Last administered on 11/21/ 17at 09:35; Admin Dose 4,000 UNIT;  Start 11/19/17 at 09:00


Cholecalciferol 1000 unit 1,000 unit DAILY PO  Last administered on 11/21/17at 

09:34; Admin Dose 1,000 UNIT;  Start 11/19/17 at 09:00


Linezolid 300 ml @  200 mls/hr Q12H IVPB  Last administered on 11/21/17at 04:33

; Admin Dose 200 MLS/HR;  Start 11/19/17 at 17:00


Metronidazole (Flagyl 500 Mg (Pmx)) 100 ml @  100 mls/hr Q8 IVPB  Last 

administered on 11/21/17at 14:29; Admin Dose 100 MLS/HR;  Start 11/20/17 at 22:

00


Senna/Docusate Sodium (Senokot-S) 1 tab BID PO  Last administered on 11/21/17at 

09:35; Admin Dose 1 TAB;  Start 11/20/17 at 21:00











RADHA DE JESUS Nov 21, 2017 15:17

## 2017-11-21 NOTE — PN
DATE:  11/21/2017

 

 

At this time the patient is alert.  She has no complaints.  She has history of having a biliary leak
 that was reported on HIDA scan.  She had a percutaneous drainage of the abscess in the right gallbl
adder fossa and subsequently it was decided that she has a biliary leak and hence ERCP was performed
 and 2 stents were placed, one into the right hepatic duct, one in the left hepatic duct.  At this t
lane it was noted the percutaneous drainage is still having about 75 mL of bilious material for the p
ast 12 hours.

 

On examination, patient is alert.  Abdomen is soft.

 

LABORATORY WORKUP:  WBC count 7400, hemoglobin 8.5.

 

CLINICAL IMPRESSION:  Status post ERCP, placement of one stent into the left duct, one stent into th
e right hepatic duct.  The patient still continues to have biliary drainage.  This could very well b
e old liquid which was placed prior to the stent placement.

 

PLAN:  Continue to observe the patient.

 

 

Dictated By: LILLIAM ANTONIO/GRAHAM

DD:    11/21/2017 20:09:28

DT:    11/21/2017 20:38:54

Conf#: [id number]

DID#:  [id number]

## 2017-11-21 NOTE — PN
Date/Time of Note


Date/Time of Note


DATE: 11/21/17 


TIME: 16:11





Assessment/Plan


Lines/Catheters


IV Catheter Type (from Nrs):  Peripheral IV


Riojas in Place (from Nrs):  No





Assessment/Plan


Chief Complaint/Hosp Course


1.  Gallbladder fossa abscess/bile leak.  +HIDA.  s/p ERCP with stenting. +

cultures


-continue drain


-abx per sensitivity


-pain management


2. Anemia: no acute bleed noted


-monitor


-transfuse as needed


3. Hypocalcemia


-replete and monitor


4. Diverticulosis


-diet/lifestyle optimization


3. Hiatal hernia: asymptomatic


-monitor


4. Atherosclerosis


-diet/exercise optimization








Thank you. Patient seen and examined in collaboration with Dr. Theodore Edwards.


Problems:  





Subjective


24 Hr Interval Summary


Feels well. S/p stenting of right and left hepatic duct. Right drain with green 

output. No fevers, chills, sob, congested cough, cp, palpitations, ha, dizziness

, n/v/d/dysuria.





Exam/Review of Systems


Vital Signs


Vitals





 Vital Signs








  Date Time  Temp Pulse Resp B/P Pulse Ox O2 Delivery O2 Flow Rate FiO2


 


11/21/17 02:00 97.6 63 17 123/58 96   














 Intake and Output   


 


 11/20/17 11/20/17 11/21/17





 15:00 23:00 07:00


 


Intake Total 590 ml 1010 ml 750 ml


 


Output Total 20 ml 1910 ml 1012 ml


 


Balance 570 ml -900 ml -262 ml











Exam


Free Text/Dictation


Constitutional:  alert, oriented


Psych:  nl mood/affect


Head:  atraumatic, normocephalic


Eyes:  nl lids, nl sclera


ENMT:  mucosa pink and moist, nl nasal mucosa & septum


Neck:  non-tender, supple


Respiratory:  clear to auscultation, normal air movement


Cardiovascular:  nl pulses, regular rate and rhythm


Gastrointestinal:  min-tender at drain site, soft, surgical scars (dry without 

drainage/bruising/discoloration), drain with dark green/brown fluid


Musculoskeletal:  nl extremities to inspection, nl gait and stance


Extremities:  normal pulses


Neurological:  nl mental status, nl speech, nl strength


Skin:  nl turgor, rash or lesions


Lymph:  nl lymph nodes





Results


Result Diagram:  


11/21/17 0617                                                                  

              11/21/17 0617














SHILPI IZAGUIRRE NP Nov 21, 2017 16:15

## 2017-11-21 NOTE — CONS
LEE RUIZ NP 11/21/17 1201:


Date/Time of Note


Date/Time of Note


DATE: 11/21/17 


TIME: 11:57





Assessment/Plan


Assessment/Plan


Chief Complaint/Hosp Course


- Gall bladder fossa abscess with VRE and Bacteroides; s/p CT guided drainage 11 /17/2017


- Biliary leak, s/p ERCP and stent placement into the right hepatic duct and 

left hepatic duct 11/20/2017


- S/p  laparoscopic cholecystectomy 10/25/17.


- HTN


- Hypothyroid


- PCN allergy; tolerated meropenem





Recommendations:


- Continue Linezolid (11/19/2017-) and Metronidazole (11/20/2017-) x 14 days at 

least


- Repeat imaging prior to cessation of abx


Management d/w patient, RN CIRA, and Dr. Pham


Problems:  





Consultation Date/Type/Reason


Admit Date/Time


Nov 16, 2017 at 14:00


Initial Consult Date


11/20/17


Type of Consultation:  Infectious Disease


Referring Provider:  VIOLETTE GALDAMEZ MD





24 HR Interval Summary


Free Text/Dictation


s/p ERCP and stent placement yesterday; tolerating full liquid diet; needs new 

HL d/t infiltration per d/w nursing


Denies abd pain, n/v/d, dysuria. C/o RUE pain 2/2 infiltrated HL





Exam/Review of Systems


Vital Signs


Vitals





 Vital Signs








  Date Time  Temp Pulse Resp B/P Pulse Ox O2 Delivery O2 Flow Rate FiO2


 


11/21/17 02:00 97.6 63 17 123/58 96   














 Intake and Output   


 


 11/20/17 11/20/17 11/21/17





 15:00 23:00 07:00


 


Intake Total 590 ml 1010 ml 750 ml


 


Output Total 20 ml 1910 ml 1012 ml


 


Balance 570 ml -900 ml -262 ml











Exam


Constitutional:  alert, oriented, other (sitting in chair in NAD with RN 

attempting new HL), well developed


Psych:  nl mood/affect


Head:  atraumatic, normocephalic


Eyes:  nl sclera


Neck:  supple


Respiratory:  clear to auscultation, normal air movement


Cardiovascular:  nl pulses, regular rate and rhythm


Gastrointestinal:  non-tender, other (R sided external drain with dark purulent 

output), soft


Musculoskeletal:  nl extremities to inspection


Extremities:  normal pulses, 


   No edema


Neurological:  nl mental status


Skin:  nl turgor, 


   No rash or lesions





Results


Result Diagram:  


11/21/17 0617                                                                  

              11/21/17 0617





Results 24 hrs





Laboratory Tests








Test


  11/20/17


16:07 11/20/17


22:45 11/21/17


06:17 11/21/17


07:47


 


Bedside Glucose 102   197    224  H


 


White Blood Count   7.4  # 


 


Red Blood Count   3.22  L 


 


Hemoglobin   8.5  L 


 


Hematocrit   26.5  L 


 


Mean Corpuscular Volume   82.3   


 


Mean Corpuscular Hemoglobin   26.4  L 


 


Mean Corpuscular Hemoglobin


Concent 


  


  32.1  


  


 


 


Red Cell Distribution Width   16.1  H 


 


Platelet Count   424  H 


 


Mean Platelet Volume   9.2   


 


Neutrophils %   91.0  H 


 


Lymphocytes %   7.4  L 


 


Monocytes %   1.1   


 


Eosinophils %   0.0   


 


Basophils %   0.0   


 


Nucleated Red Blood Cells %   0.0   


 


Neutrophils #   6.7   


 


Lymphocytes #   0.6  L 


 


Monocytes #   0.1  L 


 


Eosinophils #   0.0   


 


Basophils #   0.0   


 


Nucleated Red Blood Cells #   0.0   


 


Sodium Level   139   


 


Potassium Level   3.6   


 


Chloride Level   108   


 


Carbon Dioxide Level   24   


 


Anion Gap   11   


 


Blood Urea Nitrogen   6  L 


 


Creatinine   0.46   


 


Glucose Level   215  # 


 


Calcium Level   7.7  L 














Test


  11/21/17


11:42 


  


  


 


 


Bedside Glucose 194     











Medications


Medications





 Current Medications


Hydralazine HCl (Apresoline) 10 mg Q6H  PRN IV SBP>170;  Start 11/17/17 at 11:30


Morphine Sulfate (morphine) 1 mg Q4H  PRN IV PAIN Last administered on 11/17/ 17at 21:00; Admin Dose 1 MG;  Start 11/17/17 at 12:30


Ondansetron HCl (Zofran Inj) 4 mg Q4H  PRN IV NAUSEA AND/OR VOMITING Last 

administered on 11/20/17at 10:24; Admin Dose 4 MG;  Start 11/17/17 at 13:00


Acetaminophen (Tylenol Tab) 650 mg Q4H  PRN PO PAIN AND OR ELEVATED TEMP Last 

administered on 11/21/17at 09:42; Admin Dose 650 MG;  Start 11/17/17 at 13:00


Pantoprazole (Protonix Tab) 40 mg DAILY@06 PO  Last administered on 11/21/17at 

04:33; Admin Dose 40 MG;  Start 11/18/17 at 06:00


Al Hydrox/Mg Hydrox/Simethicone (Mag-Al Plus) 30 ml Q6H  PRN PO 

GASTROINTESTINAL UPSET;  Start 11/17/17 at 20:00


Folic Acid (Folic Acid) 1 mg DAILY PO  Last administered on 11/21/17at 09:35; 

Admin Dose 1 MG;  Start 11/18/17 at 10:00


Levothyroxine Sodium (Synthroid) 50 mcg DAILY@06 PO  Last administered on 11/21/ 17at 04:33; Admin Dose 50 MCG;  Start 11/19/17 at 06:00


Valsartan (Diovan) 160 mg DAILY PO  Last administered on 11/21/17at 09:35; 

Admin Dose 160 MG;  Start 11/18/17 at 10:00


EZETIMIBE (Zetia) 10 mg DAILY PO  Last administered on 11/21/17at 09:36; Admin 

Dose 10 MG;  Start 11/18/17 at 10:00


Ibuprofen 800 mg 800 mg Q6H  PRN PO PAIN;  Start 11/18/17 at 10:00


Sodium Chloride (NS) 1,000 ml @  70 mls/hr [id number] IV  Last administered on 11/ 21/17at 11:47; Admin Dose 70 MLS/HR;  Start 11/18/17 at 12:30


Tramadol HCl (Ultram) 50 mg Q6H  PRN PO PAIN LEVEL 7-10 Last administered on 11/ 19/17at 13:46; Admin Dose 50 MG;  Start 11/18/17 at 12:30;  Status Future Hold


Cholecalciferol (Vitamin D) 4,000 unit DAILY PO  Last administered on 11/21/ 17at 09:35; Admin Dose 4,000 UNIT;  Start 11/19/17 at 09:00


Cholecalciferol 1000 unit 1,000 unit DAILY PO  Last administered on 11/21/17at 

09:34; Admin Dose 1,000 UNIT;  Start 11/19/17 at 09:00


Linezolid 300 ml @  200 mls/hr Q12H IVPB  Last administered on 11/21/17at 04:33

; Admin Dose 200 MLS/HR;  Start 11/19/17 at 17:00


Dextrose/Sodium Chloride 1,000 ml @  70 mls/hr [id number] IV  Last administered on 

11/20/17at 10:52; Admin Dose 70 MLS/HR;  Start 11/20/17 at 11:00


Metronidazole (Flagyl 500 Mg (Pmx)) 100 ml @  100 mls/hr Q8 IVPB  Last 

administered on 11/21/17at 04:33; Admin Dose 100 MLS/HR;  Start 11/20/17 at 22:

00


Senna/Docusate Sodium (Senokot-S) 1 tab BID PO  Last administered on 11/21/17at 

09:35; Admin Dose 1 TAB;  Start 11/20/17 at 21:00





FRANCES PHAM MD 11/22/17 0619:


Assessment/Plan


Assessment/Plan


Chief Complaint/Hosp Course


emr reviewed. case d/w np. we will be in shortly for further eval. today. ty


Problems:  





Exam/Review of Systems


Results


Result Diagram:  


11/21/17 0617 11/21/17 0617














LEE RUIZ NP Nov 21, 2017 12:01


FRANCES PHAM MD Nov 22, 2017 06:19

## 2017-11-22 VITALS — RESPIRATION RATE: 18 BRPM | DIASTOLIC BLOOD PRESSURE: 62 MMHG | SYSTOLIC BLOOD PRESSURE: 142 MMHG

## 2017-11-22 VITALS — SYSTOLIC BLOOD PRESSURE: 139 MMHG | DIASTOLIC BLOOD PRESSURE: 61 MMHG | RESPIRATION RATE: 20 BRPM

## 2017-11-22 VITALS — RESPIRATION RATE: 18 BRPM | DIASTOLIC BLOOD PRESSURE: 70 MMHG | SYSTOLIC BLOOD PRESSURE: 154 MMHG

## 2017-11-22 VITALS — RESPIRATION RATE: 18 BRPM | DIASTOLIC BLOOD PRESSURE: 56 MMHG | SYSTOLIC BLOOD PRESSURE: 118 MMHG

## 2017-11-22 LAB
ANION GAP SERPL CALC-SCNC: 9 MMOL/L (ref 8–16)
BASOPHILS # BLD AUTO: 0.1 10^3/UL (ref 0–0.1)
BASOPHILS NFR BLD: 0.6 % (ref 0–2)
BUN SERPL-MCNC: 7 MG/DL (ref 7–20)
CALCIUM SERPL-MCNC: 7.4 MG/DL (ref 8.4–10.2)
CHLORIDE SERPL-SCNC: 112 MMOL/L (ref 97–110)
CO2 SERPL-SCNC: 24 MMOL/L (ref 21–31)
CREAT SERPL-MCNC: 0.54 MG/DL (ref 0.44–1)
EOSINOPHIL # BLD: 0.1 10^3/UL (ref 0–0.5)
EOSINOPHIL NFR BLD: 1.4 % (ref 0–7)
ERYTHROCYTE [DISTWIDTH] IN BLOOD BY AUTOMATED COUNT: 16.5 % (ref 11.5–14.5)
GLUCOSE SERPL-MCNC: 98 MG/DL (ref 70–220)
HCT VFR BLD CALC: 25 % (ref 37–47)
HGB BLD-MCNC: 8 G/DL (ref 12–16)
LYMPHOCYTES # BLD AUTO: 2.1 10^3/UL (ref 0.8–2.9)
LYMPHOCYTES NFR BLD AUTO: 25.2 % (ref 15–51)
MCH RBC QN AUTO: 26.3 PG (ref 29–33)
MCHC RBC AUTO-ENTMCNC: 32 G/DL (ref 32–37)
MCV RBC AUTO: 82.2 FL (ref 82–101)
MONOCYTES # BLD: 0.7 10^3/UL (ref 0.3–0.9)
MONOCYTES NFR BLD: 8.2 % (ref 0–11)
NEUTROPHILS # BLD: 5.4 10^3/UL (ref 1.6–7.5)
NEUTROPHILS NFR BLD AUTO: 64.1 % (ref 39–77)
NRBC # BLD MANUAL: 0 10^3/UL (ref 0–0)
NRBC BLD AUTO-RTO: 0 /100WBC (ref 0–0)
PLATELET # BLD: 440 10^3/UL (ref 140–415)
PMV BLD AUTO: 9.3 FL (ref 7.4–10.4)
POTASSIUM SERPL-SCNC: 3.2 MMOL/L (ref 3.5–5.1)
RBC # BLD AUTO: 3.04 10^6/UL (ref 4.2–5.4)
SODIUM SERPL-SCNC: 142 MMOL/L (ref 135–144)
WBC # BLD AUTO: 8.3 10^3/UL (ref 4.8–10.8)

## 2017-11-22 RX ADMIN — VITAMIN D, TAB 1000IU (100/BT) SCH UNIT: 25 TAB at 09:15

## 2017-11-22 RX ADMIN — Medication SCH MLS/HR: at 22:06

## 2017-11-22 RX ADMIN — DOCUSATE SODIUM AND SENNOSIDES SCH TAB: 8.6; 5 TABLET, FILM COATED ORAL at 09:16

## 2017-11-22 RX ADMIN — DEXAMETHASONE SODIUM PHOSPHATE PRN MG: 10 INJECTION, SOLUTION INTRAMUSCULAR; INTRAVENOUS at 17:11

## 2017-11-22 RX ADMIN — LINEZOLID SCH MLS/HR: 600 INJECTION, SOLUTION INTRAVENOUS at 05:29

## 2017-11-22 RX ADMIN — FOLIC ACID SCH MG: 1 TABLET ORAL at 09:16

## 2017-11-22 RX ADMIN — ACARBOSE SCH MG: 50 TABLET ORAL at 17:23

## 2017-11-22 RX ADMIN — VALSARTAN SCH MG: 160 TABLET, FILM COATED ORAL at 09:15

## 2017-11-22 RX ADMIN — LINEZOLID SCH MLS/HR: 600 INJECTION, SOLUTION INTRAVENOUS at 17:11

## 2017-11-22 RX ADMIN — PANTOPRAZOLE SODIUM SCH MG: 40 TABLET, DELAYED RELEASE ORAL at 05:31

## 2017-11-22 RX ADMIN — ACARBOSE SCH MG: 50 TABLET ORAL at 09:16

## 2017-11-22 RX ADMIN — FOLIC ACID SCH MLS/HR: 5 INJECTION, SOLUTION INTRAMUSCULAR; INTRAVENOUS; SUBCUTANEOUS at 17:12

## 2017-11-22 RX ADMIN — DEXAMETHASONE SODIUM PHOSPHATE PRN MG: 10 INJECTION, SOLUTION INTRAMUSCULAR; INTRAVENOUS at 07:51

## 2017-11-22 RX ADMIN — Medication SCH MLS/HR: at 14:01

## 2017-11-22 RX ADMIN — EZETIMIBE SCH MG: 10 TABLET ORAL at 09:15

## 2017-11-22 RX ADMIN — DOCUSATE SODIUM AND SENNOSIDES SCH TAB: 8.6; 5 TABLET, FILM COATED ORAL at 21:00

## 2017-11-22 RX ADMIN — LEVOTHYROXINE SODIUM SCH MCG: 50 TABLET ORAL at 05:31

## 2017-11-22 RX ADMIN — FOLIC ACID SCH MLS/HR: 5 INJECTION, SOLUTION INTRAMUSCULAR; INTRAVENOUS; SUBCUTANEOUS at 01:36

## 2017-11-22 RX ADMIN — Medication SCH MLS/HR: at 05:31

## 2017-11-22 NOTE — CONS
Date/Time of Note


Date/Time of Note


DATE: 11/22/17 


TIME: 06:20





Assessment/Plan


Assessment/Plan


Chief Complaint/Hosp Course


emr reviewed. case d/w np. we will be in shortly for further eval. today. ty


Problems:  





Consultation Date/Type/Reason


Admit Date/Time


Nov 16, 2017 at 14:00


Initial Consult Date


11/20/17


Type of Consultation:  Infectious Disease


Referring Provider:  VIOLETTE GALDAMEZ MD





Exam/Review of Systems


Vital Signs


Vitals





 Vital Signs








  Date Time  Temp Pulse Resp B/P Pulse Ox O2 Delivery O2 Flow Rate FiO2


 


11/22/17 02:16 98.3 67 18 118/56 98   














 Intake and Output   


 


 11/21/17 11/21/17 11/22/17





 14:59 22:59 06:59


 


Intake Total  2350 ml 650 ml


 


Balance  2350 ml 650 ml











Results


Result Diagram:  


11/22/17 0500                                                                  

              11/21/17 0617





Results 24 hrs





Laboratory Tests








Test


  11/21/17


07:47 11/21/17


11:42 11/21/17


17:29 11/21/17


20:48


 


Bedside Glucose 224  H 194   114   146  














Test


  11/22/17


05:00 


  


  


 


 


White Blood Count 8.3     


 


Red Blood Count 3.04  L   


 


Hemoglobin 8.0  L   


 


Hematocrit 25.0  L   


 


Mean Corpuscular Volume 82.2     


 


Mean Corpuscular Hemoglobin 26.3  L   


 


Mean Corpuscular Hemoglobin


Concent 32.0  


  


  


  


 


 


Red Cell Distribution Width 16.5  H   


 


Platelet Count 440  H   


 


Mean Platelet Volume 9.3     


 


Neutrophils % 64.1     


 


Lymphocytes % 25.2     


 


Monocytes % 8.2     


 


Eosinophils % 1.4     


 


Basophils % 0.6     


 


Nucleated Red Blood Cells % 0.0     


 


Neutrophils # 5.4     


 


Lymphocytes # 2.1     


 


Monocytes # 0.7     


 


Eosinophils # 0.1     


 


Basophils # 0.1     


 


Nucleated Red Blood Cells # 0.0     











Medications


Medications





 Current Medications


Hydralazine HCl (Apresoline) 10 mg Q6H  PRN IV SBP>170;  Start 11/17/17 at 11:30


Morphine Sulfate (morphine) 1 mg Q4H  PRN IV PAIN Last administered on 11/17/ 17at 21:00; Admin Dose 1 MG;  Start 11/17/17 at 12:30


Ondansetron HCl (Zofran Inj) 4 mg Q4H  PRN IV NAUSEA AND/OR VOMITING Last 

administered on 11/20/17at 10:24; Admin Dose 4 MG;  Start 11/17/17 at 13:00


Acetaminophen (Tylenol Tab) 650 mg Q4H  PRN PO PAIN AND OR ELEVATED TEMP Last 

administered on 11/21/17at 09:42; Admin Dose 650 MG;  Start 11/17/17 at 13:00


Pantoprazole (Protonix Tab) 40 mg DAILY@06 PO  Last administered on 11/22/17at 

05:31; Admin Dose 40 MG;  Start 11/18/17 at 06:00


Al Hydrox/Mg Hydrox/Simethicone (Mag-Al Plus) 30 ml Q6H  PRN PO 

GASTROINTESTINAL UPSET;  Start 11/17/17 at 20:00


Folic Acid (Folic Acid) 1 mg DAILY PO  Last administered on 11/21/17at 09:35; 

Admin Dose 1 MG;  Start 11/18/17 at 10:00


Levothyroxine Sodium (Synthroid) 50 mcg DAILY@06 PO  Last administered on 11/22/ 17at 05:31; Admin Dose 50 MCG;  Start 11/19/17 at 06:00


Valsartan (Diovan) 160 mg DAILY PO  Last administered on 11/21/17at 09:35; 

Admin Dose 160 MG;  Start 11/18/17 at 10:00


EZETIMIBE (Zetia) 10 mg DAILY PO  Last administered on 11/21/17at 09:36; Admin 

Dose 10 MG;  Start 11/18/17 at 10:00


Ibuprofen 800 mg 800 mg Q6H  PRN PO PAIN;  Start 11/18/17 at 10:00


Sodium Chloride (NS) 1,000 ml @  70 mls/hr [id number] IV  Last administered on 11/ 22/17at 01:36; Admin Dose 70 MLS/HR;  Start 11/18/17 at 12:30


Tramadol HCl (Ultram) 50 mg Q6H  PRN PO PAIN LEVEL 7-10 Last administered on 11/ 19/17at 13:46; Admin Dose 50 MG;  Start 11/18/17 at 12:30;  Status Future Hold


Cholecalciferol (Vitamin D) 4,000 unit DAILY PO  Last administered on 11/21/ 17at 09:35; Admin Dose 4,000 UNIT;  Start 11/19/17 at 09:00


Cholecalciferol 1000 unit 1,000 unit DAILY PO  Last administered on 11/21/17at 

09:34; Admin Dose 1,000 UNIT;  Start 11/19/17 at 09:00


Linezolid 300 ml @  200 mls/hr Q12H IVPB  Last administered on 11/22/17at 05:29

; Admin Dose 200 MLS/HR;  Start 11/19/17 at 17:00


Metronidazole (Flagyl 500 Mg (Pmx)) 100 ml @  100 mls/hr Q8 IVPB  Last 

administered on 11/22/17at 05:31; Admin Dose 100 MLS/HR;  Start 11/20/17 at 22:

00


Senna/Docusate Sodium (Senokot-S) 1 tab BID PO  Last administered on 11/21/17at 

09:35; Admin Dose 1 TAB;  Start 11/20/17 at 21:00











FRANCES FARLEY MD Nov 22, 2017 06:21

## 2017-11-22 NOTE — PN
Date/Time of Note


Date/Time of Note


DATE: 11/22/17 


TIME: 17:06





Assessment/Plan


VTE Prophylaxis


VTE Prophylaxis Intervention:  SCD's





Lines/Catheters


IV Catheter Type (from [facility]):  Peripheral IV


Urinary Cath still in place:  No





Assessment/Plan


Chief Complaint/Hosp Course





Assessment/Plan


-Gallbladder fossa abscess/bile leak. S/p drainage by radiology.  Positive HIDA 

scan. S/p ERCP and stent placement into the right hepatic duct and left hepatic 

duct on 11/20 by Dr Knapp.  Cyst fluid culture positive for VRE, continue 

Zyvox.  Dr. Pham is following in infection disease consultation


-S/p  laparoscopic cholecystectomy 11/26/17.


-Status post ERCP with stent placement status post subsequent stent removal


-Hypertension


-Hypothyroidism


-RA





Further recommendations based on clinical course.  Plan of care discussed with 

Dr. Brooks


Problems:  





Exam/Review of Systems


Vital Signs


Vitals





 Vital Signs








  Date Time  Temp Pulse Resp B/P Pulse Ox O2 Delivery O2 Flow Rate FiO2


 


11/22/17 15:33 98.7 89 20 139/61 96   














 Intake and Output   


 


 11/21/17 11/21/17 11/22/17





 15:00 23:00 07:00


 


Intake Total  2350 ml 1330 ml


 


Balance  2350 ml 1330 ml











Exam


Constitutional:  alert, oriented


Neck:  supple


Respiratory:  normal air movement


Cardiovascular:  nl pulses


Gastrointestinal:  other (Right upper quadrant drain), soft


Musculoskeletal:  nl extremities to inspection


Extremities:  normal pulses


Neurological:  nl mental status





Results


Result Diagram:  


11/22/17 0500                                                                  

              11/22/17 0501





Results 24 hrs





Laboratory Tests








Test


  11/21/17


17:29 11/21/17


20:48 11/22/17


05:00 11/22/17


05:01


 


Bedside Glucose 114   146    


 


White Blood Count   8.3   


 


Red Blood Count   3.04  L 


 


Hemoglobin   8.0  L 


 


Hematocrit   25.0  L 


 


Mean Corpuscular Volume   82.2   


 


Mean Corpuscular Hemoglobin   26.3  L 


 


Mean Corpuscular Hemoglobin


Concent 


  


  32.0  


  


 


 


Red Cell Distribution Width   16.5  H 


 


Platelet Count   440  H 


 


Mean Platelet Volume   9.3   


 


Neutrophils %   64.1   


 


Lymphocytes %   25.2   


 


Monocytes %   8.2   


 


Eosinophils %   1.4   


 


Basophils %   0.6   


 


Nucleated Red Blood Cells %   0.0   


 


Neutrophils #   5.4   


 


Lymphocytes #   2.1   


 


Monocytes #   0.7   


 


Eosinophils #   0.1   


 


Basophils #   0.1   


 


Nucleated Red Blood Cells #   0.0   


 


Sodium Level    142  


 


Potassium Level    3.2  L


 


Chloride Level    112  H


 


Carbon Dioxide Level    24  


 


Anion Gap    9  


 


Blood Urea Nitrogen    7  


 


Creatinine    0.54  


 


Glucose Level    98  #


 


Calcium Level    7.4  L














Test


  11/22/17


08:00 11/22/17


11:39 


  


 


 


Bedside Glucose 132   164    











Medications


Medications





 Current Medications


Hydralazine HCl (Apresoline) 10 mg Q6H  PRN IV SBP>170;  Start 11/17/17 at 11:30


Morphine Sulfate (morphine) 1 mg Q4H  PRN IV PAIN Last administered on 11/17/ 17at 21:00; Admin Dose 1 MG;  Start 11/17/17 at 12:30


Ondansetron HCl (Zofran Inj) 4 mg Q4H  PRN IV NAUSEA AND/OR VOMITING Last 

administered on 11/22/17at 07:51; Admin Dose 4 MG;  Start 11/17/17 at 13:00


Acetaminophen (Tylenol Tab) 650 mg Q4H  PRN PO PAIN AND OR ELEVATED TEMP Last 

administered on 11/21/17at 09:42; Admin Dose 650 MG;  Start 11/17/17 at 13:00


Pantoprazole (Protonix Tab) 40 mg DAILY@06 PO  Last administered on 11/22/17at 

05:31; Admin Dose 40 MG;  Start 11/18/17 at 06:00


Al Hydrox/Mg Hydrox/Simethicone (Mag-Al Plus) 30 ml Q6H  PRN PO 

GASTROINTESTINAL UPSET;  Start 11/17/17 at 20:00


Folic Acid (Folic Acid) 1 mg DAILY PO  Last administered on 11/22/17at 09:16; 

Admin Dose 1 MG;  Start 11/18/17 at 10:00


Levothyroxine Sodium (Synthroid) 50 mcg DAILY@06 PO  Last administered on 11/22/ 17at 05:31; Admin Dose 50 MCG;  Start 11/19/17 at 06:00


Valsartan (Diovan) 160 mg DAILY PO  Last administered on 11/22/17at 09:15; 

Admin Dose 160 MG;  Start 11/18/17 at 10:00


EZETIMIBE (Zetia) 10 mg DAILY PO  Last administered on 11/22/17at 09:15; Admin 

Dose 10 MG;  Start 11/18/17 at 10:00


Ibuprofen 800 mg 800 mg Q6H  PRN PO PAIN;  Start 11/18/17 at 10:00


Sodium Chloride (NS) 1,000 ml @  70 mls/hr [id number] IV  Last administered on 11/ 22/17at 01:36; Admin Dose 70 MLS/HR;  Start 11/18/17 at 12:30


Tramadol HCl (Ultram) 50 mg Q6H  PRN PO PAIN LEVEL 7-10 Last administered on 11/ 19/17at 13:46; Admin Dose 50 MG;  Start 11/18/17 at 12:30;  Status Future Hold


Cholecalciferol (Vitamin D) 4,000 unit DAILY PO  Last administered on 11/22/ 17at 09:15; Admin Dose 4,000 UNIT;  Start 11/19/17 at 09:00


Cholecalciferol 1000 unit 1,000 unit DAILY PO  Last administered on 11/22/17at 

09:15; Admin Dose 1,000 UNIT;  Start 11/19/17 at 09:00


Linezolid 300 ml @  200 mls/hr Q12H IVPB  Last administered on 11/22/17at 05:29

; Admin Dose 200 MLS/HR;  Start 11/19/17 at 17:00


Metronidazole (Flagyl 500 Mg (Pmx)) 100 ml @  100 mls/hr Q8 IVPB  Last 

administered on 11/22/17at 14:01; Admin Dose 100 MLS/HR;  Start 11/20/17 at 22:

00


Senna/Docusate Sodium (Senokot-S) 1 tab BID PO  Last administered on 11/22/17at 

09:16; Admin Dose 1 TAB;  Start 11/20/17 at 21:00











RADHA DE JESUS Nov 22, 2017 17:08

## 2017-11-22 NOTE — CONS
DATE OF ADMISSION: 11/16/2017

DATE OF CONSULTATION:  11/22/2017

 

 

 

PROGRESS NOTE:  The patient at this time has no complaints.  She underwent a laparoscopic cholecyste
ctomy in another hospital several weeks ago.  The patient presented to the [facility] with right gallbladder fossa fluid collection and this was drained percutaneously.  Subsequently
, HIDA scan showed evidence of a persistent biliary leak.  The source was not clear.  ERCP showed ev
idence of a possibility that the branch of the right hepatic duct may be showing a biliary leak and 
also the cystic duct may be showing a leak, but these are very difficult to confirm on the images.  
Two stents were placed, one into the right hepatic duct and one into the left hepatic duct, 10-Frenc
h each.

 

She had drainage of 150 mL up to yesterday at 7 p.m.  Since 7:00 p.m. yesterday up to 5 p.m. today s
he only drained about 30 mL, so indicating that the stents may be causing the occlusion of the cysti
c duct and the branch of the right hepatic duct which is desirable.

 

PLAN:  Continue to observe for more drainage to stop.

 

 

Dictated By: LILLIAM CRENSHAW MD

 

NC/NTS

DD:    11/22/2017 17:58:03

DT:    11/22/2017 19:30:11

Conf#: [id number]

DID#:  [id number]

CC: LIANET GROVER MD; VIOLETTE GALDAMEZ MD;*End*

## 2017-11-23 VITALS — RESPIRATION RATE: 18 BRPM | DIASTOLIC BLOOD PRESSURE: 65 MMHG | SYSTOLIC BLOOD PRESSURE: 148 MMHG

## 2017-11-23 VITALS — SYSTOLIC BLOOD PRESSURE: 138 MMHG | RESPIRATION RATE: 18 BRPM | DIASTOLIC BLOOD PRESSURE: 78 MMHG

## 2017-11-23 VITALS — RESPIRATION RATE: 18 BRPM | DIASTOLIC BLOOD PRESSURE: 78 MMHG | SYSTOLIC BLOOD PRESSURE: 128 MMHG

## 2017-11-23 VITALS — SYSTOLIC BLOOD PRESSURE: 151 MMHG | RESPIRATION RATE: 19 BRPM | DIASTOLIC BLOOD PRESSURE: 65 MMHG

## 2017-11-23 LAB
ANION GAP SERPL CALC-SCNC: 8 MMOL/L (ref 8–16)
BASOPHILS # BLD AUTO: 0.1 10^3/UL (ref 0–0.1)
BASOPHILS NFR BLD: 0.9 % (ref 0–2)
BUN SERPL-MCNC: 3 MG/DL (ref 7–20)
CALCIUM SERPL-MCNC: 7.8 MG/DL (ref 8.4–10.2)
CHLORIDE SERPL-SCNC: 109 MMOL/L (ref 97–110)
CO2 SERPL-SCNC: 27 MMOL/L (ref 21–31)
CREAT SERPL-MCNC: 0.5 MG/DL (ref 0.44–1)
EOSINOPHIL # BLD: 0.3 10^3/UL (ref 0–0.5)
EOSINOPHIL NFR BLD: 3.3 % (ref 0–7)
ERYTHROCYTE [DISTWIDTH] IN BLOOD BY AUTOMATED COUNT: 16.9 % (ref 11.5–14.5)
GLUCOSE SERPL-MCNC: 148 MG/DL (ref 70–220)
HCT VFR BLD CALC: 27.3 % (ref 37–47)
HGB BLD-MCNC: 8.8 G/DL (ref 12–16)
LYMPHOCYTES # BLD AUTO: 1.6 10^3/UL (ref 0.8–2.9)
LYMPHOCYTES NFR BLD AUTO: 19.6 % (ref 15–51)
MCH RBC QN AUTO: 26.7 PG (ref 29–33)
MCHC RBC AUTO-ENTMCNC: 32.2 G/DL (ref 32–37)
MCV RBC AUTO: 83 FL (ref 82–101)
MONOCYTES # BLD: 0.7 10^3/UL (ref 0.3–0.9)
MONOCYTES NFR BLD: 8.6 % (ref 0–11)
NEUTROPHILS # BLD: 5.5 10^3/UL (ref 1.6–7.5)
NEUTROPHILS NFR BLD AUTO: 67.2 % (ref 39–77)
NRBC # BLD MANUAL: 0 10^3/UL (ref 0–0)
NRBC BLD AUTO-RTO: 0 /100WBC (ref 0–0)
PLATELET # BLD: 444 10^3/UL (ref 140–415)
PMV BLD AUTO: 9.1 FL (ref 7.4–10.4)
POTASSIUM SERPL-SCNC: 3.2 MMOL/L (ref 3.5–5.1)
RBC # BLD AUTO: 3.29 10^6/UL (ref 4.2–5.4)
SODIUM SERPL-SCNC: 141 MMOL/L (ref 135–144)
WBC # BLD AUTO: 8.2 10^3/UL (ref 4.8–10.8)

## 2017-11-23 RX ADMIN — ACARBOSE SCH MG: 50 TABLET ORAL at 08:53

## 2017-11-23 RX ADMIN — DEXAMETHASONE SODIUM PHOSPHATE PRN MG: 10 INJECTION, SOLUTION INTRAMUSCULAR; INTRAVENOUS at 05:19

## 2017-11-23 RX ADMIN — Medication SCH MLS/HR: at 06:48

## 2017-11-23 RX ADMIN — DEXAMETHASONE SODIUM PHOSPHATE PRN MG: 10 INJECTION, SOLUTION INTRAMUSCULAR; INTRAVENOUS at 14:09

## 2017-11-23 RX ADMIN — ACARBOSE SCH MG: 50 TABLET ORAL at 17:35

## 2017-11-23 RX ADMIN — FOLIC ACID SCH MLS/HR: 5 INJECTION, SOLUTION INTRAMUSCULAR; INTRAVENOUS; SUBCUTANEOUS at 06:54

## 2017-11-23 RX ADMIN — Medication SCH MLS/HR: at 21:40

## 2017-11-23 RX ADMIN — FOLIC ACID SCH MG: 1 TABLET ORAL at 08:54

## 2017-11-23 RX ADMIN — VALSARTAN SCH MG: 160 TABLET, FILM COATED ORAL at 08:53

## 2017-11-23 RX ADMIN — Medication SCH MLS/HR: at 14:08

## 2017-11-23 RX ADMIN — LEVOTHYROXINE SODIUM SCH MCG: 50 TABLET ORAL at 05:30

## 2017-11-23 RX ADMIN — LINEZOLID SCH MLS/HR: 600 INJECTION, SOLUTION INTRAVENOUS at 16:04

## 2017-11-23 RX ADMIN — LINEZOLID SCH MLS/HR: 600 INJECTION, SOLUTION INTRAVENOUS at 05:17

## 2017-11-23 RX ADMIN — PANTOPRAZOLE SODIUM SCH MG: 40 TABLET, DELAYED RELEASE ORAL at 05:29

## 2017-11-23 RX ADMIN — DOCUSATE SODIUM AND SENNOSIDES SCH TAB: 8.6; 5 TABLET, FILM COATED ORAL at 20:40

## 2017-11-23 RX ADMIN — MORPHINE SULFATE PRN MG: 2 INJECTION, SOLUTION INTRAMUSCULAR; INTRAVENOUS at 22:42

## 2017-11-23 RX ADMIN — DOCUSATE SODIUM AND SENNOSIDES SCH TAB: 8.6; 5 TABLET, FILM COATED ORAL at 08:51

## 2017-11-23 RX ADMIN — VITAMIN D, TAB 1000IU (100/BT) SCH UNIT: 25 TAB at 08:53

## 2017-11-23 RX ADMIN — EZETIMIBE SCH MG: 10 TABLET ORAL at 08:53

## 2017-11-23 NOTE — CONS
Date/Time of Note


Date/Time of Note


DATE: 11/23/17 


TIME: 13:25





Assessment/Plan


Assessment/Plan


Chief Complaint/Hosp Course


- Gall bladder fossa abscess with VRE and Bacteroides; s/p CT guided drainage 11 /17/2017


- Biliary leak, s/p ERCP and stent placement into the right hepatic duct and 

left hepatic duct 11/20/2017


- S/p  laparoscopic cholecystectomy 10/25/17.


- HTN


- Hypothyroid


- PCN allergy; tolerated meropenem





Recommendations:


- Continue Linezolid (11/19/2017-) and Metronidazole (11/20/2017-) x 14 days at 

least


- Repeat imaging prior to cessation of abx


Problems:  





Consultation Date/Type/Reason


Admit Date/Time


Nov 16, 2017 at 14:00


Initial Consult Date


11/20/17


Type of Consultation:  Infectious Disease


Referring Provider:  VIOLETTE GALDAMEZ MD





24 HR Interval Summary


Free Text/Dictation


tolerating abx. notes fluid color change today





Exam/Review of Systems


Vital Signs


Vitals





 Vital Signs








  Date Time  Temp Pulse Resp B/P Pulse Ox O2 Delivery O2 Flow Rate FiO2


 


11/23/17 08:06 97.9 71 18 138/78 96   














 Intake and Output   


 


 11/22/17 11/22/17 11/23/17





 15:00 23:00 07:00


 


Intake Total  2160 ml 1740 ml


 


Output Total  20 ml 


 


Balance  2140 ml 1740 ml











Exam


Constitutional:  alert, oriented, well developed


Psych:  nl mood/affect, no complaints


Head:  atraumatic, normocephalic


Eyes:  EOMI, PERRL, nl conjunctiva, nl lids, nl sclera


Respiratory:  clear to auscultation, normal air movement


Cardiovascular:  nl pulses, regular rate and rhythm





Results


Result Diagram:  


11/23/17 0622 11/23/17 0622





Results 24 hrs





Laboratory Tests








Test


  11/22/17


17:19 11/22/17


20:39 11/23/17


06:22 11/23/17


07:36


 


Bedside Glucose 98   101    136  


 


White Blood Count   8.2   


 


Red Blood Count   3.29  L 


 


Hemoglobin   8.8  L 


 


Hematocrit   27.3  L 


 


Mean Corpuscular Volume   83.0   


 


Mean Corpuscular Hemoglobin   26.7  L 


 


Mean Corpuscular Hemoglobin


Concent 


  


  32.2  


  


 


 


Red Cell Distribution Width   16.9  H 


 


Platelet Count   444  H 


 


Mean Platelet Volume   9.1   


 


Neutrophils %   67.2   


 


Lymphocytes %   19.6   


 


Monocytes %   8.6   


 


Eosinophils %   3.3   


 


Basophils %   0.9   


 


Nucleated Red Blood Cells %   0.0   


 


Neutrophils #   5.5   


 


Lymphocytes #   1.6   


 


Monocytes #   0.7   


 


Eosinophils #   0.3   


 


Basophils #   0.1   


 


Nucleated Red Blood Cells #   0.0   


 


Sodium Level   141   


 


Potassium Level   3.2  L 


 


Chloride Level   109   


 


Carbon Dioxide Level   27   


 


Anion Gap   8   


 


Blood Urea Nitrogen   3  L 


 


Creatinine   0.50   


 


Glucose Level   148  # 


 


Calcium Level   7.8  L 














Test


  11/23/17


11:07 


  


  


 


 


Bedside Glucose 113     











Medications


Medications





 Current Medications


Hydralazine HCl (Apresoline) 10 mg Q6H  PRN IV SBP>170;  Start 11/17/17 at 11:30


Morphine Sulfate (morphine) 1 mg Q4H  PRN IV PAIN Last administered on 11/17/ 17at 21:00; Admin Dose 1 MG;  Start 11/17/17 at 12:30


Ondansetron HCl (Zofran Inj) 4 mg Q4H  PRN IV NAUSEA AND/OR VOMITING Last 

administered on 11/23/17at 05:19; Admin Dose 4 MG;  Start 11/17/17 at 13:00


Acetaminophen (Tylenol Tab) 650 mg Q4H  PRN PO PAIN AND OR ELEVATED TEMP Last 

administered on 11/21/17at 09:42; Admin Dose 650 MG;  Start 11/17/17 at 13:00


Pantoprazole (Protonix Tab) 40 mg DAILY@06 PO  Last administered on 11/23/17at 

05:29; Admin Dose 40 MG;  Start 11/18/17 at 06:00


Al Hydrox/Mg Hydrox/Simethicone (Mag-Al Plus) 30 ml Q6H  PRN PO 

GASTROINTESTINAL UPSET;  Start 11/17/17 at 20:00


Folic Acid (Folic Acid) 1 mg DAILY PO  Last administered on 11/23/17at 08:54; 

Admin Dose 1 MG;  Start 11/18/17 at 10:00


Levothyroxine Sodium (Synthroid) 50 mcg DAILY@06 PO  Last administered on 11/23/ 17at 05:30; Admin Dose 50 MCG;  Start 11/19/17 at 06:00


Valsartan (Diovan) 160 mg DAILY PO  Last administered on 11/23/17at 08:53; 

Admin Dose 160 MG;  Start 11/18/17 at 10:00


EZETIMIBE (Zetia) 10 mg DAILY PO  Last administered on 11/23/17at 08:53; Admin 

Dose 10 MG;  Start 11/18/17 at 10:00


Ibuprofen 800 mg 800 mg Q6H  PRN PO PAIN;  Start 11/18/17 at 10:00


Sodium Chloride (NS) 1,000 ml @  70 mls/hr [id number] IV  Last administered on 11/ 22/17at 17:12; Admin Dose 70 MLS/HR;  Start 11/18/17 at 12:30


Tramadol HCl (Ultram) 50 mg Q6H  PRN PO PAIN LEVEL 7-10 Last administered on 11/ 19/17at 13:46; Admin Dose 50 MG;  Start 11/18/17 at 12:30;  Status Future Hold


Cholecalciferol (Vitamin D) 4,000 unit DAILY PO  Last administered on 11/23/ 17at 08:53; Admin Dose 4,000 UNIT;  Start 11/19/17 at 09:00


Cholecalciferol 1000 unit 1,000 unit DAILY PO  Last administered on 11/23/17at 

08:53; Admin Dose 1,000 UNIT;  Start 11/19/17 at 09:00


Linezolid 300 ml @  200 mls/hr Q12H IVPB  Last administered on 11/23/17at 05:17

; Admin Dose 200 MLS/HR;  Start 11/19/17 at 17:00


Metronidazole (Flagyl 500 Mg (Pmx)) 100 ml @  100 mls/hr Q8 IVPB  Last 

administered on 11/23/17at 06:48; Admin Dose 100 MLS/HR;  Start 11/20/17 at 22:

00


Senna/Docusate Sodium (Senokot-S) 1 tab BID PO  Last administered on 11/22/17at 

09:16; Admin Dose 1 TAB;  Start 11/20/17 at 21:00











FRANCES FARLEY MD Nov 23, 2017 13:26

## 2017-11-23 NOTE — PN
Date/Time of Note


Date/Time of Note


DATE: 11/23/17 


TIME: 10:15





Assessment/Plan


VTE Prophylaxis


VTE Prophylaxis Intervention:  other





Lines/Catheters


IV Catheter Type (from [facility]):  Saline Lock


Urinary Cath still in place:  No





Assessment/Plan


Assessment/Plan


- Hypokalemia- replace K, BMP am


-Gallbladder fossa abscess/bile leak. S/p drainage by radiology.  Positive HIDA 

scan. S/p ERCP and stent placement into the right hepatic duct and left hepatic 

duct on 11/20 by Dr Knapp.  Cyst fluid culture positive for VRE, continue 

Zyvox.  Dr. Pham is following in infection disease consultation


-S/p  laparoscopic cholecystectomy 11/26/17.


-Status post ERCP with stent placement status post subsequent stent removal


-Hypertension


-Hypothyroidism


-RA





Further recommendations based on clinical course.  Plan of care discussed with 

Dr. Brooks





Subjective


24 Hr Interval Summary


Free Text/Dictation


sitting up in chair, afebrile, denies any abdominal pain


Respiratory:  no complaints


Cardiovascular:  no complaints


Gastrointestinal:  no complaints


Genitourinary:  no complaints


Musculoskeletal:  no complaints





Exam/Review of Systems


Vital Signs


Vitals





 Vital Signs








  Date Time  Temp Pulse Resp B/P Pulse Ox O2 Delivery O2 Flow Rate FiO2


 


11/23/17 08:06 97.9 71 18 138/78 96   














 Intake and Output   


 


 11/22/17 11/22/17 11/23/17





 14:59 22:59 06:59


 


Intake Total 300 ml 2160 ml 1740 ml


 


Balance 300 ml 2160 ml 1740 ml











Exam


Constitutional:  alert, oriented


Cardiovascular:  nl pulses


Gastrointestinal:  other (RUQ- drain noted- intact  20 cc yeloow fluid drained 

per staff), soft, tender (at drain site )


Musculoskeletal:  nl extremities to inspection





Results


Result Diagram:  


11/23/17 0622                                                                  

              11/23/17 0622





Results 24 hrs





Laboratory Tests








Test


  11/22/17


11:39 11/22/17


17:19 11/22/17


20:39 11/23/17


06:22


 


Bedside Glucose 164   98   101   


 


White Blood Count    8.2  


 


Red Blood Count    3.29  L


 


Hemoglobin    8.8  L


 


Hematocrit    27.3  L


 


Mean Corpuscular Volume    83.0  


 


Mean Corpuscular Hemoglobin    26.7  L


 


Mean Corpuscular Hemoglobin


Concent 


  


  


  32.2  


 


 


Red Cell Distribution Width    16.9  H


 


Platelet Count    444  H


 


Mean Platelet Volume    9.1  


 


Neutrophils %    67.2  


 


Lymphocytes %    19.6  


 


Monocytes %    8.6  


 


Eosinophils %    3.3  


 


Basophils %    0.9  


 


Nucleated Red Blood Cells %    0.0  


 


Neutrophils #    5.5  


 


Lymphocytes #    1.6  


 


Monocytes #    0.7  


 


Eosinophils #    0.3  


 


Basophils #    0.1  


 


Nucleated Red Blood Cells #    0.0  


 


Sodium Level    141  


 


Potassium Level    3.2  L


 


Chloride Level    109  


 


Carbon Dioxide Level    27  


 


Anion Gap    8  


 


Blood Urea Nitrogen    3  L


 


Creatinine    0.50  


 


Glucose Level    148  #


 


Calcium Level    7.8  L














Test


  11/23/17


07:36 


  


  


 


 


Bedside Glucose 136     











Medications


Medications





 Current Medications


Hydralazine HCl (Apresoline) 10 mg Q6H  PRN IV SBP>170;  Start 11/17/17 at 11:30


Morphine Sulfate (morphine) 1 mg Q4H  PRN IV PAIN Last administered on 11/17/ 17at 21:00; Admin Dose 1 MG;  Start 11/17/17 at 12:30


Ondansetron HCl (Zofran Inj) 4 mg Q4H  PRN IV NAUSEA AND/OR VOMITING Last 

administered on 11/23/17at 05:19; Admin Dose 4 MG;  Start 11/17/17 at 13:00


Acetaminophen (Tylenol Tab) 650 mg Q4H  PRN PO PAIN AND OR ELEVATED TEMP Last 

administered on 11/21/17at 09:42; Admin Dose 650 MG;  Start 11/17/17 at 13:00


Pantoprazole (Protonix Tab) 40 mg DAILY@06 PO  Last administered on 11/23/17at 

05:29; Admin Dose 40 MG;  Start 11/18/17 at 06:00


Al Hydrox/Mg Hydrox/Simethicone (Mag-Al Plus) 30 ml Q6H  PRN PO 

GASTROINTESTINAL UPSET;  Start 11/17/17 at 20:00


Folic Acid (Folic Acid) 1 mg DAILY PO  Last administered on 11/23/17at 08:54; 

Admin Dose 1 MG;  Start 11/18/17 at 10:00


Levothyroxine Sodium (Synthroid) 50 mcg DAILY@06 PO  Last administered on 11/23/ 17at 05:30; Admin Dose 50 MCG;  Start 11/19/17 at 06:00


Valsartan (Diovan) 160 mg DAILY PO  Last administered on 11/23/17at 08:53; 

Admin Dose 160 MG;  Start 11/18/17 at 10:00


EZETIMIBE (Zetia) 10 mg DAILY PO  Last administered on 11/23/17at 08:53; Admin 

Dose 10 MG;  Start 11/18/17 at 10:00


Ibuprofen 800 mg 800 mg Q6H  PRN PO PAIN;  Start 11/18/17 at 10:00


Sodium Chloride (NS) 1,000 ml @  70 mls/hr [id number] IV  Last administered on 11/ 22/17at 17:12; Admin Dose 70 MLS/HR;  Start 11/18/17 at 12:30


Tramadol HCl (Ultram) 50 mg Q6H  PRN PO PAIN LEVEL 7-10 Last administered on 11/ 19/17at 13:46; Admin Dose 50 MG;  Start 11/18/17 at 12:30;  Status Future Hold


Cholecalciferol (Vitamin D) 4,000 unit DAILY PO  Last administered on 11/23/ 17at 08:53; Admin Dose 4,000 UNIT;  Start 11/19/17 at 09:00


Cholecalciferol 1000 unit 1,000 unit DAILY PO  Last administered on 11/23/17at 

08:53; Admin Dose 1,000 UNIT;  Start 11/19/17 at 09:00


Linezolid 300 ml @  200 mls/hr Q12H IVPB  Last administered on 11/23/17at 05:17

; Admin Dose 200 MLS/HR;  Start 11/19/17 at 17:00


Metronidazole (Flagyl 500 Mg (Pmx)) 100 ml @  100 mls/hr Q8 IVPB  Last 

administered on 11/23/17at 06:48; Admin Dose 100 MLS/HR;  Start 11/20/17 at 22:

00


Senna/Docusate Sodium (Senokot-S) 1 tab BID PO  Last administered on 11/22/17at 

09:16; Admin Dose 1 TAB;  Start 11/20/17 at 21:00











CRISPIN RAMIREZ Nov 23, 2017 10:25

## 2017-11-23 NOTE — PN
Date/Time of Note


Date/Time of Note


DATE: 11/23/17 


TIME: 23:57





Assessment/Plan


Lines/Catheters


IV Catheter Type (from Nrs):  Saline Lock


Riojas in Place (from Nrs):  No





Assessment/Plan


Chief Complaint/Hosp Course


1.  Gallbladder fossa abscess/bile leak.  +HIDA s/p ERCP with 2 stents. 


-continue drain


-abx


-pain management


2. Leukocytosis: likely 2/2 #1.  Resolved


-as above


3. Elevated alk phos: likely 2/2 #1


-as above


4. Diverticulosis


-diet/lifestyle optimization


3. Hiatal hernia: asymptomatic


-monitor


4. Atherosclerosis


-diet/exercise optimization





Thank you,


Problems:  





Subjective


24 Hr Interval Summary


Min output from drain.  Min nausea today and hypertensive. No vomiting, 

abdominal pain, fevers, chills, sob, congested cough, cp, palpitations, ha, 

dizziness, diarrhea, dysuria.





Exam/Review of Systems


Vital Signs


Vitals





 Vital Signs








  Date Time  Temp Pulse Resp B/P Pulse Ox O2 Delivery O2 Flow Rate FiO2


 


11/24/17 02:00 98.8 82 18 161/69 94   














 Intake and Output   


 


 11/23/17 11/23/17 11/24/17





 14:59 22:59 06:59


 


Intake Total 100 ml 1550 ml 1365 ml


 


Output Total  30 ml 1000 ml


 


Balance 100 ml 1520 ml 365 ml











Exam


Free Text/Dictation


Constitutional:  alert, oriented


Psych:  sad


Head:  atraumatic, normocephalic


Eyes:  nl lids, nl sclera


ENMT:  mucosa pink and moist, nl nasal mucosa & septum


Neck:  non-tender, supple


Respiratory:  clear to auscultation, normal air movement


Cardiovascular:  nl pulses, regular rate and rhythm


Gastrointestinal:  min-tender at drain site, soft, surgical scars (dry without 

drainage/bruising/discoloration), drain with dark green dc


Musculoskeletal:  nl extremities to inspection, nl gait and stance


Extremities:  normal pulses


Neurological:  nl mental status, nl speech, nl strength


Skin:  nl turgor, rash or lesions


Lymph:  nl lymph nodes





Results


Result Diagram:  


11/24/17 0457                                                                  

              11/24/17 0457














LIANET GROVER MD Nov 23, 2017 23:57

## 2017-11-24 VITALS — SYSTOLIC BLOOD PRESSURE: 161 MMHG | DIASTOLIC BLOOD PRESSURE: 70 MMHG | RESPIRATION RATE: 19 BRPM

## 2017-11-24 VITALS — RESPIRATION RATE: 19 BRPM | SYSTOLIC BLOOD PRESSURE: 167 MMHG | DIASTOLIC BLOOD PRESSURE: 71 MMHG

## 2017-11-24 VITALS — SYSTOLIC BLOOD PRESSURE: 187 MMHG | DIASTOLIC BLOOD PRESSURE: 76 MMHG | RESPIRATION RATE: 18 BRPM

## 2017-11-24 VITALS — DIASTOLIC BLOOD PRESSURE: 69 MMHG | SYSTOLIC BLOOD PRESSURE: 161 MMHG | RESPIRATION RATE: 18 BRPM

## 2017-11-24 LAB
ADD UMIC: YES
ANION GAP SERPL CALC-SCNC: 9 MMOL/L (ref 8–16)
BASOPHILS # BLD AUTO: 0.1 10^3/UL (ref 0–0.1)
BASOPHILS NFR BLD: 0.4 % (ref 0–2)
BUN SERPL-MCNC: 3 MG/DL (ref 7–20)
CALCIUM SERPL-MCNC: 7.8 MG/DL (ref 8.4–10.2)
CHLORIDE SERPL-SCNC: 106 MMOL/L (ref 97–110)
CO2 SERPL-SCNC: 26 MMOL/L (ref 21–31)
COLOR UR: YELLOW
CREAT SERPL-MCNC: 0.52 MG/DL (ref 0.44–1)
EOSINOPHIL # BLD: 0.1 10^3/UL (ref 0–0.5)
EOSINOPHIL NFR BLD: 0.5 % (ref 0–7)
ERYTHROCYTE [DISTWIDTH] IN BLOOD BY AUTOMATED COUNT: 17 % (ref 11.5–14.5)
GLUCOSE SERPL-MCNC: 166 MG/DL (ref 70–220)
GLUCOSE UR STRIP-MCNC: (no result) MG/DL
HCT VFR BLD CALC: 27.3 % (ref 37–47)
HGB BLD-MCNC: 8.6 G/DL (ref 12–16)
KETONES UR STRIP.AUTO-MCNC: (no result) MG/DL
LYMPHOCYTES # BLD AUTO: 0.8 10^3/UL (ref 0.8–2.9)
LYMPHOCYTES NFR BLD AUTO: 5.6 % (ref 15–51)
MCH RBC QN AUTO: 26.4 PG (ref 29–33)
MCHC RBC AUTO-ENTMCNC: 31.5 G/DL (ref 32–37)
MCV RBC AUTO: 83.7 FL (ref 82–101)
MONOCYTES # BLD: 0.7 10^3/UL (ref 0.3–0.9)
MONOCYTES NFR BLD: 4.8 % (ref 0–11)
NEUTROPHILS # BLD: 12.9 10^3/UL (ref 1.6–7.5)
NEUTROPHILS NFR BLD AUTO: 88.2 % (ref 39–77)
NITRITE UR QL STRIP.AUTO: NEGATIVE MG/DL
NRBC # BLD MANUAL: 0 10^3/UL (ref 0–0)
NRBC BLD AUTO-RTO: 0 /100WBC (ref 0–0)
PLATELET # BLD: 415 10^3/UL (ref 140–415)
PMV BLD AUTO: 9.3 FL (ref 7.4–10.4)
POTASSIUM SERPL-SCNC: 3.1 MMOL/L (ref 3.5–5.1)
RBC # BLD AUTO: 3.26 10^6/UL (ref 4.2–5.4)
RBC # UR AUTO: (no result) MG/DL
SODIUM SERPL-SCNC: 138 MMOL/L (ref 135–144)
UR ASCORBIC ACID: NEGATIVE MG/DL
UR BILIRUBIN (DIP): NEGATIVE MG/DL
UR CLARITY: CLEAR
UR PH (DIP): 7 (ref 5–9)
UR RBC: 3 /HPF (ref 0–5)
UR SPECIFIC GRAVITY (DIP): 1.01 (ref 1–1.03)
UR TOTAL PROTEIN (DIP): NEGATIVE MG/DL
UROBILINOGEN UR STRIP-ACNC: NEGATIVE MG/DL
WBC # BLD AUTO: 14.7 10^3/UL (ref 4.8–10.8)
WBC # UR STRIP: NEGATIVE LEU/UL

## 2017-11-24 RX ADMIN — VITAMIN D, TAB 1000IU (100/BT) SCH UNIT: 25 TAB at 08:31

## 2017-11-24 RX ADMIN — VALSARTAN SCH MG: 160 TABLET, FILM COATED ORAL at 09:46

## 2017-11-24 RX ADMIN — MORPHINE SULFATE PRN MG: 2 INJECTION, SOLUTION INTRAMUSCULAR; INTRAVENOUS at 03:56

## 2017-11-24 RX ADMIN — LEVOTHYROXINE SODIUM SCH MCG: 50 TABLET ORAL at 05:37

## 2017-11-24 RX ADMIN — DOCUSATE SODIUM AND SENNOSIDES SCH TAB: 8.6; 5 TABLET, FILM COATED ORAL at 20:31

## 2017-11-24 RX ADMIN — PANTOPRAZOLE SODIUM SCH MG: 40 TABLET, DELAYED RELEASE ORAL at 05:37

## 2017-11-24 RX ADMIN — FOLIC ACID SCH MLS/HR: 5 INJECTION, SOLUTION INTRAMUSCULAR; INTRAVENOUS; SUBCUTANEOUS at 11:09

## 2017-11-24 RX ADMIN — Medication SCH MLS/HR: at 22:32

## 2017-11-24 RX ADMIN — DEXAMETHASONE SODIUM PHOSPHATE PRN MG: 10 INJECTION, SOLUTION INTRAMUSCULAR; INTRAVENOUS at 05:59

## 2017-11-24 RX ADMIN — DOCUSATE SODIUM AND SENNOSIDES SCH TAB: 8.6; 5 TABLET, FILM COATED ORAL at 08:31

## 2017-11-24 RX ADMIN — Medication SCH MLS/HR: at 05:59

## 2017-11-24 RX ADMIN — VALSARTAN SCH MG: 160 TABLET, FILM COATED ORAL at 08:36

## 2017-11-24 RX ADMIN — EZETIMIBE SCH MG: 10 TABLET ORAL at 08:35

## 2017-11-24 RX ADMIN — LINEZOLID SCH MLS/HR: 600 INJECTION, SOLUTION INTRAVENOUS at 17:05

## 2017-11-24 RX ADMIN — LINEZOLID SCH MLS/HR: 600 INJECTION, SOLUTION INTRAVENOUS at 04:14

## 2017-11-24 RX ADMIN — IBUPROFEN PRN MG: 800 TABLET ORAL at 09:46

## 2017-11-24 RX ADMIN — DEXAMETHASONE SODIUM PHOSPHATE PRN MG: 10 INJECTION, SOLUTION INTRAMUSCULAR; INTRAVENOUS at 22:38

## 2017-11-24 RX ADMIN — FOLIC ACID SCH MG: 1 TABLET ORAL at 08:31

## 2017-11-24 RX ADMIN — ACARBOSE SCH MG: 50 TABLET ORAL at 08:33

## 2017-11-24 RX ADMIN — ACARBOSE SCH MG: 50 TABLET ORAL at 07:30

## 2017-11-24 RX ADMIN — DOCUSATE SODIUM AND SENNOSIDES SCH TAB: 8.6; 5 TABLET, FILM COATED ORAL at 08:37

## 2017-11-24 RX ADMIN — FOLIC ACID SCH MG: 1 TABLET ORAL at 08:37

## 2017-11-24 RX ADMIN — Medication SCH MLS/HR: at 16:03

## 2017-11-24 RX ADMIN — VALSARTAN SCH MG: 160 TABLET, FILM COATED ORAL at 08:31

## 2017-11-24 RX ADMIN — ACARBOSE SCH MG: 50 TABLET ORAL at 17:05

## 2017-11-24 RX ADMIN — FOLIC ACID SCH MLS/HR: 5 INJECTION, SOLUTION INTRAMUSCULAR; INTRAVENOUS; SUBCUTANEOUS at 01:25

## 2017-11-24 NOTE — CONS
Date/Time of Note


Date/Time of Note


DATE: 11/24/17 


TIME: 14:56





Assessment/Plan


Assessment/Plan


Chief Complaint/Hosp Course


- Gall bladder fossa abscess with VRE and Bacteroides; s/p CT guided drainage 11 /17/2017


- Biliary leak, s/p ERCP and stent placement into the right hepatic duct and 

left hepatic duct 11/20/2017


- S/p  laparoscopic cholecystectomy 10/25/17.


- HTN


- Hypothyroid


- PCN allergy; tolerated meropenem





Recommendations:


- Continue Linezolid (11/19/2017-) and Metronidazole (11/20/2017-) x 14 days at 

least


- F/u MRCP results


- Trend WBC and check procalcitonin


Management d/w JACE Baker and Dr. Pham


Problems:  





Consultation Date/Type/Reason


Admit Date/Time


Nov 16, 2017 at 14:00


Initial Consult Date


11/20/17


Type of Consultation:  Infectious Disease


Referring Provider:  VIOLETTE GALDAMEZ MD





24 HR Interval Summary


Free Text/Dictation


Chart reviewed.


Pt went for MRCP d/t elevated WBC and remains afebrile per d/w nursing.





Exam/Review of Systems


Vital Signs


Vitals





 Vital Signs








  Date Time  Temp Pulse Resp B/P Pulse Ox O2 Delivery O2 Flow Rate FiO2


 


11/24/17 08:00 98.3 82 19 167/71 92   














 Intake and Output   


 


 11/23/17 11/23/17 11/24/17





 15:00 23:00 07:00


 


Intake Total 100 ml 1550 ml 1465 ml


 


Output Total  30 ml 1000 ml


 


Balance 100 ml 1520 ml 465 ml











Exam


Unable to perform as pt is not in room





Results


Result Diagram:  


11/24/17 0457                                                                  

              11/24/17 0457





Results 24 hrs





Laboratory Tests








Test


  11/23/17


17:08 11/23/17


20:28 11/24/17


04:57 11/24/17


08:32


 


Bedside Glucose 147   143    125  


 


White Blood Count   14.7  #H 


 


Red Blood Count   3.26  L 


 


Hemoglobin   8.6  L 


 


Hematocrit   27.3  L 


 


Mean Corpuscular Volume   83.7   


 


Mean Corpuscular Hemoglobin   26.4  L 


 


Mean Corpuscular Hemoglobin


Concent 


  


  31.5  L


  


 


 


Red Cell Distribution Width   17.0  H 


 


Platelet Count   415   


 


Mean Platelet Volume   9.3   


 


Neutrophils %   88.2  H 


 


Lymphocytes %   5.6  L 


 


Monocytes %   4.8   


 


Eosinophils %   0.5   


 


Basophils %   0.4   


 


Nucleated Red Blood Cells %   0.0   


 


Neutrophils #   12.9  H 


 


Lymphocytes #   0.8   


 


Monocytes #   0.7   


 


Eosinophils #   0.1   


 


Basophils #   0.1   


 


Nucleated Red Blood Cells #   0.0   


 


Sodium Level   138   


 


Potassium Level   3.1  L 


 


Chloride Level   106   


 


Carbon Dioxide Level   26   


 


Anion Gap   9   


 


Blood Urea Nitrogen   3  L 


 


Creatinine   0.52   


 


Glucose Level   166   


 


Calcium Level   7.8  L 














Test


  11/24/17


11:10 


  


  


 


 


Bedside Glucose 146     











Medications


Medications





 Current Medications


Hydralazine HCl (Apresoline) 10 mg Q6H  PRN IV SBP>170;  Start 11/17/17 at 11:30


Morphine Sulfate (morphine) 1 mg Q4H  PRN IV PAIN Last administered on 11/24/ 17at 03:56; Admin Dose 1 MG;  Start 11/17/17 at 12:30


Ondansetron HCl (Zofran Inj) 4 mg Q4H  PRN IV NAUSEA AND/OR VOMITING Last 

administered on 11/24/17at 05:59; Admin Dose 4 MG;  Start 11/17/17 at 13:00


Acetaminophen (Tylenol Tab) 650 mg Q4H  PRN PO PAIN AND OR ELEVATED TEMP Last 

administered on 11/24/17at 09:55; Admin Dose 650 MG;  Start 11/17/17 at 13:00


Pantoprazole (Protonix Tab) 40 mg DAILY@06 PO  Last administered on 11/24/17at 

05:37; Admin Dose 40 MG;  Start 11/18/17 at 06:00


Al Hydrox/Mg Hydrox/Simethicone (Mag-Al Plus) 30 ml Q6H  PRN PO 

GASTROINTESTINAL UPSET;  Start 11/17/17 at 20:00


Folic Acid (Folic Acid) 1 mg DAILY PO  Last administered on 11/23/17at 08:54; 

Admin Dose 1 MG;  Start 11/18/17 at 10:00


Levothyroxine Sodium (Synthroid) 50 mcg DAILY@06 PO  Last administered on 11/24/ 17at 05:37; Admin Dose 50 MCG;  Start 11/19/17 at 06:00


Valsartan (Diovan) 160 mg DAILY PO  Last administered on 11/24/17at 09:46; 

Admin Dose 160 MG;  Start 11/18/17 at 10:00


EZETIMIBE (Zetia) 10 mg DAILY PO  Last administered on 11/23/17at 08:53; Admin 

Dose 10 MG;  Start 11/18/17 at 10:00


Ibuprofen 800 mg 800 mg Q6H  PRN PO PAIN;  Start 11/18/17 at 10:00


Sodium Chloride (NS) 1,000 ml @  70 mls/hr [id number] IV  Last administered on 11/ 24/17at 11:09; Admin Dose 70 MLS/HR;  Start 11/18/17 at 12:30


Tramadol HCl (Ultram) 50 mg Q6H  PRN PO PAIN LEVEL 7-10 Last administered on 11/ 19/17at 13:46; Admin Dose 50 MG;  Start 11/18/17 at 12:30;  Status Future Hold


Cholecalciferol (Vitamin D) 4,000 unit DAILY PO  Last administered on 11/24/ 17at 08:30; Admin Dose 4,000 UNIT;  Start 11/19/17 at 09:00


Cholecalciferol 1000 unit 1,000 unit DAILY PO  Last administered on 11/24/17at 

08:31; Admin Dose 1,000 UNIT;  Start 11/19/17 at 09:00


Linezolid 300 ml @  200 mls/hr Q12H IVPB  Last administered on 11/24/17at 04:14

; Admin Dose 200 MLS/HR;  Start 11/19/17 at 17:00


Metronidazole (Flagyl 500 Mg (Pmx)) 100 ml @  100 mls/hr Q8 IVPB  Last 

administered on 11/24/17at 05:59; Admin Dose 100 MLS/HR;  Start 11/20/17 at 22:

00


Senna/Docusate Sodium (Senokot-S) 1 tab BID PO  Last administered on 11/23/17at 

20:40; Admin Dose 1 TAB;  Start 11/20/17 at 21:00











LEE RUIZ NP Nov 24, 2017 14:56

## 2017-11-24 NOTE — PN
Date/Time of Note


Date/Time of Note


DATE: 11/24/17 


TIME: 12:41





Assessment/Plan


VTE Prophylaxis


VTE Prophylaxis Intervention:  SCD's





Lines/Catheters


IV Catheter Type (from [facility]):  Peripheral IV


Urinary Cath still in place:  No





Assessment/Plan


Chief Complaint/Hosp Course


Patient was leukocytosis white blood cells increased to 14,000, patient is 

currently on Zyvox and Flagyl IV, patient's complains of poor appetite however 

denies any nausea and vomiting patient's complains of right flank pain.  Will 

obtain a urine and blood cultures. 


Assessment/Plan


-Gallbladder fossa abscess/bile leak. S/p drainage by radiology.  Positive HIDA 

scan. S/p ERCP and stent placement into the right hepatic duct and left hepatic 

duct on 11/20 by Dr Knapp.  Abdominal fluid culture positive for VRE, continue 

Zyvox.  Dr. Pham is following in infection disease consultation


-S/p  laparoscopic cholecystectomy 11/26/17.  Dr. Edwards is following in 

surgical consultation.


-Status post ERCP with stent placement status post subsequent stent removal


-Hypertension


-Hypothyroidism


-RA





Further recommendations based on clinical course.  Plan of care discussed with 

Dr. Brooks


Problems:  





Exam/Review of Systems


Vital Signs


Vitals





 Vital Signs








  Date Time  Temp Pulse Resp B/P Pulse Ox O2 Delivery O2 Flow Rate FiO2


 


11/24/17 02:00 98.8 82 18 161/69 94   














 Intake and Output   


 


 11/23/17 11/23/17 11/24/17





 14:59 22:59 06:59


 


Intake Total 100 ml 1550 ml 1365 ml


 


Output Total  30 ml 1000 ml


 


Balance 100 ml 1520 ml 365 ml











Exam


Constitutional:  alert, oriented


Neck:  supple


Respiratory:  normal air movement


Cardiovascular:  nl pulses


Gastrointestinal:  other (Right upper quadrant drain), soft


Musculoskeletal:  nl extremities to inspection


Extremities:  normal pulses


Neurological:  nl mental status





Results


Result Diagram:  


11/24/17 0457                                                                  

              11/24/17 0457





Results 24 hrs





Laboratory Tests








Test


  11/23/17


17:08 11/23/17


20:28 11/24/17


04:57 11/24/17


08:32


 


Bedside Glucose 147   143    125  


 


White Blood Count   14.7  #H 


 


Red Blood Count   3.26  L 


 


Hemoglobin   8.6  L 


 


Hematocrit   27.3  L 


 


Mean Corpuscular Volume   83.7   


 


Mean Corpuscular Hemoglobin   26.4  L 


 


Mean Corpuscular Hemoglobin


Concent 


  


  31.5  L


  


 


 


Red Cell Distribution Width   17.0  H 


 


Platelet Count   415   


 


Mean Platelet Volume   9.3   


 


Neutrophils %   88.2  H 


 


Lymphocytes %   5.6  L 


 


Monocytes %   4.8   


 


Eosinophils %   0.5   


 


Basophils %   0.4   


 


Nucleated Red Blood Cells %   0.0   


 


Neutrophils #   12.9  H 


 


Lymphocytes #   0.8   


 


Monocytes #   0.7   


 


Eosinophils #   0.1   


 


Basophils #   0.1   


 


Nucleated Red Blood Cells #   0.0   


 


Sodium Level   138   


 


Potassium Level   3.1  L 


 


Chloride Level   106   


 


Carbon Dioxide Level   26   


 


Anion Gap   9   


 


Blood Urea Nitrogen   3  L 


 


Creatinine   0.52   


 


Glucose Level   166   


 


Calcium Level   7.8  L 














Test


  11/24/17


11:10 


  


  


 


 


Bedside Glucose 146     











Medications


Medications





 Current Medications


Hydralazine HCl (Apresoline) 10 mg Q6H  PRN IV SBP>170;  Start 11/17/17 at 11:30


Morphine Sulfate (morphine) 1 mg Q4H  PRN IV PAIN Last administered on 11/24/ 17at 03:56; Admin Dose 1 MG;  Start 11/17/17 at 12:30


Ondansetron HCl (Zofran Inj) 4 mg Q4H  PRN IV NAUSEA AND/OR VOMITING Last 

administered on 11/24/17at 05:59; Admin Dose 4 MG;  Start 11/17/17 at 13:00


Acetaminophen (Tylenol Tab) 650 mg Q4H  PRN PO PAIN AND OR ELEVATED TEMP Last 

administered on 11/24/17at 09:55; Admin Dose 650 MG;  Start 11/17/17 at 13:00


Pantoprazole (Protonix Tab) 40 mg DAILY@06 PO  Last administered on 11/24/17at 

05:37; Admin Dose 40 MG;  Start 11/18/17 at 06:00


Al Hydrox/Mg Hydrox/Simethicone (Mag-Al Plus) 30 ml Q6H  PRN PO 

GASTROINTESTINAL UPSET;  Start 11/17/17 at 20:00


Folic Acid (Folic Acid) 1 mg DAILY PO  Last administered on 11/23/17at 08:54; 

Admin Dose 1 MG;  Start 11/18/17 at 10:00


Levothyroxine Sodium (Synthroid) 50 mcg DAILY@06 PO  Last administered on 11/24/ 17at 05:37; Admin Dose 50 MCG;  Start 11/19/17 at 06:00


Valsartan (Diovan) 160 mg DAILY PO  Last administered on 11/24/17at 09:46; 

Admin Dose 160 MG;  Start 11/18/17 at 10:00


EZETIMIBE (Zetia) 10 mg DAILY PO  Last administered on 11/23/17at 08:53; Admin 

Dose 10 MG;  Start 11/18/17 at 10:00


Ibuprofen 800 mg 800 mg Q6H  PRN PO PAIN;  Start 11/18/17 at 10:00


Sodium Chloride (NS) 1,000 ml @  70 mls/hr [id number] IV  Last administered on 11/ 24/17at 11:09; Admin Dose 70 MLS/HR;  Start 11/18/17 at 12:30


Tramadol HCl (Ultram) 50 mg Q6H  PRN PO PAIN LEVEL 7-10 Last administered on 11/ 19/17at 13:46; Admin Dose 50 MG;  Start 11/18/17 at 12:30;  Status Future Hold


Cholecalciferol (Vitamin D) 4,000 unit DAILY PO  Last administered on 11/24/ 17at 08:30; Admin Dose 4,000 UNIT;  Start 11/19/17 at 09:00


Cholecalciferol 1000 unit 1,000 unit DAILY PO  Last administered on 11/24/17at 

08:31; Admin Dose 1,000 UNIT;  Start 11/19/17 at 09:00


Linezolid 300 ml @  200 mls/hr Q12H IVPB  Last administered on 11/24/17at 04:14

; Admin Dose 200 MLS/HR;  Start 11/19/17 at 17:00


Metronidazole (Flagyl 500 Mg (Pmx)) 100 ml @  100 mls/hr Q8 IVPB  Last 

administered on 11/24/17at 05:59; Admin Dose 100 MLS/HR;  Start 11/20/17 at 22:

00


Senna/Docusate Sodium (Senokot-S) 1 tab BID PO  Last administered on 11/23/17at 

20:40; Admin Dose 1 TAB;  Start 11/20/17 at 21:00











RADHA DE JESUS Nov 24, 2017 12:44

## 2017-11-24 NOTE — CONS
DATE OF ADMISSION: 11/16/2017

DATE OF CONSULTATION:  11/24/2017

 

 

 

At this time, patient is not having any complaints.  She has a history of laparoscopic cholecystecto
my and a biliary leak, ERCP was done and 2 stents were placed, one into the left hepatic duct and on
e in the right hepatic duct a few days ago because of the HIDA scan showing evidence of persistent b
iliary leak.

 

PHYSICAL EXAMINATION:

ABDOMEN:  Soft.

 

She has the drainage tube that was put in to drain the biloma continues to show evidence of bile at 
least 20 mL a day.

 

CLINICAL IMPRESSION:  Persistent biliary leak after placement of 2, biliary stents.

 

PLAN:  We will discuss with Dr. Edwards.  The patient had developed fever and white count elevation 
and because of that MRCP has been ordered.  We will wait and see what the MRCP will show based on th
at, we will make further plans.

 

 

Dictated By: LILLIAM ANTONIO/GRAHAM

DD:    11/24/2017 13:08:27

DT:    11/24/2017 13:59:10

Conf#: [id number]

DID#:  [id number]

## 2017-11-25 VITALS — DIASTOLIC BLOOD PRESSURE: 70 MMHG | HEART RATE: 87 BPM | SYSTOLIC BLOOD PRESSURE: 160 MMHG

## 2017-11-25 VITALS — SYSTOLIC BLOOD PRESSURE: 187 MMHG | DIASTOLIC BLOOD PRESSURE: 74 MMHG | RESPIRATION RATE: 16 BRPM

## 2017-11-25 VITALS — DIASTOLIC BLOOD PRESSURE: 67 MMHG | RESPIRATION RATE: 20 BRPM | SYSTOLIC BLOOD PRESSURE: 133 MMHG

## 2017-11-25 VITALS — SYSTOLIC BLOOD PRESSURE: 152 MMHG | RESPIRATION RATE: 17 BRPM | DIASTOLIC BLOOD PRESSURE: 65 MMHG

## 2017-11-25 VITALS — SYSTOLIC BLOOD PRESSURE: 145 MMHG | RESPIRATION RATE: 19 BRPM | DIASTOLIC BLOOD PRESSURE: 65 MMHG

## 2017-11-25 LAB
ALBUMIN SERPL-MCNC: 2.4 G/DL (ref 3.3–4.9)
ALBUMIN/GLOB SERPL: 0.72 {RATIO}
ALP SERPL-CCNC: 132 IU/L (ref 42–121)
ALT SERPL-CCNC: 34 IU/L (ref 13–69)
ANION GAP SERPL CALC-SCNC: 10 MMOL/L (ref 8–16)
AST SERPL-CCNC: 14 IU/L (ref 15–46)
BASOPHILS # BLD AUTO: 0.1 10^3/UL (ref 0–0.1)
BASOPHILS NFR BLD: 0.5 % (ref 0–2)
BILIRUB DIRECT SERPL-MCNC: 0 MG/DL (ref 0–0.2)
BILIRUB SERPL-MCNC: 0.4 MG/DL (ref 0.2–1.3)
BUN SERPL-MCNC: 4 MG/DL (ref 7–20)
CALCIUM SERPL-MCNC: 7.7 MG/DL (ref 8.4–10.2)
CHLORIDE SERPL-SCNC: 108 MMOL/L (ref 97–110)
CO2 SERPL-SCNC: 24 MMOL/L (ref 21–31)
CREAT SERPL-MCNC: 0.5 MG/DL (ref 0.44–1)
EOSINOPHIL # BLD: 0.3 10^3/UL (ref 0–0.5)
EOSINOPHIL NFR BLD: 2.4 % (ref 0–7)
ERYTHROCYTE [DISTWIDTH] IN BLOOD BY AUTOMATED COUNT: 17 % (ref 11.5–14.5)
GLOBULIN SER-MCNC: 3.3 G/DL (ref 1.3–3.2)
GLUCOSE SERPL-MCNC: 108 MG/DL (ref 70–220)
HCT VFR BLD CALC: 25.8 % (ref 37–47)
HGB BLD-MCNC: 8.2 G/DL (ref 12–16)
LYMPHOCYTES # BLD AUTO: 1.3 10^3/UL (ref 0.8–2.9)
LYMPHOCYTES NFR BLD AUTO: 9.6 % (ref 15–51)
MCH RBC QN AUTO: 26.2 PG (ref 29–33)
MCHC RBC AUTO-ENTMCNC: 31.8 G/DL (ref 32–37)
MCV RBC AUTO: 82.4 FL (ref 82–101)
MONOCYTES # BLD: 0.8 10^3/UL (ref 0.3–0.9)
MONOCYTES NFR BLD: 6.4 % (ref 0–11)
NEUTROPHILS # BLD: 10.6 10^3/UL (ref 1.6–7.5)
NEUTROPHILS NFR BLD AUTO: 80.9 % (ref 39–77)
NRBC # BLD MANUAL: 0 10^3/UL (ref 0–0)
NRBC BLD AUTO-RTO: 0 /100WBC (ref 0–0)
PLATELET # BLD: 384 10^3/UL (ref 140–415)
PMV BLD AUTO: 8.9 FL (ref 7.4–10.4)
POTASSIUM SERPL-SCNC: 3.4 MMOL/L (ref 3.5–5.1)
PROT SERPL-MCNC: 5.7 G/DL (ref 6.1–8.1)
RBC # BLD AUTO: 3.13 10^6/UL (ref 4.2–5.4)
SODIUM SERPL-SCNC: 139 MMOL/L (ref 135–144)
WBC # BLD AUTO: 13.1 10^3/UL (ref 4.8–10.8)

## 2017-11-25 RX ADMIN — DOCUSATE SODIUM AND SENNOSIDES SCH TAB: 8.6; 5 TABLET, FILM COATED ORAL at 21:00

## 2017-11-25 RX ADMIN — LINEZOLID SCH MLS/HR: 600 INJECTION, SOLUTION INTRAVENOUS at 05:16

## 2017-11-25 RX ADMIN — Medication SCH MLS/HR: at 13:39

## 2017-11-25 RX ADMIN — DEXAMETHASONE SODIUM PHOSPHATE PRN MG: 10 INJECTION, SOLUTION INTRAMUSCULAR; INTRAVENOUS at 08:50

## 2017-11-25 RX ADMIN — DEXAMETHASONE SODIUM PHOSPHATE PRN MG: 10 INJECTION, SOLUTION INTRAMUSCULAR; INTRAVENOUS at 21:17

## 2017-11-25 RX ADMIN — PANTOPRAZOLE SODIUM SCH MG: 40 TABLET, DELAYED RELEASE ORAL at 05:16

## 2017-11-25 RX ADMIN — ACARBOSE SCH MG: 50 TABLET ORAL at 17:14

## 2017-11-25 RX ADMIN — LINEZOLID SCH MLS/HR: 600 INJECTION, SOLUTION INTRAVENOUS at 17:08

## 2017-11-25 RX ADMIN — FOLIC ACID SCH MLS/HR: 5 INJECTION, SOLUTION INTRAMUSCULAR; INTRAVENOUS; SUBCUTANEOUS at 06:50

## 2017-11-25 RX ADMIN — FOLIC ACID SCH MG: 1 TABLET ORAL at 08:54

## 2017-11-25 RX ADMIN — DOCUSATE SODIUM AND SENNOSIDES SCH TAB: 8.6; 5 TABLET, FILM COATED ORAL at 08:54

## 2017-11-25 RX ADMIN — VALSARTAN SCH MG: 160 TABLET, FILM COATED ORAL at 08:51

## 2017-11-25 RX ADMIN — EZETIMIBE SCH MG: 10 TABLET ORAL at 08:54

## 2017-11-25 RX ADMIN — FOLIC ACID SCH MLS/HR: 5 INJECTION, SOLUTION INTRAMUSCULAR; INTRAVENOUS; SUBCUTANEOUS at 01:48

## 2017-11-25 RX ADMIN — LEVOTHYROXINE SODIUM SCH MCG: 50 TABLET ORAL at 06:50

## 2017-11-25 RX ADMIN — ACARBOSE SCH MG: 50 TABLET ORAL at 08:50

## 2017-11-25 RX ADMIN — Medication SCH MLS/HR: at 23:03

## 2017-11-25 RX ADMIN — Medication SCH MLS/HR: at 06:49

## 2017-11-25 RX ADMIN — VITAMIN D, TAB 1000IU (100/BT) SCH UNIT: 25 TAB at 08:54

## 2017-11-25 NOTE — PN
Date/Time of Note


Date/Time of Note


DATE: 11/25/17 


TIME: 12:49





Assessment/Plan


VTE Prophylaxis


VTE Prophylaxis Intervention:  other





Lines/Catheters


IV Catheter Type (from [facility]):  Peripheral IV


Urinary Cath still in place:  No





Assessment/Plan


Chief Complaint/Hosp Course


-Gallbladder fossa abscess/bile leak. S/p drainage by radiology.  Positive HIDA 

scan. S/p ERCP and stent placement into the right hepatic duct and left hepatic 

duct on 11/20 by Dr Knapp.  Abdominal fluid culture positive for VRE, continue 

Zyvox.  Dr. Pham is following in infection disease consultation


-S/p  laparoscopic cholecystectomy 11/26/17.  Dr. Edwards is following in 

surgical consultation.


-Status post ERCP with stent placement status post subsequent stent removal


-Hypertension


-Hypothyroidism


-RA


Problems:  





Subjective


24 Hr Interval Summary


Free Text/Dictation


Patient still have abdominal pain





Exam/Review of Systems


Vital Signs


Vitals





 Vital Signs








  Date Time  Temp Pulse Resp B/P Pulse Ox O2 Delivery O2 Flow Rate FiO2


 


11/25/17 09:47 98.4 74 17 152/65 96   














 Intake and Output   


 


 11/24/17 11/24/17 11/25/17





 14:59 22:59 06:59


 


Intake Total 520 ml 920 ml 1480 ml


 


Output Total  5 ml 905 ml


 


Balance 520 ml 915 ml 575 ml











Exam


Constitutional:  well developed


Head:  atraumatic, normocephalic


Neck:  supple


Respiratory:  clear to auscultation


Cardiovascular:  regular rate and rhythm


Gastrointestinal:  non-tender, soft


Extremities:  normal pulses





Results


Result Diagram:  


11/25/17 0510                                                                  

              11/25/17 0510





Results 24 hrs





Laboratory Tests








Test


  11/24/17


12:55 11/24/17


17:00 11/24/17


20:36 11/25/17


05:10


 


Urine Color YELLOW     


 


Urine Clarity CLEAR     


 


Urine pH 7.0     


 


Urine Specific Gravity 1.009     


 


Urine Ketones 1+  H   


 


Urine Nitrite NEGATIVE     


 


Urine Bilirubin NEGATIVE     


 


Urine Urobilinogen NEGATIVE     


 


Urine Leukocyte Esterase NEGATIVE     


 


Urine Microscopic RBC 3     


 


Urine Microscopic WBC 1     


 


Urine Hemoglobin 1+  H   


 


Urine Glucose 1+  H   


 


Urine Total Protein NEGATIVE     


 


Bedside Glucose  123   133   


 


White Blood Count    13.1  H


 


Red Blood Count    3.13  L


 


Hemoglobin    8.2  L


 


Hematocrit    25.8  L


 


Mean Corpuscular Volume    82.4  


 


Mean Corpuscular Hemoglobin    26.2  L


 


Mean Corpuscular Hemoglobin


Concent 


  


  


  31.8  L


 


 


Red Cell Distribution Width    17.0  H


 


Platelet Count    384  


 


Mean Platelet Volume    8.9  


 


Neutrophils %    80.9  H


 


Lymphocytes %    9.6  L


 


Monocytes %    6.4  


 


Eosinophils %    2.4  


 


Basophils %    0.5  


 


Nucleated Red Blood Cells %    0.0  


 


Neutrophils #    10.6  H


 


Lymphocytes #    1.3  


 


Monocytes #    0.8  


 


Eosinophils #    0.3  


 


Basophils #    0.1  


 


Nucleated Red Blood Cells #    0.0  


 


Sodium Level    139  


 


Potassium Level    3.4  L


 


Chloride Level    108  


 


Carbon Dioxide Level    24  


 


Anion Gap    10  


 


Blood Urea Nitrogen    4  L


 


Creatinine    0.50  


 


Glucose Level    108  #


 


Calcium Level    7.7  L


 


Total Bilirubin    0.4  


 


Direct Bilirubin    0.00  


 


Indirect Bilirubin    0.4  


 


Aspartate Amino Transf


(AST/SGOT) 


  


  


  14  L


 


 


Alanine Aminotransferase


(ALT/SGPT) 


  


  


  34  


 


 


Alkaline Phosphatase    132  H


 


Total Protein    5.7  L


 


Albumin    2.4  L


 


Globulin    3.30  H


 


Albumin/Globulin Ratio    0.72  














Test


  11/25/17


08:45 11/25/17


11:11 


  


 


 


Bedside Glucose 113   111    











Medications


Medications





 Current Medications


Hydralazine HCl (Apresoline) 10 mg Q6H  PRN IV SBP>170;  Start 11/17/17 at 11:30


Morphine Sulfate (morphine) 1 mg Q4H  PRN IV PAIN Last administered on 11/24/ 17at 03:56; Admin Dose 1 MG;  Start 11/17/17 at 12:30


Ondansetron HCl (Zofran Inj) 4 mg Q4H  PRN IV NAUSEA AND/OR VOMITING Last 

administered on 11/25/17at 08:50; Admin Dose 4 MG;  Start 11/17/17 at 13:00


Acetaminophen (Tylenol Tab) 650 mg Q4H  PRN PO PAIN AND OR ELEVATED TEMP Last 

administered on 11/25/17at 03:20; Admin Dose 650 MG;  Start 11/17/17 at 13:00


Pantoprazole (Protonix Tab) 40 mg DAILY@06 PO  Last administered on 11/25/17at 

05:16; Admin Dose 40 MG;  Start 11/18/17 at 06:00


Al Hydrox/Mg Hydrox/Simethicone (Mag-Al Plus) 30 ml Q6H  PRN PO 

GASTROINTESTINAL UPSET;  Start 11/17/17 at 20:00


Folic Acid (Folic Acid) 1 mg DAILY PO  Last administered on 11/23/17at 08:54; 

Admin Dose 1 MG;  Start 11/18/17 at 10:00


Levothyroxine Sodium (Synthroid) 50 mcg DAILY@06 PO  Last administered on 11/25/ 17at 06:50; Admin Dose 50 MCG;  Start 11/19/17 at 06:00


Valsartan (Diovan) 160 mg DAILY PO  Last administered on 11/25/17at 08:51; 

Admin Dose 160 MG;  Start 11/18/17 at 10:00


EZETIMIBE (Zetia) 10 mg DAILY PO  Last administered on 11/23/17at 08:53; Admin 

Dose 10 MG;  Start 11/18/17 at 10:00


Ibuprofen 800 mg 800 mg Q6H  PRN PO PAIN;  Start 11/18/17 at 10:00


Sodium Chloride (NS) 1,000 ml @  70 mls/hr [id number] IV  Last administered on 11/ 25/17at 06:50; Admin Dose 70 MLS/HR;  Start 11/18/17 at 12:30


Tramadol HCl (Ultram) 50 mg Q6H  PRN PO PAIN LEVEL 7-10 Last administered on 11/ 19/17at 13:46; Admin Dose 50 MG;  Start 11/18/17 at 12:30;  Status Future Hold


Cholecalciferol (Vitamin D) 4,000 unit DAILY PO  Last administered on 11/24/ 17at 08:30; Admin Dose 4,000 UNIT;  Start 11/19/17 at 09:00


Cholecalciferol 1000 unit 1,000 unit DAILY PO  Last administered on 11/24/17at 

08:31; Admin Dose 1,000 UNIT;  Start 11/19/17 at 09:00


Linezolid 300 ml @  200 mls/hr Q12H IVPB  Last administered on 11/25/17at 05:16

; Admin Dose 200 MLS/HR;  Start 11/19/17 at 17:00


Metronidazole (Flagyl 500 Mg (Pmx)) 100 ml @  100 mls/hr Q8 IVPB  Last 

administered on 11/25/17at 06:49; Admin Dose 100 MLS/HR;  Start 11/20/17 at 22:

00


Senna/Docusate Sodium (Senokot-S) 1 tab BID PO  Last administered on 11/23/17at 

20:40; Admin Dose 1 TAB;  Start 11/20/17 at 21:00


Zolpidem Tartrate (Ambien) 5 mg HS  PRN PO INSOMNIA;  Start 11/24/17 at 19:00











LURDES PRICE Nov 25, 2017 12:49

## 2017-11-25 NOTE — PN
Date/Time of Note


Date/Time of Note


DATE: 11/25/17 


TIME: 18:10





Assessment/Plan


Lines/Catheters


IV Catheter Type (from [facility]):  Peripheral IV


Roijas in Place (from [facility]):  No





Assessment/Plan


Chief Complaint/Hosp Course


1.  Gallbladder fossa abscess/bile leak.  +HIDA s/p ERCP with 2 stents.  

Leukocytosis ? etiology.  MRCP noted.


-pan cx


-continue drain


-abx


-pain management


-pulmonary toilette


-?pulmonary consult


-check amylase/lipase


-other labs pending per ID


2. Leukocytosis: Recurred but slowly improving.


-as above


3. Elevated alk phos: likely 2/2 #1.  Slowly improving.


-as above


4. Diverticulosis


-diet/lifestyle optimization


3. Hiatal hernia: asymptomatic


-monitor


4. Atherosclerosis


-diet/exercise optimization





Thank you,


Problems:  





Subjective


24 Hr Interval Summary


MRCP non diagnostic except for bilateral pleural effusion, otherwise, fluid 

collection almost fully resolved.  Leukocytosis slightly improved (? etiology).

  Epigastric pain.  Min output from drain.  Min nausea. No vomiting, fevers, 

chills, sob, congested cough, cp, palpitations, ha, dizziness, diarrhea, 

dysuria.





Exam/Review of Systems


Vital Signs


Vitals





 Vital Signs








  Date Time  Temp Pulse Resp B/P Pulse Ox O2 Delivery O2 Flow Rate FiO2


 


11/25/17 16:25 98.7 77 16 187/74 99   














 Intake and Output   


 


 11/24/17 11/24/17 11/25/17





 14:59 22:59 06:59


 


Intake Total 520 ml 920 ml 1480 ml


 


Output Total  5 ml 905 ml


 


Balance 520 ml 915 ml 575 ml











Exam


Free Text/Dictation


Constitutional:  alert, oriented


Psych:  sad


Head:  atraumatic, normocephalic


Eyes:  nl lids, nl sclera


ENMT:  mucosa pink and moist, nl nasal mucosa & septum


Neck:  non-tender, supple


Respiratory:  clear to auscultation, normal air movement


Cardiovascular:  nl pulses, regular rate and rhythm


Gastrointestinal:  min-tender at drain site, soft, surgical scars (dry without 

drainage/bruising/discoloration), drain with dark green dc


Musculoskeletal:  nl extremities to inspection, nl gait and stance


Extremities:  normal pulses


Neurological:  nl mental status, nl speech, nl strength


Skin:  nl turgor, rash or lesions


Lymph:  nl lymph nodes





Results


Free Text/Dictation


MRCP:


1.  Gallbladder is surgically absent. Percutaneous drainage catheter tip 

remains within the gallbladder fossa. There has been near-complete interval 

resolution of previously seen complex collection in this area.  


2.  No biliary dilatation or evidence of choledocholithiasis is identified.


3.  Mild right and small left pleural effusions are noted, increased from the 

prior CT.


4.  Moderate to large hiatal hernia is again noted, grossly unchanged.


5.  Colonic diverticulosis is seen without diverticulitis.


Result Diagram:  


11/25/17 0510                                                                  

              11/25/17 0510














LIANET GROVER MD Nov 25, 2017 18:14

## 2017-11-25 NOTE — CONS
Date/Time of Note


Date/Time of Note


DATE: 11/25/17 


TIME: 08:09





Assessment/Plan


Assessment/Plan


Chief Complaint/Hosp Course


EMR reviewed


np note to follow shortly


complete course of abx


await procalcitonin


monitor fever,wbc,lft,plts


Problems:  





Consultation Date/Type/Reason


Admit Date/Time


Nov 16, 2017 at 14:00


Initial Consult Date


11/20/17


Type of Consultation:  Infectious Disease


Referring Provider:  VIOLETTE GALDAMEZ MD





Exam/Review of Systems


Vital Signs


Vitals





 Vital Signs








  Date Time  Temp Pulse Resp B/P Pulse Ox O2 Delivery O2 Flow Rate FiO2


 


11/25/17 02:00 99.0 85 19 145/65 96   














 Intake and Output   


 


 11/24/17 11/24/17 11/25/17





 15:00 23:00 07:00


 


Intake Total 420 ml 920 ml 1480 ml


 


Output Total  5 ml 905 ml


 


Balance 420 ml 915 ml 575 ml











Results


Result Diagram:  


11/25/17 0510                                                                  

              11/25/17 0510





Results 24 hrs





Laboratory Tests








Test


  11/24/17


08:32 11/24/17


11:10 11/24/17


12:55 11/24/17


17:00


 


Bedside Glucose 125   146    123  


 


Urine Color   YELLOW   


 


Urine Clarity   CLEAR   


 


Urine pH   7.0   


 


Urine Specific Gravity   1.009   


 


Urine Ketones   1+  H 


 


Urine Nitrite   NEGATIVE   


 


Urine Bilirubin   NEGATIVE   


 


Urine Urobilinogen   NEGATIVE   


 


Urine Leukocyte Esterase   NEGATIVE   


 


Urine Microscopic RBC   3   


 


Urine Microscopic WBC   1   


 


Urine Hemoglobin   1+  H 


 


Urine Glucose   1+  H 


 


Urine Total Protein   NEGATIVE   














Test


  11/24/17


20:36 11/25/17


05:10 


  


 


 


Bedside Glucose 133     


 


White Blood Count  13.1  H  


 


Red Blood Count  3.13  L  


 


Hemoglobin  8.2  L  


 


Hematocrit  25.8  L  


 


Mean Corpuscular Volume  82.4    


 


Mean Corpuscular Hemoglobin  26.2  L  


 


Mean Corpuscular Hemoglobin


Concent 


  31.8  L


  


  


 


 


Red Cell Distribution Width  17.0  H  


 


Platelet Count  384    


 


Mean Platelet Volume  8.9    


 


Neutrophils %  80.9  H  


 


Lymphocytes %  9.6  L  


 


Monocytes %  6.4    


 


Eosinophils %  2.4    


 


Basophils %  0.5    


 


Nucleated Red Blood Cells %  0.0    


 


Neutrophils #  10.6  H  


 


Lymphocytes #  1.3    


 


Monocytes #  0.8    


 


Eosinophils #  0.3    


 


Basophils #  0.1    


 


Nucleated Red Blood Cells #  0.0    


 


Sodium Level  139    


 


Potassium Level  3.4  L  


 


Chloride Level  108    


 


Carbon Dioxide Level  24    


 


Anion Gap  10    


 


Blood Urea Nitrogen  4  L  


 


Creatinine  0.50    


 


Glucose Level  108  #  


 


Calcium Level  7.7  L  


 


Total Bilirubin  0.4    


 


Direct Bilirubin  0.00    


 


Indirect Bilirubin  0.4    


 


Aspartate Amino Transf


(AST/SGOT) 


  14  L


  


  


 


 


Alanine Aminotransferase


(ALT/SGPT) 


  34  


  


  


 


 


Alkaline Phosphatase  132  H  


 


Total Protein  5.7  L  


 


Albumin  2.4  L  


 


Globulin  3.30  H  


 


Albumin/Globulin Ratio  0.72    











Medications


Medications





 Current Medications


Hydralazine HCl (Apresoline) 10 mg Q6H  PRN IV SBP>170;  Start 11/17/17 at 11:30


Morphine Sulfate (morphine) 1 mg Q4H  PRN IV PAIN Last administered on 11/24/ 17at 03:56; Admin Dose 1 MG;  Start 11/17/17 at 12:30


Ondansetron HCl (Zofran Inj) 4 mg Q4H  PRN IV NAUSEA AND/OR VOMITING Last 

administered on 11/24/17at 22:38; Admin Dose 4 MG;  Start 11/17/17 at 13:00


Acetaminophen (Tylenol Tab) 650 mg Q4H  PRN PO PAIN AND OR ELEVATED TEMP Last 

administered on 11/25/17at 03:20; Admin Dose 650 MG;  Start 11/17/17 at 13:00


Pantoprazole (Protonix Tab) 40 mg DAILY@06 PO  Last administered on 11/25/17at 

05:16; Admin Dose 40 MG;  Start 11/18/17 at 06:00


Al Hydrox/Mg Hydrox/Simethicone (Mag-Al Plus) 30 ml Q6H  PRN PO 

GASTROINTESTINAL UPSET;  Start 11/17/17 at 20:00


Folic Acid (Folic Acid) 1 mg DAILY PO  Last administered on 11/23/17at 08:54; 

Admin Dose 1 MG;  Start 11/18/17 at 10:00


Levothyroxine Sodium (Synthroid) 50 mcg DAILY@06 PO  Last administered on 11/25/ 17at 06:50; Admin Dose 50 MCG;  Start 11/19/17 at 06:00


Valsartan (Diovan) 160 mg DAILY PO  Last administered on 11/24/17at 09:46; 

Admin Dose 160 MG;  Start 11/18/17 at 10:00


EZETIMIBE (Zetia) 10 mg DAILY PO  Last administered on 11/23/17at 08:53; Admin 

Dose 10 MG;  Start 11/18/17 at 10:00


Ibuprofen 800 mg 800 mg Q6H  PRN PO PAIN;  Start 11/18/17 at 10:00


Sodium Chloride (NS) 1,000 ml @  70 mls/hr [id number] IV  Last administered on 11/ 25/17at 06:50; Admin Dose 70 MLS/HR;  Start 11/18/17 at 12:30


Tramadol HCl (Ultram) 50 mg Q6H  PRN PO PAIN LEVEL 7-10 Last administered on 11/ 19/17at 13:46; Admin Dose 50 MG;  Start 11/18/17 at 12:30;  Status Future Hold


Cholecalciferol (Vitamin D) 4,000 unit DAILY PO  Last administered on 11/24/ 17at 08:30; Admin Dose 4,000 UNIT;  Start 11/19/17 at 09:00


Cholecalciferol 1000 unit 1,000 unit DAILY PO  Last administered on 11/24/17at 

08:31; Admin Dose 1,000 UNIT;  Start 11/19/17 at 09:00


Linezolid 300 ml @  200 mls/hr Q12H IVPB  Last administered on 11/25/17at 05:16

; Admin Dose 200 MLS/HR;  Start 11/19/17 at 17:00


Metronidazole (Flagyl 500 Mg (Pmx)) 100 ml @  100 mls/hr Q8 IVPB  Last 

administered on 11/25/17at 06:49; Admin Dose 100 MLS/HR;  Start 11/20/17 at 22:

00


Senna/Docusate Sodium (Senokot-S) 1 tab BID PO  Last administered on 11/23/17at 

20:40; Admin Dose 1 TAB;  Start 11/20/17 at 21:00


Zolpidem Tartrate (Ambien) 5 mg HS  PRN PO INSOMNIA;  Start 11/24/17 at 19:00











FRANCES FARLEY MD Nov 25, 2017 08:10

## 2017-11-25 NOTE — CONS
Date/Time of Note


Date/Time of Note


DATE: 11/25/17 


TIME: 11:20





Assessment/Plan


Assessment/Plan


Additional Assessment/Plan


- Gall bladder fossa abscess with VRE and Bacteroides; s/p CT guided drainage 11 /17/2017


- Biliary leak, s/p ERCP and stent placement into the right hepatic duct and 

left hepatic duct 11/20/2017


- S/p  laparoscopic cholecystectomy 10/25/17.


- HTN


- Hypothyroid


- PCN allergy; tolerated meropenem


- Hypokalemia- replace per primary





Recommendations:


- Continue Linezolid (11/19/2017-) and Metronidazole (11/20/2017-) x 14 days at 

least


- F/u MRCP results


   - 


- Down Trend WBC and check procalcitonin


Management d/w RN Olivia and Dr. Pham








Consultation Date/Type/Reason


Admit Date/Time


Nov 16, 2017 at 14:00


Initial Consult Date


11/20/17


Type of Consultation:  Infectious Disease


Referring Provider:  VIOLETTE GALDAMEZ MD





24 HR Interval Summary


Free Text/Dictation


-SP  MRCP  abdomen d/t elevated WBC and remains afebrile per d/w nursing.


-WBC trended down


- C/O abd pain while drain is flushed





Exam/Review of Systems


Vital Signs


Vitals





 Vital Signs








  Date Time  Temp Pulse Resp B/P Pulse Ox O2 Delivery O2 Flow Rate FiO2


 


11/25/17 09:47 98.4 74 17 152/65 96   














 Intake and Output   


 


 11/24/17 11/24/17 11/25/17





 15:00 23:00 07:00


 


Intake Total 420 ml 920 ml 1480 ml


 


Output Total  5 ml 905 ml


 


Balance 420 ml 915 ml 575 ml











Exam


Constitutional:  alert


Respiratory:  diminished breath sounds


Gastrointestinal:  soft, tender (RU abdomed d/t surgical drain placement)


Musculoskeletal:  nl extremities to inspection


Extremities:  normal pulses


Neurological:  nl speech





Results


Result Diagram:  


11/25/17 0510                                                                  

              11/25/17 0510





Results 24 hrs





Laboratory Tests








Test


  11/24/17


12:55 11/24/17


17:00 11/24/17


20:36 11/25/17


05:10


 


Urine Color YELLOW     


 


Urine Clarity CLEAR     


 


Urine pH 7.0     


 


Urine Specific Gravity 1.009     


 


Urine Ketones 1+  H   


 


Urine Nitrite NEGATIVE     


 


Urine Bilirubin NEGATIVE     


 


Urine Urobilinogen NEGATIVE     


 


Urine Leukocyte Esterase NEGATIVE     


 


Urine Microscopic RBC 3     


 


Urine Microscopic WBC 1     


 


Urine Hemoglobin 1+  H   


 


Urine Glucose 1+  H   


 


Urine Total Protein NEGATIVE     


 


Bedside Glucose  123   133   


 


White Blood Count    13.1  H


 


Red Blood Count    3.13  L


 


Hemoglobin    8.2  L


 


Hematocrit    25.8  L


 


Mean Corpuscular Volume    82.4  


 


Mean Corpuscular Hemoglobin    26.2  L


 


Mean Corpuscular Hemoglobin


Concent 


  


  


  31.8  L


 


 


Red Cell Distribution Width    17.0  H


 


Platelet Count    384  


 


Mean Platelet Volume    8.9  


 


Neutrophils %    80.9  H


 


Lymphocytes %    9.6  L


 


Monocytes %    6.4  


 


Eosinophils %    2.4  


 


Basophils %    0.5  


 


Nucleated Red Blood Cells %    0.0  


 


Neutrophils #    10.6  H


 


Lymphocytes #    1.3  


 


Monocytes #    0.8  


 


Eosinophils #    0.3  


 


Basophils #    0.1  


 


Nucleated Red Blood Cells #    0.0  


 


Sodium Level    139  


 


Potassium Level    3.4  L


 


Chloride Level    108  


 


Carbon Dioxide Level    24  


 


Anion Gap    10  


 


Blood Urea Nitrogen    4  L


 


Creatinine    0.50  


 


Glucose Level    108  #


 


Calcium Level    7.7  L


 


Total Bilirubin    0.4  


 


Direct Bilirubin    0.00  


 


Indirect Bilirubin    0.4  


 


Aspartate Amino Transf


(AST/SGOT) 


  


  


  14  L


 


 


Alanine Aminotransferase


(ALT/SGPT) 


  


  


  34  


 


 


Alkaline Phosphatase    132  H


 


Total Protein    5.7  L


 


Albumin    2.4  L


 


Globulin    3.30  H


 


Albumin/Globulin Ratio    0.72  














Test


  11/25/17


08:45 11/25/17


11:11 


  


 


 


Bedside Glucose 113   111    











Medications


Medications





 Current Medications


Hydralazine HCl (Apresoline) 10 mg Q6H  PRN IV SBP>170;  Start 11/17/17 at 11:30


Morphine Sulfate (morphine) 1 mg Q4H  PRN IV PAIN Last administered on 11/24/ 17at 03:56; Admin Dose 1 MG;  Start 11/17/17 at 12:30


Ondansetron HCl (Zofran Inj) 4 mg Q4H  PRN IV NAUSEA AND/OR VOMITING Last 

administered on 11/25/17at 08:50; Admin Dose 4 MG;  Start 11/17/17 at 13:00


Acetaminophen (Tylenol Tab) 650 mg Q4H  PRN PO PAIN AND OR ELEVATED TEMP Last 

administered on 11/25/17at 03:20; Admin Dose 650 MG;  Start 11/17/17 at 13:00


Pantoprazole (Protonix Tab) 40 mg DAILY@06 PO  Last administered on 11/25/17at 

05:16; Admin Dose 40 MG;  Start 11/18/17 at 06:00


Al Hydrox/Mg Hydrox/Simethicone (Mag-Al Plus) 30 ml Q6H  PRN PO 

GASTROINTESTINAL UPSET;  Start 11/17/17 at 20:00


Folic Acid (Folic Acid) 1 mg DAILY PO  Last administered on 11/23/17at 08:54; 

Admin Dose 1 MG;  Start 11/18/17 at 10:00


Levothyroxine Sodium (Synthroid) 50 mcg DAILY@06 PO  Last administered on 11/25/ 17at 06:50; Admin Dose 50 MCG;  Start 11/19/17 at 06:00


Valsartan (Diovan) 160 mg DAILY PO  Last administered on 11/25/17at 08:51; 

Admin Dose 160 MG;  Start 11/18/17 at 10:00


EZETIMIBE (Zetia) 10 mg DAILY PO  Last administered on 11/23/17at 08:53; Admin 

Dose 10 MG;  Start 11/18/17 at 10:00


Ibuprofen 800 mg 800 mg Q6H  PRN PO PAIN;  Start 11/18/17 at 10:00


Sodium Chloride (NS) 1,000 ml @  70 mls/hr [id number] IV  Last administered on 11/ 25/17at 06:50; Admin Dose 70 MLS/HR;  Start 11/18/17 at 12:30


Tramadol HCl (Ultram) 50 mg Q6H  PRN PO PAIN LEVEL 7-10 Last administered on 11/ 19/17at 13:46; Admin Dose 50 MG;  Start 11/18/17 at 12:30;  Status Future Hold


Cholecalciferol (Vitamin D) 4,000 unit DAILY PO  Last administered on 11/24/ 17at 08:30; Admin Dose 4,000 UNIT;  Start 11/19/17 at 09:00


Cholecalciferol 1000 unit 1,000 unit DAILY PO  Last administered on 11/24/17at 

08:31; Admin Dose 1,000 UNIT;  Start 11/19/17 at 09:00


Linezolid 300 ml @  200 mls/hr Q12H IVPB  Last administered on 11/25/17at 05:16

; Admin Dose 200 MLS/HR;  Start 11/19/17 at 17:00


Metronidazole (Flagyl 500 Mg (Pmx)) 100 ml @  100 mls/hr Q8 IVPB  Last 

administered on 11/25/17at 06:49; Admin Dose 100 MLS/HR;  Start 11/20/17 at 22:

00


Senna/Docusate Sodium (Senokot-S) 1 tab BID PO  Last administered on 11/23/17at 

20:40; Admin Dose 1 TAB;  Start 11/20/17 at 21:00


Zolpidem Tartrate (Ambien) 5 mg HS  PRN PO INSOMNIA;  Start 11/24/17 at 19:00











CRISPIN RAMIREZ Nov 25, 2017 11:26

## 2017-11-25 NOTE — PN
Date/Time of Note


Date/Time of Note


DATE: 11/24/17 


TIME: 18:07





Assessment/Plan


Lines/Catheters


IV Catheter Type (from [facility]):  Peripheral IV


Riojas in Place (from [facility]):  No





Assessment/Plan


Chief Complaint/Hosp Course


1.  Gallbladder fossa abscess/bile leak.  +HIDA s/p ERCP with 2 stents.  

Leukocytosis ? etiology


-pan cx


-mrcp


-continue drain


-abx


-pain management


2. Leukocytosis: Recurred


-as above


3. Elevated alk phos: likely 2/2 #1.  Slowly improving.


-as above


4. Diverticulosis


-diet/lifestyle optimization


3. Hiatal hernia: asymptomatic


-monitor


4. Atherosclerosis


-diet/exercise optimization





Thank you,





Late entry 11/24


Problems:  





Subjective


24 Hr Interval Summary


Leukocytosis today.  Epigastric pain since last night.  Min output from drain.  

Min nausea. No vomiting, fevers, chills, sob, congested cough, cp, palpitations

, ha, dizziness, diarrhea, dysuria.





Exam/Review of Systems


Vital Signs


Vitals





 Vital Signs








  Date Time  Temp Pulse Resp B/P Pulse Ox O2 Delivery O2 Flow Rate FiO2


 


11/25/17 16:25 98.7 77 16 187/74 99   














 Intake and Output   


 


 11/24/17 11/24/17 11/25/17





 14:59 22:59 06:59


 


Intake Total 520 ml 920 ml 1480 ml


 


Output Total  5 ml 905 ml


 


Balance 520 ml 915 ml 575 ml











Exam


Free Text/Dictation


Constitutional:  alert, oriented


Psych:  sad


Head:  atraumatic, normocephalic


Eyes:  nl lids, nl sclera


ENMT:  mucosa pink and moist, nl nasal mucosa & septum


Neck:  non-tender, supple


Respiratory:  clear to auscultation, normal air movement


Cardiovascular:  nl pulses, regular rate and rhythm


Gastrointestinal:  min-tender at drain site, soft, surgical scars (dry without 

drainage/bruising/discoloration), drain with dark green dc


Musculoskeletal:  nl extremities to inspection, nl gait and stance


Extremities:  normal pulses


Neurological:  nl mental status, nl speech, nl strength


Skin:  nl turgor, rash or lesions


Lymph:  nl lymph nodes





Results


Result Diagram:  


11/25/17 0510                                                                  

              11/25/17 0510














LIANET GROVER MD Nov 25, 2017 18:09

## 2017-11-26 VITALS — DIASTOLIC BLOOD PRESSURE: 70 MMHG | SYSTOLIC BLOOD PRESSURE: 163 MMHG | RESPIRATION RATE: 18 BRPM

## 2017-11-26 VITALS — SYSTOLIC BLOOD PRESSURE: 143 MMHG | DIASTOLIC BLOOD PRESSURE: 73 MMHG | HEART RATE: 80 BPM

## 2017-11-26 VITALS — SYSTOLIC BLOOD PRESSURE: 146 MMHG | RESPIRATION RATE: 20 BRPM | DIASTOLIC BLOOD PRESSURE: 64 MMHG

## 2017-11-26 LAB
ANION GAP SERPL CALC-SCNC: 15 MMOL/L (ref 8–16)
BUN SERPL-MCNC: 3 MG/DL (ref 7–20)
CALCIUM SERPL-MCNC: 7.7 MG/DL (ref 8.4–10.2)
CHLORIDE SERPL-SCNC: 105 MMOL/L (ref 97–110)
CO2 SERPL-SCNC: 22 MMOL/L (ref 21–31)
CREAT SERPL-MCNC: 0.45 MG/DL (ref 0.44–1)
GLUCOSE SERPL-MCNC: 123 MG/DL (ref 70–220)
POTASSIUM SERPL-SCNC: 2.7 MMOL/L (ref 3.5–5.1)
SODIUM SERPL-SCNC: 139 MMOL/L (ref 135–144)

## 2017-11-26 RX ADMIN — Medication SCH MLS/HR: at 06:28

## 2017-11-26 RX ADMIN — Medication SCH MLS/HR: at 14:11

## 2017-11-26 RX ADMIN — POTASSIUM CHLORIDE SCH MEQ: 20 TABLET, EXTENDED RELEASE ORAL at 16:28

## 2017-11-26 RX ADMIN — LIDOCAINE HYDROCHLORIDE SCH MLS/HR: 10 INJECTION, SOLUTION EPIDURAL; INFILTRATION; INTRACAUDAL; PERINEURAL at 03:57

## 2017-11-26 RX ADMIN — LINEZOLID SCH MLS/HR: 600 INJECTION, SOLUTION INTRAVENOUS at 16:37

## 2017-11-26 RX ADMIN — DEXAMETHASONE SODIUM PHOSPHATE PRN MG: 10 INJECTION, SOLUTION INTRAMUSCULAR; INTRAVENOUS at 08:02

## 2017-11-26 RX ADMIN — EZETIMIBE SCH MG: 10 TABLET ORAL at 08:15

## 2017-11-26 RX ADMIN — VITAMIN D, TAB 1000IU (100/BT) SCH UNIT: 25 TAB at 08:07

## 2017-11-26 RX ADMIN — POTASSIUM CHLORIDE SCH MEQ: 1.5 POWDER, FOR SOLUTION ORAL at 17:44

## 2017-11-26 RX ADMIN — LINEZOLID SCH MLS/HR: 600 INJECTION, SOLUTION INTRAVENOUS at 04:57

## 2017-11-26 RX ADMIN — POTASSIUM CHLORIDE SCH MEQ: 1.5 POWDER, FOR SOLUTION ORAL at 20:40

## 2017-11-26 RX ADMIN — FOLIC ACID SCH MLS/HR: 5 INJECTION, SOLUTION INTRAMUSCULAR; INTRAVENOUS; SUBCUTANEOUS at 20:42

## 2017-11-26 RX ADMIN — FOLIC ACID SCH MG: 1 TABLET ORAL at 08:08

## 2017-11-26 RX ADMIN — LIDOCAINE HYDROCHLORIDE SCH MLS/HR: 10 INJECTION, SOLUTION EPIDURAL; INFILTRATION; INTRACAUDAL; PERINEURAL at 16:41

## 2017-11-26 RX ADMIN — DOCUSATE SODIUM AND SENNOSIDES SCH TAB: 8.6; 5 TABLET, FILM COATED ORAL at 08:07

## 2017-11-26 RX ADMIN — PANTOPRAZOLE SODIUM SCH MG: 40 TABLET, DELAYED RELEASE ORAL at 06:05

## 2017-11-26 RX ADMIN — LEVOTHYROXINE SODIUM SCH MCG: 50 TABLET ORAL at 06:05

## 2017-11-26 RX ADMIN — DEXAMETHASONE SODIUM PHOSPHATE PRN MG: 10 INJECTION, SOLUTION INTRAMUSCULAR; INTRAVENOUS at 17:47

## 2017-11-26 RX ADMIN — VALSARTAN SCH MG: 160 TABLET, FILM COATED ORAL at 08:14

## 2017-11-26 RX ADMIN — LIDOCAINE HYDROCHLORIDE SCH MLS/HR: 10 INJECTION, SOLUTION EPIDURAL; INFILTRATION; INTRACAUDAL; PERINEURAL at 11:09

## 2017-11-26 RX ADMIN — DEXAMETHASONE SODIUM PHOSPHATE PRN MG: 10 INJECTION, SOLUTION INTRAMUSCULAR; INTRAVENOUS at 21:06

## 2017-11-26 RX ADMIN — DOCUSATE SODIUM AND SENNOSIDES SCH TAB: 8.6; 5 TABLET, FILM COATED ORAL at 20:43

## 2017-11-26 RX ADMIN — Medication SCH MLS/HR: at 21:07

## 2017-11-26 RX ADMIN — POTASSIUM CHLORIDE SCH MEQ: 20 TABLET, EXTENDED RELEASE ORAL at 15:30

## 2017-11-26 RX ADMIN — FOLIC ACID SCH MLS/HR: 5 INJECTION, SOLUTION INTRAMUSCULAR; INTRAVENOUS; SUBCUTANEOUS at 06:24

## 2017-11-26 RX ADMIN — ACARBOSE SCH MG: 50 TABLET ORAL at 16:37

## 2017-11-26 RX ADMIN — ACARBOSE SCH MG: 50 TABLET ORAL at 08:12

## 2017-11-26 NOTE — PN
Date/Time of Note


Date/Time of Note


DATE: 11/26/17 


TIME: 12:07





Assessment/Plan


VTE Prophylaxis


VTE Prophylaxis Intervention:  other





Lines/Catheters


IV Catheter Type (from [facility]):  Peripheral IV


Urinary Cath still in place:  No





Assessment/Plan


Chief Complaint/Hosp Course


-Gallbladder fossa abscess/bile leak. S/p drainage by radiology.  Positive HIDA 

scan. S/p ERCP and stent placement into the right hepatic duct and left hepatic 

duct on 11/20 by Dr Knpap.  Abdominal fluid culture positive for VRE, continue 

Zyvox.  Dr. Pham is following in infection disease consultation


-S/p  laparoscopic cholecystectomy 11/26/17.  Dr. Edwards is following in 

surgical consultation.


-Status post ERCP with stent placement status post subsequent stent removal


-Hypertension


-Hypothyroidism


-RA


Problems:  





Subjective


24 Hr Interval Summary


Free Text/Dictation


Patient has no complaints





Exam/Review of Systems


Vital Signs


Vitals





 Vital Signs








  Date Time  Temp Pulse Resp B/P Pulse Ox O2 Delivery O2 Flow Rate FiO2


 


11/26/17 02:00 98.7 82 20 146/64 100   














 Intake and Output   


 


 11/25/17 11/25/17 11/26/17





 15:00 23:00 07:00


 


Intake Total 200 ml 1790 ml 1180 ml


 


Output Total  5 ml 5 ml


 


Balance 200 ml 1785 ml 1175 ml











Exam


Constitutional:  well developed


Head:  atraumatic, normocephalic


Neck:  supple


Respiratory:  clear to auscultation


Cardiovascular:  regular rate and rhythm


Gastrointestinal:  non-tender, soft


Extremities:  normal pulses





Results


Result Diagram:  


11/25/17 0510                                                                  

              11/25/17 0510





Results 24 hrs





Laboratory Tests








Test


  11/25/17


17:13 11/25/17


21:22 11/26/17


08:05


 


Bedside Glucose 119   162   113  











Medications


Medications





 Current Medications


Hydralazine HCl (Apresoline) 10 mg Q6H  PRN IV SBP>170 Last administered on 11/ 25/17at 15:11; Admin Dose 10 MG;  Start 11/17/17 at 11:30


Morphine Sulfate (morphine) 1 mg Q4H  PRN IV PAIN Last administered on 11/24/ 17at 03:56; Admin Dose 1 MG;  Start 11/17/17 at 12:30


Ondansetron HCl (Zofran Inj) 4 mg Q4H  PRN IV NAUSEA AND/OR VOMITING Last 

administered on 11/26/17at 08:02; Admin Dose 4 MG;  Start 11/17/17 at 13:00


Acetaminophen (Tylenol Tab) 650 mg Q4H  PRN PO PAIN AND OR ELEVATED TEMP Last 

administered on 11/26/17at 01:28; Admin Dose 650 MG;  Start 11/17/17 at 13:00


Pantoprazole (Protonix Tab) 40 mg DAILY@06 PO  Last administered on 11/26/17at 

06:05; Admin Dose 40 MG;  Start 11/18/17 at 06:00


Al Hydrox/Mg Hydrox/Simethicone (Mag-Al Plus) 30 ml Q6H  PRN PO 

GASTROINTESTINAL UPSET;  Start 11/17/17 at 20:00


Folic Acid (Folic Acid) 1 mg DAILY PO  Last administered on 11/26/17at 08:08; 

Admin Dose 1 MG;  Start 11/18/17 at 10:00


Levothyroxine Sodium (Synthroid) 50 mcg DAILY@06 PO  Last administered on 11/26/ 17at 06:05; Admin Dose 50 MCG;  Start 11/19/17 at 06:00


Valsartan (Diovan) 160 mg DAILY PO  Last administered on 11/26/17at 08:14; 

Admin Dose 160 MG;  Start 11/18/17 at 10:00


EZETIMIBE (Zetia) 10 mg DAILY PO  Last administered on 11/26/17at 08:15; Admin 

Dose 10 MG;  Start 11/18/17 at 10:00


Ibuprofen 800 mg 800 mg Q6H  PRN PO PAIN;  Start 11/18/17 at 10:00


Sodium Chloride (NS) 1,000 ml @  70 mls/hr [id number] IV  Last administered on 11/ 25/17at 06:50; Admin Dose 70 MLS/HR;  Start 11/18/17 at 12:30


Tramadol HCl (Ultram) 50 mg Q6H  PRN PO PAIN LEVEL 7-10 Last administered on 11/ 19/17at 13:46; Admin Dose 50 MG;  Start 11/18/17 at 12:30;  Status Future Hold


Cholecalciferol (Vitamin D) 4,000 unit DAILY PO  Last administered on 11/26/ 17at 08:07; Admin Dose 4,000 UNIT;  Start 11/19/17 at 09:00


Cholecalciferol 1000 unit 1,000 unit DAILY PO  Last administered on 11/26/17at 

08:07; Admin Dose 1,000 UNIT;  Start 11/19/17 at 09:00


Linezolid 300 ml @  200 mls/hr Q12H IVPB  Last administered on 11/26/17at 04:57

; Admin Dose 200 MLS/HR;  Start 11/19/17 at 17:00


Metronidazole (Flagyl 500 Mg (Pmx)) 100 ml @  100 mls/hr Q8 IVPB  Last 

administered on 11/26/17at 06:28; Admin Dose 100 MLS/HR;  Start 11/20/17 at 22:

00


Senna/Docusate Sodium (Senokot-S) 1 tab BID PO  Last administered on 11/26/17at 

08:07; Admin Dose 1 TAB;  Start 11/20/17 at 21:00


Zolpidem Tartrate 5 mg 5 mg HS  PRN PO INSOMNIA;  Start 11/24/17 at 19:00


Sodium Chloride (NS) 500 ml @  70 mls/hr Q7H9M IV  Last administered on 11/26/ 17at 03:57; Admin Dose 70 MLS/HR;  Start 11/26/17 at 04:00











LURDES PRICE Nov 26, 2017 12:08

## 2017-11-26 NOTE — CONS
Date/Time of Note


Date/Time of Note


DATE: 11/26/17 


TIME: 11:34





Assessment/Plan


Assessment/Plan


Additional Assessment/Plan


- Gall bladder fossa abscess with VRE and Bacteroides; s/p CT guided drainage 11 /17/2017


- Biliary leak, s/p ERCP and stent placement into the right hepatic duct and 

left hepatic duct 11/20/2017


- S/p  laparoscopic cholecystectomy 10/25/17.


- HTN


- Hypothyroid


- PCN allergy; tolerated meropenem


- Hypokalemia- replace per primary





Recommendations:


- Continue Linezolid (11/19/2017-) and Metronidazole (11/20/2017-) x 14 days at 

least


- F/u MRCP results


   - 


- Down Trend WBC and check procalcitonin


Management d/w JACE Baker and Dr. Pham








Consultation Date/Type/Reason


Admit Date/Time


Nov 16, 2017 at 14:00


Initial Consult Date


11/20/17


Type of Consultation:  Infectious Disease


Referring Provider:  VIOLETTE GALDAMEZ MD





24 HR Interval Summary


Free Text/Dictation


sitting up in chair, afebrile, Wbc trended down. RUQ drain- feww cc clear 

drainage, dw staff


Constitutional:  improved





Detailed Summary


Respiratory:  no complaints


Cardiovascular:  no complaints


Gastrointestinal:  pain


Genitourinary:  no complaints


Musculoskeletal:  no complaints


Neurologic:  no complaints





Exam/Review of Systems


Vital Signs


Vitals





 Vital Signs








  Date Time  Temp Pulse Resp B/P Pulse Ox O2 Delivery O2 Flow Rate FiO2


 


11/26/17 02:00 98.7 82 20 146/64 100   














 Intake and Output   


 


 11/25/17 11/25/17 11/26/17





 15:00 23:00 07:00


 


Intake Total 200 ml 1790 ml 1180 ml


 


Output Total  5 ml 5 ml


 


Balance 200 ml 1785 ml 1175 ml











Exam


Constitutional:  alert, well developed


Psych:  nl mood/affect


Respiratory:  diminished breath sounds, normal air movement


Cardiovascular:  nl pulses


Gastrointestinal:  other, soft, tender


Musculoskeletal:  nl extremities to inspection


Neurological:  nl mental status





Results


Result Diagram:  


11/25/17 0510                                                                  

              11/25/17 0510





Results 24 hrs





Laboratory Tests








Test


  11/25/17


17:13 11/25/17


21:22 11/26/17


08:05


 


Bedside Glucose 119   162   113  











Medications


Medications





 Current Medications


Hydralazine HCl (Apresoline) 10 mg Q6H  PRN IV SBP>170 Last administered on 11/ 25/17at 15:11; Admin Dose 10 MG;  Start 11/17/17 at 11:30


Morphine Sulfate (morphine) 1 mg Q4H  PRN IV PAIN Last administered on 11/24/ 17at 03:56; Admin Dose 1 MG;  Start 11/17/17 at 12:30


Ondansetron HCl (Zofran Inj) 4 mg Q4H  PRN IV NAUSEA AND/OR VOMITING Last 

administered on 11/26/17at 08:02; Admin Dose 4 MG;  Start 11/17/17 at 13:00


Acetaminophen (Tylenol Tab) 650 mg Q4H  PRN PO PAIN AND OR ELEVATED TEMP Last 

administered on 11/26/17at 01:28; Admin Dose 650 MG;  Start 11/17/17 at 13:00


Pantoprazole (Protonix Tab) 40 mg DAILY@06 PO  Last administered on 11/26/17at 

06:05; Admin Dose 40 MG;  Start 11/18/17 at 06:00


Al Hydrox/Mg Hydrox/Simethicone (Mag-Al Plus) 30 ml Q6H  PRN PO 

GASTROINTESTINAL UPSET;  Start 11/17/17 at 20:00


Folic Acid (Folic Acid) 1 mg DAILY PO  Last administered on 11/26/17at 08:08; 

Admin Dose 1 MG;  Start 11/18/17 at 10:00


Levothyroxine Sodium (Synthroid) 50 mcg DAILY@06 PO  Last administered on 11/26/ 17at 06:05; Admin Dose 50 MCG;  Start 11/19/17 at 06:00


Valsartan (Diovan) 160 mg DAILY PO  Last administered on 11/26/17at 08:14; 

Admin Dose 160 MG;  Start 11/18/17 at 10:00


EZETIMIBE (Zetia) 10 mg DAILY PO  Last administered on 11/26/17at 08:15; Admin 

Dose 10 MG;  Start 11/18/17 at 10:00


Ibuprofen 800 mg 800 mg Q6H  PRN PO PAIN;  Start 11/18/17 at 10:00


Sodium Chloride (NS) 1,000 ml @  70 mls/hr [id number] IV  Last administered on 11/ 25/17at 06:50; Admin Dose 70 MLS/HR;  Start 11/18/17 at 12:30


Tramadol HCl (Ultram) 50 mg Q6H  PRN PO PAIN LEVEL 7-10 Last administered on 11/ 19/17at 13:46; Admin Dose 50 MG;  Start 11/18/17 at 12:30;  Status Future Hold


Cholecalciferol (Vitamin D) 4,000 unit DAILY PO  Last administered on 11/26/ 17at 08:07; Admin Dose 4,000 UNIT;  Start 11/19/17 at 09:00


Cholecalciferol 1000 unit 1,000 unit DAILY PO  Last administered on 11/26/17at 

08:07; Admin Dose 1,000 UNIT;  Start 11/19/17 at 09:00


Linezolid 300 ml @  200 mls/hr Q12H IVPB  Last administered on 11/26/17at 04:57

; Admin Dose 200 MLS/HR;  Start 11/19/17 at 17:00


Metronidazole (Flagyl 500 Mg (Pmx)) 100 ml @  100 mls/hr Q8 IVPB  Last 

administered on 11/26/17at 06:28; Admin Dose 100 MLS/HR;  Start 11/20/17 at 22:

00


Senna/Docusate Sodium (Senokot-S) 1 tab BID PO  Last administered on 11/26/17at 

08:07; Admin Dose 1 TAB;  Start 11/20/17 at 21:00


Zolpidem Tartrate 5 mg 5 mg HS  PRN PO INSOMNIA;  Start 11/24/17 at 19:00


Sodium Chloride (NS) 500 ml @  70 mls/hr Q7H9M IV  Last administered on 11/26/ 17at 03:57; Admin Dose 70 MLS/HR;  Start 11/26/17 at 04:00











CRISPIN RAMIREZ Nov 26, 2017 11:36

## 2017-11-26 NOTE — CONS
DATE OF ADMISSION: 11/16/2017

DATE OF CONSULTATION:  11/26/2017

 

 

 

CHIEF COMPLAINT:  The patient at this time has no complaints.  Patient has history of laparoscopic c
holecystectomy and she was found to have a biliary leak by means of ERCP.  Two stents were placed.  
Now, the drainage seems to be almost nil yesterday she had 5 night's all night long and also today i
s not even 5 mL.

 

LABORATORY WORKUP:  Hemoglobin is 8.2.  WBC count 13,100.

 

CLINICAL IMPRESSION:  Status post placement of hepatic ducts bilaterally for the biliary leak.  At t
his time, the bleeding or leakage from the biliary drainage was put externally into the gallbladder 
fossa seems to be almost nothing.

 

PLAN:  Consider to remove the percutaneous drainage.

 

 

Dictated By: LILLIAM CRENSHAW MD

 

NC/NTS

DD:    11/26/2017 16:20:48

DT:    11/26/2017 19:12:06

Conf#: [id number]

DID#:  [id number]

CC: VIOLETTE GALDAMEZ MD; LIANET GROVER MD;*EndCC*

## 2017-11-26 NOTE — PN
Date/Time of Note


Date/Time of Note


DATE: 11/26/17 


TIME: 22:00





Assessment/Plan


Lines/Catheters


IV Catheter Type (from [facility]):  Peripheral IV


Riojas in Place (from [facility]):  No





Assessment/Plan


Chief Complaint/Hosp Course


1.  Gallbladder fossa abscess/bile leak.  +HIDA s/p ERCP with 2 stents.  

Leukocytosis ? etiology.  MRCP noted.


-drain


-abx


-pain management


-pulmonary toilette


-pulmonary consult


-amylase/lipase


2. Leukocytosis: Recurred but slowly improving.


-as above


3. Elevated alk phos: likely 2/2 #1.  Slowly improving.


-as above


4. Diverticulosis


-diet/lifestyle optimization


3. Hiatal hernia: asymptomatic


-monitor


4. Atherosclerosis


-diet/exercise optimization





Thank you,


Problems:  





Subjective


24 Hr Interval Summary


Epigastric pain resolved.  Min output from drain.  Min nausea. No vomiting, 

fevers, chills, sob, congested cough, cp, palpitations, ha, dizziness, diarrhea

, dysuria.





Exam/Review of Systems


Vital Signs


Vitals





 Vital Signs








  Date Time  Temp Pulse Resp B/P Pulse Ox O2 Delivery O2 Flow Rate FiO2


 


11/26/17 20:04 98.7 86 18 163/70 97   














 Intake and Output   


 


 11/25/17 11/25/17 11/26/17





 15:00 23:00 07:00


 


Intake Total 200 ml 1790 ml 1180 ml


 


Output Total  5 ml 5 ml


 


Balance 200 ml 1785 ml 1175 ml











Exam


Free Text/Dictation


Constitutional:  alert, oriented


Psych:  sad


Head:  atraumatic, normocephalic


Eyes:  nl lids, nl sclera


ENMT:  mucosa pink and moist, nl nasal mucosa & septum


Neck:  non-tender, supple


Respiratory:  clear to auscultation, normal air movement


Cardiovascular:  nl pulses, regular rate and rhythm


Gastrointestinal:  min-tender at drain site, soft, surgical scars (dry without 

drainage/bruising/discoloration), drain with dark green dc


Musculoskeletal:  nl extremities to inspection, nl gait and stance


Extremities:  normal pulses


Neurological:  nl mental status, nl speech, nl strength


Skin:  nl turgor, rash or lesions


Lymph:  nl lymph nodes





Results


Result Diagram:  


11/25/17 0510                                                                  

              11/26/17 1349














LIANET GROEVR MD Nov 26, 2017 22:02

## 2017-11-27 VITALS — RESPIRATION RATE: 18 BRPM | DIASTOLIC BLOOD PRESSURE: 72 MMHG | SYSTOLIC BLOOD PRESSURE: 166 MMHG

## 2017-11-27 VITALS — SYSTOLIC BLOOD PRESSURE: 156 MMHG | DIASTOLIC BLOOD PRESSURE: 68 MMHG | RESPIRATION RATE: 6 BRPM

## 2017-11-27 VITALS — SYSTOLIC BLOOD PRESSURE: 166 MMHG | DIASTOLIC BLOOD PRESSURE: 72 MMHG

## 2017-11-27 VITALS — DIASTOLIC BLOOD PRESSURE: 72 MMHG | HEART RATE: 87 BPM | RESPIRATION RATE: 18 BRPM | SYSTOLIC BLOOD PRESSURE: 181 MMHG

## 2017-11-27 VITALS — SYSTOLIC BLOOD PRESSURE: 159 MMHG | RESPIRATION RATE: 20 BRPM | DIASTOLIC BLOOD PRESSURE: 73 MMHG

## 2017-11-27 VITALS — SYSTOLIC BLOOD PRESSURE: 175 MMHG | RESPIRATION RATE: 20 BRPM | DIASTOLIC BLOOD PRESSURE: 77 MMHG

## 2017-11-27 LAB
ALBUMIN SERPL-MCNC: 2.7 G/DL (ref 3.3–4.9)
ALBUMIN/GLOB SERPL: 0.77 {RATIO}
ALP SERPL-CCNC: 139 IU/L (ref 42–121)
ALT SERPL-CCNC: 31 IU/L (ref 13–69)
AMYLASE SERPL-CCNC: 39 U/L (ref 11–123)
ANION GAP SERPL CALC-SCNC: 10 MMOL/L (ref 8–16)
AST SERPL-CCNC: 13 IU/L (ref 15–46)
BASOPHILS # BLD AUTO: 0.1 10^3/UL (ref 0–0.1)
BASOPHILS NFR BLD: 0.7 % (ref 0–2)
BILIRUB DIRECT SERPL-MCNC: 0 MG/DL (ref 0–0.2)
BILIRUB SERPL-MCNC: 0.4 MG/DL (ref 0.2–1.3)
BUN SERPL-MCNC: 2 MG/DL (ref 7–20)
CALCIUM SERPL-MCNC: 8 MG/DL (ref 8.4–10.2)
CHLORIDE SERPL-SCNC: 107 MMOL/L (ref 97–110)
CO2 SERPL-SCNC: 25 MMOL/L (ref 21–31)
CREAT SERPL-MCNC: 0.48 MG/DL (ref 0.44–1)
EOSINOPHIL # BLD: 0.6 10^3/UL (ref 0–0.5)
EOSINOPHIL NFR BLD: 4.7 % (ref 0–7)
ERYTHROCYTE [DISTWIDTH] IN BLOOD BY AUTOMATED COUNT: 17.2 % (ref 11.5–14.5)
GLOBULIN SER-MCNC: 3.5 G/DL (ref 1.3–3.2)
GLUCOSE SERPL-MCNC: 108 MG/DL (ref 70–220)
HCT VFR BLD CALC: 28 % (ref 37–47)
HGB BLD-MCNC: 9.2 G/DL (ref 12–16)
LYMPHOCYTES # BLD AUTO: 1.9 10^3/UL (ref 0.8–2.9)
LYMPHOCYTES NFR BLD AUTO: 15.5 % (ref 15–51)
MCH RBC QN AUTO: 27.1 PG (ref 29–33)
MCHC RBC AUTO-ENTMCNC: 32.9 G/DL (ref 32–37)
MCV RBC AUTO: 82.6 FL (ref 82–101)
MONOCYTES # BLD: 0.8 10^3/UL (ref 0.3–0.9)
MONOCYTES NFR BLD: 6.8 % (ref 0–11)
NEUTROPHILS # BLD: 8.6 10^3/UL (ref 1.6–7.5)
NEUTROPHILS NFR BLD AUTO: 71.8 % (ref 39–77)
NRBC # BLD MANUAL: 0 10^3/UL (ref 0–0)
NRBC BLD AUTO-RTO: 0 /100WBC (ref 0–0)
PLATELET # BLD: 420 10^3/UL (ref 140–415)
PMV BLD AUTO: 9 FL (ref 7.4–10.4)
POTASSIUM SERPL-SCNC: 3.3 MMOL/L (ref 3.5–5.1)
PROT SERPL-MCNC: 6.2 G/DL (ref 6.1–8.1)
RBC # BLD AUTO: 3.39 10^6/UL (ref 4.2–5.4)
SODIUM SERPL-SCNC: 139 MMOL/L (ref 135–144)
WBC # BLD AUTO: 12 10^3/UL (ref 4.8–10.8)

## 2017-11-27 RX ADMIN — LINEZOLID SCH MLS/HR: 600 INJECTION, SOLUTION INTRAVENOUS at 17:23

## 2017-11-27 RX ADMIN — ACARBOSE SCH MG: 50 TABLET ORAL at 17:18

## 2017-11-27 RX ADMIN — LIDOCAINE HYDROCHLORIDE SCH MLS/HR: 10 INJECTION, SOLUTION EPIDURAL; INFILTRATION; INTRACAUDAL; PERINEURAL at 01:27

## 2017-11-27 RX ADMIN — LIDOCAINE HYDROCHLORIDE SCH MLS/HR: 10 INJECTION, SOLUTION EPIDURAL; INFILTRATION; INTRACAUDAL; PERINEURAL at 08:36

## 2017-11-27 RX ADMIN — VALSARTAN SCH MG: 160 TABLET, FILM COATED ORAL at 08:41

## 2017-11-27 RX ADMIN — FOLIC ACID SCH MLS/HR: 5 INJECTION, SOLUTION INTRAMUSCULAR; INTRAVENOUS; SUBCUTANEOUS at 10:03

## 2017-11-27 RX ADMIN — LIDOCAINE HYDROCHLORIDE SCH MLS/HR: 10 INJECTION, SOLUTION EPIDURAL; INFILTRATION; INTRACAUDAL; PERINEURAL at 10:03

## 2017-11-27 RX ADMIN — Medication SCH MLS/HR: at 06:19

## 2017-11-27 RX ADMIN — VITAMIN D, TAB 1000IU (100/BT) SCH UNIT: 25 TAB at 08:41

## 2017-11-27 RX ADMIN — PANTOPRAZOLE SODIUM SCH MG: 40 TABLET, DELAYED RELEASE ORAL at 06:19

## 2017-11-27 RX ADMIN — LIDOCAINE HYDROCHLORIDE SCH MLS/HR: 10 INJECTION, SOLUTION EPIDURAL; INFILTRATION; INTRACAUDAL; PERINEURAL at 15:45

## 2017-11-27 RX ADMIN — DOCUSATE SODIUM AND SENNOSIDES SCH TAB: 8.6; 5 TABLET, FILM COATED ORAL at 21:00

## 2017-11-27 RX ADMIN — LIDOCAINE HYDROCHLORIDE SCH MLS/HR: 10 INJECTION, SOLUTION EPIDURAL; INFILTRATION; INTRACAUDAL; PERINEURAL at 22:54

## 2017-11-27 RX ADMIN — LEVOTHYROXINE SODIUM SCH MCG: 50 TABLET ORAL at 06:19

## 2017-11-27 RX ADMIN — Medication SCH MLS/HR: at 21:59

## 2017-11-27 RX ADMIN — ACARBOSE SCH MG: 50 TABLET ORAL at 08:40

## 2017-11-27 RX ADMIN — DEXAMETHASONE SODIUM PHOSPHATE PRN MG: 10 INJECTION, SOLUTION INTRAMUSCULAR; INTRAVENOUS at 06:18

## 2017-11-27 RX ADMIN — LINEZOLID SCH MLS/HR: 600 INJECTION, SOLUTION INTRAVENOUS at 04:49

## 2017-11-27 RX ADMIN — FOLIC ACID SCH MG: 1 TABLET ORAL at 08:41

## 2017-11-27 RX ADMIN — DOCUSATE SODIUM AND SENNOSIDES SCH TAB: 8.6; 5 TABLET, FILM COATED ORAL at 08:41

## 2017-11-27 RX ADMIN — EZETIMIBE SCH MG: 10 TABLET ORAL at 08:41

## 2017-11-27 RX ADMIN — Medication SCH MLS/HR: at 13:58

## 2017-11-27 NOTE — PN
Date/Time of Note


Date/Time of Note


DATE: 11/27/17 


TIME: 10:20





Assessment/Plan


Lines/Catheters


IV Catheter Type (from [facility]):  Peripheral IV


Riojas in Place (from [facility]):  No





Assessment/Plan


Chief Complaint/Hosp Course


1.  Gallbladder fossa abscess/bile leak.  +HIDA s/p ERCP with 2 stents. MRCP 

noted.


-cont drain care


-abx


-pain management


2. Leukocytosis: Recurred;  improving.


-as above


3. Elevated alk phos: likely 2/2 #1.  Slowly improving.


-as above


4. Diverticulosis


-diet/lifestyle optimization


3. Hiatal hernia: asymptomatic


-monitor


4. Atherosclerosis


-diet/exercise optimization


5. Pleural effusions: 


-pulmonary toilette


-pulmonary consult








Thank you. Patient seen and examined in collaboration with Dr. Theodore Edwards.


Problems:  





Subjective


24 Hr Interval Summary


Vomiting overnight- none today. Improved nausea. Drain with min output. 

Leukocytosis improved. No fevers, chills, sob, congested cough, cp, palpitations

, ha, dizziness, n/v/d/dysuria.





Exam/Review of Systems


Vital Signs


Vitals





 Vital Signs








  Date Time  Temp Pulse Resp B/P Pulse Ox O2 Delivery O2 Flow Rate FiO2


 


11/27/17 08:00 98.3 80 20 175/77 96   














 Intake and Output   


 


 11/26/17 11/26/17 11/27/17





 15:00 23:00 07:00


 


Intake Total 100 ml 1660 ml 1210 ml


 


Output Total   3 ml


 


Balance 100 ml 1660 ml 1207 ml











Exam


Free Text/Dictation


Constitutional:  alert, oriented


Psych:  sad


Head:  atraumatic, normocephalic


Eyes:  nl lids, nl sclera


ENMT:  mucosa pink and moist, nl nasal mucosa & septum


Neck:  non-tender, supple


Respiratory:  clear to auscultation, normal air movement


Cardiovascular:  nl pulses, regular rate and rhythm


Gastrointestinal:  min-tender at drain site, soft, surgical scars (dry without 

drainage/bruising/discoloration), drain with dark yellow drainage


Musculoskeletal:  nl extremities to inspection, nl gait and stance


Extremities:  normal pulses


Neurological:  nl mental status, nl speech, nl strength


Skin:  nl turgor, rash or lesions


Lymph:  nl lymph nodes





Results


Result Diagram:  


11/27/17 0514                                                                  

              11/27/17 0514














SHILPI IZAGUIRRE NP Nov 27, 2017 10:30

## 2017-11-27 NOTE — CONS
Date/Time of Note


Date/Time of Note


DATE: 11/27/17 


TIME: 10:31





Assessment/Plan


Assessment/Plan


Chief Complaint/Hosp Course


- Gall bladder fossa abscess with VRE and Bacteroides; s/p CT guided drainage 11 /17/2017


- Biliary leak, s/p ERCP and stent placement into the right hepatic duct and 

left hepatic duct 11/20/2017


- S/p  laparoscopic cholecystectomy 10/25/17.


- HTN


- Hypothyroid


- PCN allergy; tolerated meropenem





Recommendations:


- monitoring nausea- could be related to metronidazole--if persists then 

consideration of changing to Ertapenem could be entertained


- Continue Linezolid (11/19/2017-) and Metronidazole (11/20/2017-) x 14 days at 

least


- Repeat imaging prior to cessation of abx


Problems:  





Consultation Date/Type/Reason


Admit Date/Time


Nov 16, 2017 at 14:00


Initial Consult Date


11/20/17


Type of Consultation:  Infectious Disease


Referring Provider:  VIOLETTE GALDAMEZ MD





24 HR Interval Summary


Free Text/Dictation


she is complaining about some nausea this am. Care d/w NP for surgery at 

bedside.





Exam/Review of Systems


Vital Signs


Vitals





 Vital Signs








  Date Time  Temp Pulse Resp B/P Pulse Ox O2 Delivery O2 Flow Rate FiO2


 


11/27/17 08:00 98.3 80 20 175/77 96   














 Intake and Output   


 


 11/26/17 11/26/17 11/27/17





 15:00 23:00 07:00


 


Intake Total 100 ml 1660 ml 1210 ml


 


Output Total   3 ml


 


Balance 100 ml 1660 ml 1207 ml











Exam


Constitutional:  alert, oriented, well developed


Psych:  nl mood/affect, no complaints


Eyes:  EOMI, PERRL, nl conjunctiva, nl lids, nl sclera


ENMT:  nl external ears & nose, nl lips & teeth, nl nasal mucosa & septum


Respiratory:  clear to auscultation, normal air movement


Cardiovascular:  nl pulses, regular rate and rhythm


Gastrointestinal:  nl liver, spleen, non-tender, soft





Results


Result Diagram:  


11/27/17 0514                                                                  

              11/27/17 0514





Results 24 hrs





Laboratory Tests








Test


  11/26/17


12:08 11/26/17


13:49 11/26/17


16:35 11/26/17


20:40


 


Bedside Glucose 100    124   131  


 


Sodium Level  139    


 


Potassium Level  2.7  *L  


 


Chloride Level  105    


 


Carbon Dioxide Level  22    


 


Anion Gap  15    


 


Blood Urea Nitrogen  3  L  


 


Creatinine  0.45    


 


Glucose Level  123    


 


Calcium Level  7.7  L  














Test


  11/27/17


05:14 11/27/17


08:10 


  


 


 


White Blood Count 12.0  H   


 


Red Blood Count 3.39  L   


 


Hemoglobin 9.2  L   


 


Hematocrit 28.0  L   


 


Mean Corpuscular Volume 82.6     


 


Mean Corpuscular Hemoglobin 27.1  L   


 


Mean Corpuscular Hemoglobin


Concent 32.9  


  


  


  


 


 


Red Cell Distribution Width 17.2  H   


 


Platelet Count 420  H   


 


Mean Platelet Volume 9.0     


 


Neutrophils % 71.8     


 


Lymphocytes % 15.5     


 


Monocytes % 6.8     


 


Eosinophils % 4.7     


 


Basophils % 0.7     


 


Nucleated Red Blood Cells % 0.0     


 


Neutrophils # 8.6  H   


 


Lymphocytes # 1.9     


 


Monocytes # 0.8     


 


Eosinophils # 0.6  H   


 


Basophils # 0.1     


 


Nucleated Red Blood Cells # 0.0     


 


Sodium Level 139     


 


Potassium Level 3.3  L   


 


Chloride Level 107     


 


Carbon Dioxide Level 25     


 


Anion Gap 10  #   


 


Blood Urea Nitrogen 2  L   


 


Creatinine 0.48     


 


Glucose Level 108     


 


Calcium Level 8.0  L   


 


Total Bilirubin 0.4     


 


Direct Bilirubin 0.00     


 


Indirect Bilirubin 0.4     


 


Aspartate Amino Transf


(AST/SGOT) 13  L


  


  


  


 


 


Alanine Aminotransferase


(ALT/SGPT) 31  


  


  


  


 


 


Alkaline Phosphatase 139  H   


 


Total Protein 6.2     


 


Albumin 2.7  L   


 


Globulin 3.50  H   


 


Albumin/Globulin Ratio 0.77     


 


Amylase Level 39     


 


Lipase 24     


 


Bedside Glucose  123    











Medications


Medications





 Current Medications


Hydralazine HCl (Apresoline) 10 mg Q6H  PRN IV SBP>170 Last administered on 11/ 25/17at 15:11; Admin Dose 10 MG;  Start 11/17/17 at 11:30


Morphine Sulfate (morphine) 1 mg Q4H  PRN IV PAIN Last administered on 11/24/ 17at 03:56; Admin Dose 1 MG;  Start 11/17/17 at 12:30


Ondansetron HCl (Zofran Inj) 4 mg Q4H  PRN IV NAUSEA AND/OR VOMITING Last 

administered on 11/27/17at 06:18; Admin Dose 4 MG;  Start 11/17/17 at 13:00


Acetaminophen (Tylenol Tab) 650 mg Q4H  PRN PO PAIN AND OR ELEVATED TEMP Last 

administered on 11/26/17at 01:28; Admin Dose 650 MG;  Start 11/17/17 at 13:00


Pantoprazole (Protonix Tab) 40 mg DAILY@06 PO  Last administered on 11/27/17at 

06:19; Admin Dose 40 MG;  Start 11/18/17 at 06:00


Al Hydrox/Mg Hydrox/Simethicone (Mag-Al Plus) 30 ml Q6H  PRN PO 

GASTROINTESTINAL UPSET;  Start 11/17/17 at 20:00


Folic Acid (Folic Acid) 1 mg DAILY PO  Last administered on 11/27/17at 08:41; 

Admin Dose 1 MG;  Start 11/18/17 at 10:00


Levothyroxine Sodium (Synthroid) 50 mcg DAILY@06 PO  Last administered on 11/27/ 17at 06:19; Admin Dose 50 MCG;  Start 11/19/17 at 06:00


Valsartan (Diovan) 160 mg DAILY PO  Last administered on 11/27/17at 08:41; 

Admin Dose 160 MG;  Start 11/18/17 at 10:00


EZETIMIBE (Zetia) 10 mg DAILY PO  Last administered on 11/27/17at 08:41; Admin 

Dose 10 MG;  Start 11/18/17 at 10:00


Ibuprofen 800 mg 800 mg Q6H  PRN PO PAIN;  Start 11/18/17 at 10:00


Sodium Chloride (NS) 1,000 ml @  70 mls/hr [id number] IV  Last administered on 11/ 27/17at 10:03; Admin Dose 70 MLS/HR;  Start 11/18/17 at 12:30


Tramadol HCl (Ultram) 50 mg Q6H  PRN PO PAIN LEVEL 7-10 Last administered on 11/ 19/17at 13:46; Admin Dose 50 MG;  Start 11/18/17 at 12:30;  Status Future Hold


Cholecalciferol (Vitamin D) 4,000 unit DAILY PO  Last administered on 11/27/ 17at 08:41; Admin Dose 4,000 UNIT;  Start 11/19/17 at 09:00


Cholecalciferol 1000 unit 1,000 unit DAILY PO  Last administered on 11/27/17at 

08:41; Admin Dose 1,000 UNIT;  Start 11/19/17 at 09:00


Linezolid 300 ml @  200 mls/hr Q12H IVPB  Last administered on 11/27/17at 04:49

; Admin Dose 200 MLS/HR;  Start 11/19/17 at 17:00


Metronidazole (Flagyl 500 Mg (Pmx)) 100 ml @  100 mls/hr Q8 IVPB  Last 

administered on 11/27/17at 06:19; Admin Dose 100 MLS/HR;  Start 11/20/17 at 22:

00


Senna/Docusate Sodium (Senokot-S) 1 tab BID PO  Last administered on 11/27/17at 

08:41; Admin Dose 1 TAB;  Start 11/20/17 at 21:00


Zolpidem Tartrate 5 mg 5 mg HS  PRN PO INSOMNIA;  Start 11/24/17 at 19:00


Sodium Chloride (NS) 500 ml @  70 mls/hr Q7H9M IV  Last administered on 11/27/ 17at 10:03; Admin Dose 70 MLS/HR;  Start 11/26/17 at 04:00











FRANCES FARLEY MD Nov 27, 2017 10:33

## 2017-11-27 NOTE — PN
Date/Time of Note


Date/Time of Note


DATE: 11/27/17 


TIME: 17:51





Assessment/Plan


VTE Prophylaxis


VTE Prophylaxis Intervention:  SCD's





Lines/Catheters


IV Catheter Type (from [facility]):  Peripheral IV


Urinary Cath still in place:  No





Assessment/Plan


Chief Complaint/Hosp Course


Patient's complains of nausea and poor appetite, minimal amount of drainage 

from that gallbladder fossa drain.


Assessment/Plan


-Gallbladder fossa abscess/bile leak. S/p drainage by radiology.  Positive HIDA 

scan. S/p ERCP and stent placement into the right hepatic duct and left hepatic 

duct on 11/20 by Dr Knapp.  Abdominal fluid culture positive for VRE, continue 

Zyvox.  Dr. Pham is following in infection disease consultation


-S/p  laparoscopic cholecystectomy 11/26/17.  Dr. Edwards is following in 

surgical consultation.


-Status post ERCP with stent placement status post subsequent stent removal


-Hypertension, continue Diovan and hydralazine.


-Hypothyroidism


-RA





Further recommendations based on clinical course.  Plan of care discussed with 

Dr. Brooks


Problems:  





Exam/Review of Systems


Vital Signs


Vitals





 Vital Signs








  Date Time  Temp Pulse Resp B/P Pulse Ox O2 Delivery O2 Flow Rate FiO2


 


11/27/17 14:11    166/72    


 


11/27/17 14:05 98.1 87 18  98 Room Air  














 Intake and Output   


 


 11/26/17 11/26/17 11/27/17





 15:00 23:00 07:00


 


Intake Total 100 ml 1660 ml 1210 ml


 


Output Total   3 ml


 


Balance 100 ml 1660 ml 1207 ml











Exam


Constitutional:  alert, oriented


Neck:  supple


Respiratory:  normal air movement


Cardiovascular:  nl pulses


Gastrointestinal:  other (Right upper quadrant drain), soft


Musculoskeletal:  nl extremities to inspection


Extremities:  normal pulses


Neurological:  nl mental status





Results


Result Diagram:  


11/27/17 0514                                                                  

              11/27/17 0514





Results 24 hrs





Laboratory Tests








Test


  11/26/17


20:40 11/27/17


05:14 11/27/17


08:10 11/27/17


11:58


 


Bedside Glucose 131    123   153  


 


White Blood Count  12.0  H  


 


Red Blood Count  3.39  L  


 


Hemoglobin  9.2  L  


 


Hematocrit  28.0  L  


 


Mean Corpuscular Volume  82.6    


 


Mean Corpuscular Hemoglobin  27.1  L  


 


Mean Corpuscular Hemoglobin


Concent 


  32.9  


  


  


 


 


Red Cell Distribution Width  17.2  H  


 


Platelet Count  420  H  


 


Mean Platelet Volume  9.0    


 


Neutrophils %  71.8    


 


Lymphocytes %  15.5    


 


Monocytes %  6.8    


 


Eosinophils %  4.7    


 


Basophils %  0.7    


 


Nucleated Red Blood Cells %  0.0    


 


Neutrophils #  8.6  H  


 


Lymphocytes #  1.9    


 


Monocytes #  0.8    


 


Eosinophils #  0.6  H  


 


Basophils #  0.1    


 


Nucleated Red Blood Cells #  0.0    


 


Sodium Level  139    


 


Potassium Level  3.3  L  


 


Chloride Level  107    


 


Carbon Dioxide Level  25    


 


Anion Gap  10  #  


 


Blood Urea Nitrogen  2  L  


 


Creatinine  0.48    


 


Glucose Level  108    


 


Calcium Level  8.0  L  


 


Total Bilirubin  0.4    


 


Direct Bilirubin  0.00    


 


Indirect Bilirubin  0.4    


 


Aspartate Amino Transf


(AST/SGOT) 


  13  L


  


  


 


 


Alanine Aminotransferase


(ALT/SGPT) 


  31  


  


  


 


 


Alkaline Phosphatase  139  H  


 


Total Protein  6.2    


 


Albumin  2.7  L  


 


Globulin  3.50  H  


 


Albumin/Globulin Ratio  0.77    


 


Amylase Level  39    


 


Lipase  24    














Test


  11/27/17


17:16 


  


  


 


 


Bedside Glucose 124     











Medications


Medications





 Current Medications


Hydralazine HCl (Apresoline) 10 mg Q6H  PRN IV SBP>170 Last administered on 11/ 25/17at 15:11; Admin Dose 10 MG;  Start 11/17/17 at 11:30


Morphine Sulfate (morphine) 1 mg Q4H  PRN IV PAIN Last administered on 11/24/ 17at 03:56; Admin Dose 1 MG;  Start 11/17/17 at 12:30


Ondansetron HCl (Zofran Inj) 4 mg Q4H  PRN IV NAUSEA AND/OR VOMITING Last 

administered on 11/27/17at 06:18; Admin Dose 4 MG;  Start 11/17/17 at 13:00


Acetaminophen (Tylenol Tab) 650 mg Q4H  PRN PO PAIN AND OR ELEVATED TEMP Last 

administered on 11/26/17at 01:28; Admin Dose 650 MG;  Start 11/17/17 at 13:00


Pantoprazole (Protonix Tab) 40 mg DAILY@06 PO  Last administered on 11/27/17at 

06:19; Admin Dose 40 MG;  Start 11/18/17 at 06:00


Al Hydrox/Mg Hydrox/Simethicone (Mag-Al Plus) 30 ml Q6H  PRN PO 

GASTROINTESTINAL UPSET;  Start 11/17/17 at 20:00


Folic Acid (Folic Acid) 1 mg DAILY PO  Last administered on 11/27/17at 08:41; 

Admin Dose 1 MG;  Start 11/18/17 at 10:00


Levothyroxine Sodium (Synthroid) 50 mcg DAILY@06 PO  Last administered on 11/27/ 17at 06:19; Admin Dose 50 MCG;  Start 11/19/17 at 06:00


Valsartan (Diovan) 160 mg DAILY PO  Last administered on 11/27/17at 08:41; 

Admin Dose 160 MG;  Start 11/18/17 at 10:00


EZETIMIBE (Zetia) 10 mg DAILY PO  Last administered on 11/27/17at 08:41; Admin 

Dose 10 MG;  Start 11/18/17 at 10:00


Ibuprofen 800 mg 800 mg Q6H  PRN PO PAIN;  Start 11/18/17 at 10:00


Sodium Chloride (NS) 1,000 ml @  70 mls/hr [id number] IV  Last administered on 11/ 27/17at 10:03; Admin Dose 70 MLS/HR;  Start 11/18/17 at 12:30


Tramadol HCl (Ultram) 50 mg Q6H  PRN PO PAIN LEVEL 7-10 Last administered on 11/ 19/17at 13:46; Admin Dose 50 MG;  Start 11/18/17 at 12:30;  Status Future Hold


Cholecalciferol (Vitamin D) 4,000 unit DAILY PO  Last administered on 11/27/ 17at 08:41; Admin Dose 4,000 UNIT;  Start 11/19/17 at 09:00


Cholecalciferol 1000 unit 1,000 unit DAILY PO  Last administered on 11/27/17at 

08:41; Admin Dose 1,000 UNIT;  Start 11/19/17 at 09:00


Linezolid 300 ml @  200 mls/hr Q12H IVPB  Last administered on 11/27/17at 17:23

; Admin Dose 200 MLS/HR;  Start 11/19/17 at 17:00


Metronidazole (Flagyl 500 Mg (Pmx)) 100 ml @  100 mls/hr Q8 IVPB  Last 

administered on 11/27/17at 13:58; Admin Dose 100 MLS/HR;  Start 11/20/17 at 22:

00


Senna/Docusate Sodium (Senokot-S) 1 tab BID PO  Last administered on 11/27/17at 

08:41; Admin Dose 1 TAB;  Start 11/20/17 at 21:00


Zolpidem Tartrate 5 mg 5 mg HS  PRN PO INSOMNIA;  Start 11/24/17 at 19:00


Sodium Chloride (NS) 500 ml @  70 mls/hr Q7H9M IV  Last administered on 11/27/ 17at 01:27; Admin Dose 70 MLS/HR;  Start 11/26/17 at 04:00











RADHA DE JESUS Nov 27, 2017 17:51

## 2017-11-28 VITALS — DIASTOLIC BLOOD PRESSURE: 71 MMHG | SYSTOLIC BLOOD PRESSURE: 155 MMHG | RESPIRATION RATE: 16 BRPM

## 2017-11-28 VITALS — SYSTOLIC BLOOD PRESSURE: 182 MMHG | RESPIRATION RATE: 17 BRPM | DIASTOLIC BLOOD PRESSURE: 74 MMHG

## 2017-11-28 VITALS — RESPIRATION RATE: 16 BRPM | SYSTOLIC BLOOD PRESSURE: 139 MMHG | DIASTOLIC BLOOD PRESSURE: 65 MMHG

## 2017-11-28 VITALS — SYSTOLIC BLOOD PRESSURE: 164 MMHG | RESPIRATION RATE: 20 BRPM | DIASTOLIC BLOOD PRESSURE: 75 MMHG

## 2017-11-28 LAB
ANION GAP SERPL CALC-SCNC: 12 MMOL/L (ref 8–16)
BASOPHILS # BLD AUTO: 0.1 10^3/UL (ref 0–0.1)
BASOPHILS NFR BLD: 0.8 % (ref 0–2)
BUN SERPL-MCNC: < 2 MG/DL (ref 7–20)
CALCIUM SERPL-MCNC: 8 MG/DL (ref 8.4–10.2)
CHLORIDE SERPL-SCNC: 103 MMOL/L (ref 97–110)
CO2 SERPL-SCNC: 25 MMOL/L (ref 21–31)
CREAT SERPL-MCNC: 0.43 MG/DL (ref 0.44–1)
EOSINOPHIL # BLD: 0.5 10^3/UL (ref 0–0.5)
EOSINOPHIL NFR BLD: 5.7 % (ref 0–7)
ERYTHROCYTE [DISTWIDTH] IN BLOOD BY AUTOMATED COUNT: 17.2 % (ref 11.5–14.5)
GLUCOSE SERPL-MCNC: 128 MG/DL (ref 70–220)
HCT VFR BLD CALC: 29.2 % (ref 37–47)
HGB BLD-MCNC: 9.4 G/DL (ref 12–16)
LYMPHOCYTES # BLD AUTO: 1.2 10^3/UL (ref 0.8–2.9)
LYMPHOCYTES NFR BLD AUTO: 13.9 % (ref 15–51)
MCH RBC QN AUTO: 26.5 PG (ref 29–33)
MCHC RBC AUTO-ENTMCNC: 32.2 G/DL (ref 32–37)
MCV RBC AUTO: 82.3 FL (ref 82–101)
MONOCYTES # BLD: 0.7 10^3/UL (ref 0.3–0.9)
MONOCYTES NFR BLD: 7.9 % (ref 0–11)
NEUTROPHILS # BLD: 5.9 10^3/UL (ref 1.6–7.5)
NEUTROPHILS NFR BLD AUTO: 71.2 % (ref 39–77)
NRBC # BLD MANUAL: 0 10^3/UL (ref 0–0)
NRBC BLD AUTO-RTO: 0 /100WBC (ref 0–0)
PLATELET # BLD: 373 10^3/UL (ref 140–415)
PMV BLD AUTO: 8.3 FL (ref 7.4–10.4)
POTASSIUM SERPL-SCNC: 3.1 MMOL/L (ref 3.5–5.1)
RBC # BLD AUTO: 3.55 10^6/UL (ref 4.2–5.4)
SODIUM SERPL-SCNC: 137 MMOL/L (ref 135–144)
WBC # BLD AUTO: 8.3 10^3/UL (ref 4.8–10.8)

## 2017-11-28 RX ADMIN — FOLIC ACID SCH MLS/HR: 5 INJECTION, SOLUTION INTRAMUSCULAR; INTRAVENOUS; SUBCUTANEOUS at 03:44

## 2017-11-28 RX ADMIN — Medication SCH MLS/HR: at 14:58

## 2017-11-28 RX ADMIN — ACARBOSE SCH MG: 50 TABLET ORAL at 17:37

## 2017-11-28 RX ADMIN — NYSTATIN SCH ML: 100000 SUSPENSION ORAL at 23:35

## 2017-11-28 RX ADMIN — Medication SCH MLS/HR: at 21:40

## 2017-11-28 RX ADMIN — FOLIC ACID SCH MLS/HR: 5 INJECTION, SOLUTION INTRAMUSCULAR; INTRAVENOUS; SUBCUTANEOUS at 14:59

## 2017-11-28 RX ADMIN — LIDOCAINE HYDROCHLORIDE SCH MLS/HR: 10 INJECTION, SOLUTION EPIDURAL; INFILTRATION; INTRACAUDAL; PERINEURAL at 06:03

## 2017-11-28 RX ADMIN — Medication SCH MLS/HR: at 06:18

## 2017-11-28 RX ADMIN — FOLIC ACID SCH MG: 1 TABLET ORAL at 08:39

## 2017-11-28 RX ADMIN — VALSARTAN SCH MG: 160 TABLET, FILM COATED ORAL at 20:48

## 2017-11-28 RX ADMIN — LIDOCAINE HYDROCHLORIDE SCH MLS/HR: 10 INJECTION, SOLUTION EPIDURAL; INFILTRATION; INTRACAUDAL; PERINEURAL at 13:12

## 2017-11-28 RX ADMIN — NYSTATIN SCH ML: 100000 SUSPENSION ORAL at 12:32

## 2017-11-28 RX ADMIN — ACARBOSE SCH MG: 50 TABLET ORAL at 08:39

## 2017-11-28 RX ADMIN — LEVOTHYROXINE SODIUM SCH MCG: 50 TABLET ORAL at 06:00

## 2017-11-28 RX ADMIN — NYSTATIN SCH ML: 100000 SUSPENSION ORAL at 06:18

## 2017-11-28 RX ADMIN — PANTOPRAZOLE SODIUM SCH MG: 40 TABLET, DELAYED RELEASE ORAL at 06:18

## 2017-11-28 RX ADMIN — EZETIMIBE SCH MG: 10 TABLET ORAL at 08:39

## 2017-11-28 RX ADMIN — DOCUSATE SODIUM AND SENNOSIDES SCH TAB: 8.6; 5 TABLET, FILM COATED ORAL at 08:39

## 2017-11-28 RX ADMIN — DOCUSATE SODIUM AND SENNOSIDES SCH TAB: 8.6; 5 TABLET, FILM COATED ORAL at 20:49

## 2017-11-28 RX ADMIN — FOLIC ACID SCH MLS/HR: 5 INJECTION, SOLUTION INTRAMUSCULAR; INTRAVENOUS; SUBCUTANEOUS at 01:18

## 2017-11-28 RX ADMIN — SODIUM CHLORIDE AND POTASSIUM CHLORIDE SCH MLS/HR: 4.5; 1.49 INJECTION, SOLUTION INTRAVENOUS at 16:17

## 2017-11-28 RX ADMIN — VITAMIN D, TAB 1000IU (100/BT) SCH UNIT: 25 TAB at 08:38

## 2017-11-28 RX ADMIN — LINEZOLID SCH MLS/HR: 600 INJECTION, SOLUTION INTRAVENOUS at 16:16

## 2017-11-28 RX ADMIN — LEVOTHYROXINE SODIUM SCH MCG: 50 TABLET ORAL at 06:18

## 2017-11-28 RX ADMIN — LINEZOLID SCH MLS/HR: 600 INJECTION, SOLUTION INTRAVENOUS at 04:37

## 2017-11-28 RX ADMIN — NYSTATIN SCH ML: 100000 SUSPENSION ORAL at 01:01

## 2017-11-28 RX ADMIN — VALSARTAN SCH MG: 160 TABLET, FILM COATED ORAL at 08:39

## 2017-11-28 RX ADMIN — NYSTATIN SCH ML: 100000 SUSPENSION ORAL at 17:37

## 2017-11-28 RX ADMIN — DEXAMETHASONE SODIUM PHOSPHATE PRN MG: 10 INJECTION, SOLUTION INTRAMUSCULAR; INTRAVENOUS at 06:18

## 2017-11-28 NOTE — CONS
Date/Time of Note


Date/Time of Note


DATE: 11/28/17 


TIME: 20:05





Assessment/Plan


Assessment/Plan


Chief Complaint/Hosp Course


- Gall bladder fossa abscess with VRE and Bacteroides; s/p CT guided drainage 11 /17/2017


- Biliary leak, s/p ERCP and stent placement into the right hepatic duct and 

left hepatic duct 11/20/2017


- S/p  laparoscopic cholecystectomy 10/25/17.


- Leukocytosis - resolved


- Moderate to large hiatal hernia per MRCP


- Diverticulosis without diverticulitis per MRCP


- HTN


- Hypothyroid


- Oral candidiasis


- PCN allergy; tolerated meropenem





Recommendations:


- Monitor for nausea- could be related to metronidazole--if persists then 

consideration of changing to Ertapenem could be entertained


- Continue Linezolid (11/19/2017-) and Metronidazole (11/20/2017-) x 14 days at 

least


- Repeat imaging prior to cessation of abx


- Continue nystatin


Management d/w Dr. Pham


Problems:  





Consultation Date/Type/Reason


Admit Date/Time


Nov 16, 2017 at 14:00


Initial Consult Date


11/20/17


Type of Consultation:  Infectious Disease


Referring Provider:  VIOLETTE GALDAMEZ MD





24 HR Interval Summary


Free Text/Dictation


Denies pain, SOB, n/v/d but per chart review, pt was medicated with zofran for 

nausea earlier this AM. 


States eating w/o difficulty.





Exam/Review of Systems


Vital Signs


Vitals





 Vital Signs








  Date Time  Temp Pulse Resp B/P Pulse Ox O2 Delivery O2 Flow Rate FiO2


 


11/28/17 14:40 98.1 84 16 155/71 98   


 


11/27/17 14:05      Room Air  














 Intake and Output   


 


 11/27/17 11/27/17 11/28/17





 15:00 23:00 07:00


 


Intake Total 770 ml 620 ml 1200 ml


 


Output Total  5 ml 


 


Balance 770 ml 615 ml 1200 ml











Exam


Constitutional:  alert, oriented, well developed


Head:  atraumatic, normocephalic


ENMT:  mucosa pink and moist, other (tongue with off white coating)


Neck:  supple


Respiratory:  clear to auscultation, normal air movement


Cardiovascular:  nl pulses, regular rate and rhythm


Gastrointestinal:  other (RUQ drain with min TTP), soft, surgical scars


Extremities:  normal pulses


Neurological:  nl mental status


Skin:  nl turgor, 


   No rash or lesions





Results


Result Diagram:  


11/28/17 0806                                                                  

              11/28/17 0806





Results 24 hrs





Laboratory Tests








Test


  11/27/17


21:14 11/28/17


08:06 11/28/17


08:35 11/28/17


11:59


 


Bedside Glucose 113    116   122  


 


White Blood Count  8.3  #  


 


Red Blood Count  3.55  L  


 


Hemoglobin  9.4  L  


 


Hematocrit  29.2  L  


 


Mean Corpuscular Volume  82.3    


 


Mean Corpuscular Hemoglobin  26.5  L  


 


Mean Corpuscular Hemoglobin


Concent 


  32.2  


  


  


 


 


Red Cell Distribution Width  17.2  H  


 


Platelet Count  373    


 


Mean Platelet Volume  8.3    


 


Neutrophils %  71.2    


 


Lymphocytes %  13.9  L  


 


Monocytes %  7.9    


 


Eosinophils %  5.7    


 


Basophils %  0.8    


 


Nucleated Red Blood Cells %  0.0    


 


Neutrophils #  5.9    


 


Lymphocytes #  1.2    


 


Monocytes #  0.7    


 


Eosinophils #  0.5    


 


Basophils #  0.1    


 


Nucleated Red Blood Cells #  0.0    


 


Sodium Level  137    


 


Potassium Level  3.1  L  


 


Chloride Level  103    


 


Carbon Dioxide Level  25    


 


Anion Gap  12    


 


Blood Urea Nitrogen  < 2  L  


 


Creatinine  0.43  L  


 


Glucose Level  128    


 


Calcium Level  8.0  L  














Test


  11/28/17


17:22 


  


  


 


 


Bedside Glucose 134     











Medications


Medications





 Current Medications


Hydralazine HCl (Apresoline) 10 mg Q6H  PRN IV SBP>170 Last administered on 11/ 25/17at 15:11; Admin Dose 10 MG;  Start 11/17/17 at 11:30


Morphine Sulfate (morphine) 1 mg Q4H  PRN IV PAIN Last administered on 11/24/ 17at 03:56; Admin Dose 1 MG;  Start 11/17/17 at 12:30


Ondansetron HCl (Zofran Inj) 4 mg Q4H  PRN IV NAUSEA AND/OR VOMITING Last 

administered on 11/28/17at 06:18; Admin Dose 4 MG;  Start 11/17/17 at 13:00


Acetaminophen (Tylenol Tab) 650 mg Q4H  PRN PO PAIN AND OR ELEVATED TEMP Last 

administered on 11/26/17at 01:28; Admin Dose 650 MG;  Start 11/17/17 at 13:00


Pantoprazole (Protonix Tab) 40 mg DAILY@06 PO  Last administered on 11/28/17at 

06:18; Admin Dose 40 MG;  Start 11/18/17 at 06:00


Al Hydrox/Mg Hydrox/Simethicone (Mag-Al Plus) 30 ml Q6H  PRN PO 

GASTROINTESTINAL UPSET;  Start 11/17/17 at 20:00


Folic Acid (Folic Acid) 1 mg DAILY PO  Last administered on 11/28/17at 08:39; 

Admin Dose 1 MG;  Start 11/18/17 at 10:00


Levothyroxine Sodium (Synthroid) 50 mcg DAILY@06 PO  Last administered on 11/27/ 17at 06:19; Admin Dose 50 MCG;  Start 11/19/17 at 06:00


EZETIMIBE (Zetia) 10 mg DAILY PO  Last administered on 11/28/17at 08:39; Admin 

Dose 10 MG;  Start 11/18/17 at 10:00


Ibuprofen (Motrin) 800 mg Q6H  PRN PO PAIN;  Start 11/18/17 at 10:00


Tramadol HCl (Ultram) 50 mg Q6H  PRN PO PAIN LEVEL 7-10 Last administered on 11/ 19/17at 13:46; Admin Dose 50 MG;  Start 11/18/17 at 12:30;  Status Future Hold


Cholecalciferol (Vitamin D) 4,000 unit DAILY PO  Last administered on 11/28/ 17at 08:39; Admin Dose 4,000 UNIT;  Start 11/19/17 at 09:00


Cholecalciferol 1000 unit 1,000 unit DAILY PO  Last administered on 11/28/17at 

08:38; Admin Dose 1,000 UNIT;  Start 11/19/17 at 09:00


Linezolid 300 ml @  200 mls/hr Q12H IVPB  Last administered on 11/28/17at 16:16

; Admin Dose 200 MLS/HR;  Start 11/19/17 at 17:00


Metronidazole (Flagyl 500 Mg (Pmx)) 100 ml @  100 mls/hr Q8 IVPB  Last 

administered on 11/28/17at 14:58; Admin Dose 100 MLS/HR;  Start 11/20/17 at 22:

00


Senna/Docusate Sodium (Senokot-S) 1 tab BID PO  Last administered on 11/28/17at 

08:39; Admin Dose 1 TAB;  Start 11/20/17 at 21:00


Zolpidem Tartrate (Ambien) 5 mg HS  PRN PO INSOMNIA;  Start 11/24/17 at 19:00


Nystatin 5 ml 5 ml Q6 PO  Last administered on 11/28/17at 17:37; Admin Dose 5 ML

;  Start 11/28/17 at 01:00


Potassium Chloride/Sodium Chloride (1/2 NS + KCl 20 Meq) 1,000 ml @  50 mls/hr 

Q20H IV  Last administered on 11/28/17at 16:17; Admin Dose 50 MLS/HR;  Start 11/ 28/17 at 15:00


Phenyleph/Shark Oil/Min Oil/Petrol (Formulation R Oint) 1 applic BID GA ;  

Start 11/28/17 at 21:00


Valsartan (Diovan) 160 mg BID PO ;  Start 11/28/17 at 21:00





Procedures


Procedures


MRCP 11/24/17:  


1.  Gallbladder is surgically absent. Percutaneous drainage catheter tip 

remains within the gallbladder fossa. There has been near-complete interval 

resolution of previously seen complex collection in this area.  


2.  No biliary dilatation or evidence of choledocholithiasis is identified.


3.  Mild right and small left pleural effusions are noted, increased from the 

prior CT.


4.  Moderate to large hiatal hernia is again noted, grossly unchanged.


5.  Colonic diverticulosis is seen without diverticulitis.











LEE RUIZ NP Nov 28, 2017 20:06





LEE RUIZ NP Nov 28, 2017 20:06

## 2017-11-28 NOTE — PN
Date/Time of Note


Date/Time of Note


DATE: 11/28/17 


TIME: 07:45





Assessment/Plan


Lines/Catheters


IV Catheter Type (from [facility]):  Peripheral IV


Riojas in Place (from [facility]):  No





Assessment/Plan


Chief Complaint/Hosp Course


1.  Gallbladder fossa abscess/bile leak.  +HIDA s/p ERCP with 2 stents. MRCP 

noted.


-cont drain care- please keep sterile dressing on


-abx


-pain management


-consider dc home if wbc improving


2. Leukocytosis: Recurred;  improving.


-as above


3. Elevated alk phos: likely 2/2 #1.  Slowly improving.


-as above


4. Diverticulosis


-diet/lifestyle optimization


3. Hiatal hernia: asymptomatic


-monitor


4. Atherosclerosis


-diet/exercise optimization


5. Pleural effusions: 


-pulmonary toilette


-pulmonary consult


6. Thrush


-medical management


7. Persistent nausea: 


-abx change per ID


-supportive


8. Hypertension: 


-improve bp control 





Thank you. Patient seen and examined in collaboration with Dr. Theodore Edwards.


Problems:  





Subjective


24 Hr Interval Summary


Anxious. Continues to have nausea, but no vomiting. Hypertensive. No fevers, 

chills, sob, congested cough, cp, palpitations, ha, dizziness, n/v/d/dysuria. 

Scant output from drain.





Exam/Review of Systems


Vital Signs


Vitals





 Vital Signs








  Date Time  Temp Pulse Resp B/P Pulse Ox O2 Delivery O2 Flow Rate FiO2


 


11/28/17 02:17 98.8 80 20 164/75 98   


 


11/27/17 14:05      Room Air  














 Intake and Output   


 


 11/27/17 11/27/17 11/28/17





 14:59 22:59 06:59


 


Intake Total 770 ml 620 ml 1200 ml


 


Output Total  5 ml 


 


Balance 770 ml 615 ml 1200 ml











Exam


Free Text/Dictation


Constitutional:  alert, oriented


Psych:  anxious


Head:  atraumatic, normocephalic


Eyes:  nl lids, nl sclera


ENMT:  mucosa pink and moist, nl nasal mucosa & septum, tongue with white 

plaques


Neck:  non-tender, supple


Respiratory:  clear to auscultation, normal air movement


Cardiovascular:  nl pulses, regular rate and rhythm


Gastrointestinal:  min-tender at drain site, soft, surgical scars (dry without 

drainage/bruising/discoloration), drain with dark yellow drainage


Musculoskeletal:  nl extremities to inspection, nl gait and stance


Extremities:  normal pulses


Neurological:  nl mental status, nl speech, nl strength


Skin:  nl turgor, rash or lesions


Lymph:  nl lymph nodes





Results


Result Diagram:  


11/27/17 0514                                                                  

              11/27/17 0514














SHILPI IZAGUIRRE NP Nov 28, 2017 07:54

## 2017-11-28 NOTE — PN
Date/Time of Note


Date/Time of Note


DATE: 11/28/17 


TIME: 18:11





Assessment/Plan


VTE Prophylaxis


VTE Prophylaxis Intervention:  SCD's





Lines/Catheters


IV Catheter Type (from [facility]):  Peripheral IV


Urinary Cath still in place:  No





Assessment/Plan


Chief Complaint/Hosp Course


Patient complains of occasional nausea, oral candidiasis noted.  Mild 

hypokalemia, potassium replaced.  Hypertensive,  will increase Diovan to twice 

daily


Assessment/Plan


-Gallbladder fossa abscess/bile leak. S/p drainage by radiology.  Positive HIDA 

scan. S/p ERCP and stent placement into the right hepatic duct and left hepatic 

duct on 11/20 by Dr Knapp.  Abdominal fluid culture positive for VRE, continue 

Zyvox.  Dr. Pham is following in infection disease consultation


-S/p  laparoscopic cholecystectomy 11/26/17.  Dr. Edwards is following in 

surgical consultation.


-Status post ERCP with stent placement status post subsequent stent removal


-Oral candidiasis, continue nystatin 


-Hypertension, continue Diovan and hydralazine.


-Hypothyroidism, continue Synthroid


-RA





Further recommendations based on clinical course.  Plan of care discussed with 

Dr. Brooks


Problems:  





Exam/Review of Systems


Vital Signs


Vitals





 Vital Signs








  Date Time  Temp Pulse Resp B/P Pulse Ox O2 Delivery O2 Flow Rate FiO2


 


11/28/17 14:40 98.1 84 16 155/71 98   


 


11/27/17 14:05      Room Air  














 Intake and Output   


 


 11/27/17 11/27/17 11/28/17





 14:59 22:59 06:59


 


Intake Total 770 ml 620 ml 1200 ml


 


Output Total  5 ml 


 


Balance 770 ml 615 ml 1200 ml











Exam


Constitutional:  alert, oriented


Neck:  supple


Respiratory:  normal air movement


Cardiovascular:  nl pulses


Gastrointestinal:  other (Right upper quadrant drain), soft


Musculoskeletal:  nl extremities to inspection


Extremities:  normal pulses


Neurological:  nl mental status





Results


Result Diagram:  


11/28/17 0806                                                                  

              11/28/17 0806





Results 24 hrs





Laboratory Tests








Test


  11/27/17


21:14 11/28/17


08:06 11/28/17


08:35 11/28/17


11:59


 


Bedside Glucose 113    116   122  


 


White Blood Count  8.3  #  


 


Red Blood Count  3.55  L  


 


Hemoglobin  9.4  L  


 


Hematocrit  29.2  L  


 


Mean Corpuscular Volume  82.3    


 


Mean Corpuscular Hemoglobin  26.5  L  


 


Mean Corpuscular Hemoglobin


Concent 


  32.2  


  


  


 


 


Red Cell Distribution Width  17.2  H  


 


Platelet Count  373    


 


Mean Platelet Volume  8.3    


 


Neutrophils %  71.2    


 


Lymphocytes %  13.9  L  


 


Monocytes %  7.9    


 


Eosinophils %  5.7    


 


Basophils %  0.8    


 


Nucleated Red Blood Cells %  0.0    


 


Neutrophils #  5.9    


 


Lymphocytes #  1.2    


 


Monocytes #  0.7    


 


Eosinophils #  0.5    


 


Basophils #  0.1    


 


Nucleated Red Blood Cells #  0.0    


 


Sodium Level  137    


 


Potassium Level  3.1  L  


 


Chloride Level  103    


 


Carbon Dioxide Level  25    


 


Anion Gap  12    


 


Blood Urea Nitrogen  < 2  L  


 


Creatinine  0.43  L  


 


Glucose Level  128    


 


Calcium Level  8.0  L  














Test


  11/28/17


17:22 


  


  


 


 


Bedside Glucose 134     











Medications


Medications





 Current Medications


Hydralazine HCl (Apresoline) 10 mg Q6H  PRN IV SBP>170 Last administered on 11/ 25/17at 15:11; Admin Dose 10 MG;  Start 11/17/17 at 11:30


Morphine Sulfate (morphine) 1 mg Q4H  PRN IV PAIN Last administered on 11/24/ 17at 03:56; Admin Dose 1 MG;  Start 11/17/17 at 12:30


Ondansetron HCl (Zofran Inj) 4 mg Q4H  PRN IV NAUSEA AND/OR VOMITING Last 

administered on 11/28/17at 06:18; Admin Dose 4 MG;  Start 11/17/17 at 13:00


Acetaminophen (Tylenol Tab) 650 mg Q4H  PRN PO PAIN AND OR ELEVATED TEMP Last 

administered on 11/26/17at 01:28; Admin Dose 650 MG;  Start 11/17/17 at 13:00


Pantoprazole (Protonix Tab) 40 mg DAILY@06 PO  Last administered on 11/28/17at 

06:18; Admin Dose 40 MG;  Start 11/18/17 at 06:00


Al Hydrox/Mg Hydrox/Simethicone (Mag-Al Plus) 30 ml Q6H  PRN PO 

GASTROINTESTINAL UPSET;  Start 11/17/17 at 20:00


Folic Acid (Folic Acid) 1 mg DAILY PO  Last administered on 11/28/17at 08:39; 

Admin Dose 1 MG;  Start 11/18/17 at 10:00


Levothyroxine Sodium (Synthroid) 50 mcg DAILY@06 PO  Last administered on 11/27/ 17at 06:19; Admin Dose 50 MCG;  Start 11/19/17 at 06:00


Valsartan (Diovan) 160 mg DAILY PO  Last administered on 11/28/17at 08:39; 

Admin Dose 160 MG;  Start 11/18/17 at 10:00


EZETIMIBE (Zetia) 10 mg DAILY PO  Last administered on 11/28/17at 08:39; Admin 

Dose 10 MG;  Start 11/18/17 at 10:00


Ibuprofen (Motrin) 800 mg Q6H  PRN PO PAIN;  Start 11/18/17 at 10:00


Tramadol HCl (Ultram) 50 mg Q6H  PRN PO PAIN LEVEL 7-10 Last administered on 11/ 19/17at 13:46; Admin Dose 50 MG;  Start 11/18/17 at 12:30;  Status Future Hold


Cholecalciferol (Vitamin D) 4,000 unit DAILY PO  Last administered on 11/28/ 17at 08:39; Admin Dose 4,000 UNIT;  Start 11/19/17 at 09:00


Cholecalciferol 1000 unit 1,000 unit DAILY PO  Last administered on 11/28/17at 

08:38; Admin Dose 1,000 UNIT;  Start 11/19/17 at 09:00


Linezolid 300 ml @  200 mls/hr Q12H IVPB  Last administered on 11/28/17at 16:16

; Admin Dose 200 MLS/HR;  Start 11/19/17 at 17:00


Metronidazole (Flagyl 500 Mg (Pmx)) 100 ml @  100 mls/hr Q8 IVPB  Last 

administered on 11/28/17at 14:58; Admin Dose 100 MLS/HR;  Start 11/20/17 at 22:

00


Senna/Docusate Sodium (Senokot-S) 1 tab BID PO  Last administered on 11/28/17at 

08:39; Admin Dose 1 TAB;  Start 11/20/17 at 21:00


Zolpidem Tartrate (Ambien) 5 mg HS  PRN PO INSOMNIA;  Start 11/24/17 at 19:00


Nystatin 5 ml 5 ml Q6 PO  Last administered on 11/28/17at 17:37; Admin Dose 5 ML

;  Start 11/28/17 at 01:00


Potassium Chloride 250 ml @  62.5 mls/hr ONCE  ONCE IVPB  Last administered on 

11/28/17at 17:37; Admin Dose 62.5 MLS/HR;  Start 11/28/17 at 15:00;  Stop 11/28/ 17 at 18:59


Potassium Chloride/Sodium Chloride (1/2 NS + KCl 20 Meq) 1,000 ml @  50 mls/hr 

Q20H IV  Last administered on 11/28/17at 16:17; Admin Dose 50 MLS/HR;  Start 11/ 28/17 at 15:00


Phenyleph/Shark Oil/Min Oil/Petrol (Formulation R Oint) 1 applic BID ME ;  

Start 11/28/17 at 21:00











RADHA DE JESUS Nov 28, 2017 18:16

## 2017-11-29 VITALS — RESPIRATION RATE: 18 BRPM | DIASTOLIC BLOOD PRESSURE: 66 MMHG | SYSTOLIC BLOOD PRESSURE: 144 MMHG

## 2017-11-29 VITALS — SYSTOLIC BLOOD PRESSURE: 165 MMHG | DIASTOLIC BLOOD PRESSURE: 70 MMHG | RESPIRATION RATE: 16 BRPM

## 2017-11-29 VITALS — SYSTOLIC BLOOD PRESSURE: 157 MMHG | RESPIRATION RATE: 18 BRPM | DIASTOLIC BLOOD PRESSURE: 70 MMHG

## 2017-11-29 LAB
ANION GAP SERPL CALC-SCNC: 10 MMOL/L (ref 8–16)
BASOPHILS # BLD AUTO: 0.1 10^3/UL (ref 0–0.1)
BASOPHILS NFR BLD: 1.1 % (ref 0–2)
BUN SERPL-MCNC: < 2 MG/DL (ref 7–20)
CALCIUM SERPL-MCNC: 7.8 MG/DL (ref 8.4–10.2)
CHLORIDE SERPL-SCNC: 105 MMOL/L (ref 97–110)
CO2 SERPL-SCNC: 25 MMOL/L (ref 21–31)
CREAT SERPL-MCNC: 0.46 MG/DL (ref 0.44–1)
EOSINOPHIL # BLD: 0.4 10^3/UL (ref 0–0.5)
EOSINOPHIL NFR BLD: 5.7 % (ref 0–7)
ERYTHROCYTE [DISTWIDTH] IN BLOOD BY AUTOMATED COUNT: 17.3 % (ref 11.5–14.5)
GLUCOSE SERPL-MCNC: 140 MG/DL (ref 70–220)
HCT VFR BLD CALC: 26 % (ref 37–47)
HGB BLD-MCNC: 8.5 G/DL (ref 12–16)
LYMPHOCYTES # BLD AUTO: 1.3 10^3/UL (ref 0.8–2.9)
LYMPHOCYTES NFR BLD AUTO: 18.1 % (ref 15–51)
MCH RBC QN AUTO: 27 PG (ref 29–33)
MCHC RBC AUTO-ENTMCNC: 32.7 G/DL (ref 32–37)
MCV RBC AUTO: 82.5 FL (ref 82–101)
MONOCYTES # BLD: 0.8 10^3/UL (ref 0.3–0.9)
MONOCYTES NFR BLD: 10.2 % (ref 0–11)
NEUTROPHILS # BLD: 4.7 10^3/UL (ref 1.6–7.5)
NEUTROPHILS NFR BLD AUTO: 64.6 % (ref 39–77)
NRBC # BLD MANUAL: 0 10^3/UL (ref 0–0)
NRBC BLD AUTO-RTO: 0 /100WBC (ref 0–0)
PLATELET # BLD: 343 10^3/UL (ref 140–415)
PMV BLD AUTO: 9 FL (ref 7.4–10.4)
POTASSIUM SERPL-SCNC: 3.7 MMOL/L (ref 3.5–5.1)
RBC # BLD AUTO: 3.15 10^6/UL (ref 4.2–5.4)
SODIUM SERPL-SCNC: 136 MMOL/L (ref 135–144)
WBC # BLD AUTO: 7.3 10^3/UL (ref 4.8–10.8)

## 2017-11-29 RX ADMIN — NYSTATIN SCH ML: 100000 SUSPENSION ORAL at 11:35

## 2017-11-29 RX ADMIN — Medication SCH MLS/HR: at 06:41

## 2017-11-29 RX ADMIN — NYSTATIN SCH ML: 100000 SUSPENSION ORAL at 05:47

## 2017-11-29 RX ADMIN — VITAMIN D, TAB 1000IU (100/BT) SCH UNIT: 25 TAB at 09:23

## 2017-11-29 RX ADMIN — ACARBOSE SCH MG: 50 TABLET ORAL at 09:24

## 2017-11-29 RX ADMIN — EZETIMIBE SCH MG: 10 TABLET ORAL at 09:24

## 2017-11-29 RX ADMIN — LEVOTHYROXINE SODIUM SCH MCG: 50 TABLET ORAL at 05:47

## 2017-11-29 RX ADMIN — FOLIC ACID SCH MG: 1 TABLET ORAL at 09:23

## 2017-11-29 RX ADMIN — DOCUSATE SODIUM AND SENNOSIDES SCH TAB: 8.6; 5 TABLET, FILM COATED ORAL at 09:23

## 2017-11-29 RX ADMIN — SODIUM CHLORIDE AND POTASSIUM CHLORIDE SCH MLS/HR: 4.5; 1.49 INJECTION, SOLUTION INTRAVENOUS at 11:00

## 2017-11-29 RX ADMIN — LINEZOLID SCH MLS/HR: 600 INJECTION, SOLUTION INTRAVENOUS at 04:34

## 2017-11-29 RX ADMIN — PANTOPRAZOLE SODIUM SCH MG: 40 TABLET, DELAYED RELEASE ORAL at 05:47

## 2017-11-29 RX ADMIN — VALSARTAN SCH MG: 160 TABLET, FILM COATED ORAL at 09:24

## 2017-11-29 RX ADMIN — Medication SCH MLS/HR: at 13:00

## 2017-11-29 NOTE — PN
Date/Time of Note


Date/Time of Note


DATE: 11/29/17 


TIME: 12:12





Assessment/Plan


Lines/Catheters


IV Catheter Type (from Nrs):  Peripheral IV


Riojas in Place (from Nrs):  No





Assessment/Plan


Chief Complaint/Hosp Course


1.  Gallbladder fossa abscess/bile leak.  +HIDA s/p ERCP with 2 stents. MRCP 

noted.


-cont drain care- please keep sterile dressing on


-abx


-pain management


-consider dc home if wbc improving


2. Leukocytosis: Recurred;  improving.


-as above


3. Elevated alk phos: likely 2/2 #1.  Slowly improving.


-as above


4. Diverticulosis


-diet/lifestyle optimization


3. Hiatal hernia: asymptomatic


-monitor


4. Atherosclerosis


-diet/exercise optimization


5. Pleural effusions: 


-pulmonary toilette


-pulmonary consult


6. Thrush


-medical management


7. Persistent nausea: 


-abx change per ID


-supportive


8. Hypertension: 


-improve bp control 





Thank you. Patient seen and examined in collaboration with Dr. Theodore Edwards.


Problems:  





Subjective


24 Hr Interval Summary


Feels well. Eager to go home. Pending discharge. Nausea improved. No fevers, 

chills, sob, congested cough, cp, palpitations, ha, dizziness, n/v/d/dysuria.





Exam/Review of Systems


Vital Signs


Vitals





 Vital Signs








  Date Time  Temp Pulse Resp B/P Pulse Ox O2 Delivery O2 Flow Rate FiO2


 


11/29/17 08:36 97.7 85 16 165/70 96   


 


11/27/17 14:05      Room Air  














 Intake and Output   


 


 11/28/17 11/28/17 11/29/17





 15:00 23:00 07:00


 


Intake Total  1850 ml 1200 ml


 


Balance  1850 ml 1200 ml











Exam


Free Text/Dictation


1.  Gallbladder fossa abscess/bile leak.  +HIDA s/p ERCP with 2 stents. MRCP 

noted.


-cont drain care- please keep sterile dressing on


-pain management


-May be discharged per medical team with abx and hh for drain care. To follow 

in office in 1 week. 


2. Leukocytosis: resolved


3. Elevated alk phos: likely 2/2 #1.  Slowly improving.


-as above


4. Diverticulosis


-diet/lifestyle optimization


3. Hiatal hernia: asymptomatic


-monitor


4. Atherosclerosis


-diet/exercise optimization


5. Pleural effusions: 


-pulmonary toilette


-pulmonary consult


6. Thrush


-medical management


7. Persistent nausea: resolved


8. Hypertension: 


-improve bp control 


9. Hypocalcemia: 


-nutrition optimization





Thank you. Patient seen and examined in collaboration with Dr. Theodore Edwards.





Results


Result Diagram:  


11/29/17 0505                                                                  

              11/29/17 0505














SHILPI IZAGUIRRE NP Nov 29, 2017 12:15

## 2017-11-29 NOTE — DS
Date/Time of Note


Date/Time of Note


DATE: 11/29/17 


TIME: 14:28





Discharge Summary


Admission/Discharge Info


Admit Date/Time


Nov 16, 2017 at 14:00


Discharge Date/Time





Patient Condition:  Stable


Hx of Present Illness


Patient is 82-year-old pleasant female who underwent laparoscopic 

cholecystectomy in October 25 by Dr. Edwards.  Patient was evaluated by Dr. Edwards in the office and noted to have leukocytosis and patient was sent for 

further evaluation and management to emergency room.  Patient complains of 

nausea, especially in the morning, also complains of dysuria, denies any 

shortness of breath, denies any chest pain.  Patient underwent CT abdomen and 

pelvis revealed fluid collection in the right upper quadrant consistent with 

postop infection versus biloma per radiology.  Urinalysis did not reveal reveal 

any signs of infection. Chest x-ray is unremarkable. Patient will be admitted 

for further evaluation and management.


Hospital Course


Patient discharged home with home health for drain care, and p.o. antibiotics 

per infectious disease recommendations.


-Gallbladder fossa abscess/bile leak. S/p drainage by radiology.  Positive HIDA 

scan. S/p ERCP and stent placement into the right hepatic duct and left hepatic 

duct on 11/20 by Dr Knapp.  Abdominal fluid culture positive for VRE, continue 

Zyvox.  Dr. Pham is following in infection disease consultation


-S/p  laparoscopic cholecystectomy 11/26/17.  Dr. Edwards is following in 

surgical consultation.


-Status post ERCP with stent placement status post subsequent stent removal


-Oral candidiasis, continue nystatin 


-Hypertension, continue Diovan and hydralazine.


-Hypothyroidism, continue Synthroid


-RA


Home Meds


Active Scripts


Phenyleph/Shark Oil/Mo/Petrol* (FORMULATION R OINT*) 1 Applic Oint, 1 APPLIC [place] 

BID for 7 Days


   Prov:RADHA DE JESUS         11/29/17


Nystatin (Nystatin) 100,000 Unit/1 Ml Oral.susp, 5 ML PO Q6 for 7 Days


   Prov:RADHA DE JESUS         11/29/17


Metronidazole (Flagyl) 500 Mg Tab, 500 MG PO Q8 for 5 Days, TAB


   Prov:VALERIAKODEVONRADHA         11/29/17


Linezolid* (Zyvox*) 600 Mg Tablet, 600 MG PO BID for 5 Days, TAB


   Prov:RADHA DE JESUS         11/29/17


Valsartan (Valsartan) 160 Mg Tablet, 160 MG PO BID for 30 Days, TAB


   Prov:RADHA DE JESUS         11/29/17


Docusate Sodium* (Colace*) 100 Mg Capsule, 100 MG PO DAILY, #30 CAP


   Prov:ANAIMIKAELACRISPIN         4/23/17


Hydrocodone/Acetaminophen (Norco 5-325 Tablet) 1 Each Tablet, 1 EACH PO Q6, #14 

TAB


   Prov:CRISPIN RAMIREZ         4/23/17


Reported Medications


Acarbose* (Precose*) 50 Mg Tablet, 50 MG PO WITH BREAKFAST DINNE, TAB


   11/18/17


Ibuprofen* (Ibuprofen*) 800 Mg Tab, 800 MG PO DAILY Y for PAIN, TAB


   11/18/17


Folic Acid* (Folic Acid*) 1 Mg Tablet, 1 MG PO DAILY, TAB


   11/18/17


Ezetimibe* (Zetia*) 10 Mg Tablet, 10 MG PO HS, TAB


   11/16/17


Cholecalciferol* (Vitamin D3*) 1,000 Unit Tablet, 5000 UNIT PO DAILY, TAB


   4/7/17


Levothyroxine Sodium* (Levoxyl*) 50 Mcg Tablet, 50 MCG PO BEFORE BREAKFAST, #30 

TAB


   4/7/17


Valsartan* (Diovan*) 160 Mg Tablet, 160 MG PO DAILY, TAB


   12/6/16


Esomeprazole Mag Trihydrate (Nexium) 40 Mg Capsule.dr 40 MG PO DAILY


   10/20/12


Follow-up Plan


Follow-up with Dr. Edwards in 1 week follow-up with PMD in 2 weeks.


Primary Care Provider


Carlos Miguel MD


Time spent on discharge:   > 30 minutes


Pending Labs





Laboratory Tests








Test


  11/28/17


17:22 11/28/17


20:54 11/29/17


05:05 11/29/17


08:23


 


Bedside Glucose


  134mg/dL


() 91mg/dL


() 


  106mg/dL


()


 


White Blood Count


  


  


  7.310^3/ul


(4.8-10.8) 


 


 


Red Blood Count


  


  


  3.1510^6/ul


(4.20-5.40) 


 


 


Hemoglobin


  


  


  8.5g/dl


(12.0-16.0) 


 


 


Hematocrit


  


  


  26.0%


(37.0-47.0) 


 


 


Mean Corpuscular Volume


  


  


  82.5fl


(82.0-101.0) 


 


 


Mean Corpuscular Hemoglobin


  


  


  27.0pg


(29.0-33.0) 


 


 


Mean Corpuscular Hemoglobin


Concent 


  


  32.7g/dl


(32.0-37.0) 


 


 


Red Cell Distribution Width


  


  


  17.3%


(11.5-14.5) 


 


 


Platelet Count


  


  


  63816^3/UL


(140-415) 


 


 


Mean Platelet Volume


  


  


  9.0fl


(7.4-10.4) 


 


 


Neutrophils %


  


  


  64.6%


(39.0-77.0) 


 


 


Lymphocytes %


  


  


  18.1%


(15.0-51.0) 


 


 


Monocytes %


  


  


  10.2%


(0.0-11.0) 


 


 


Eosinophils %   5.7% (0.0-7.0)  


 


Basophils %   1.1% (0.0-2.0)  


 


Nucleated Red Blood Cells %


  


  


  0.0/100WBC


(0.0-0.0) 


 


 


Neutrophils #


  


  


  4.710^3/ul


(1.6-7.5) 


 


 


Lymphocytes #


  


  


  1.310^3/ul


(0.8-2.9) 


 


 


Monocytes #


  


  


  0.810^3/ul


(0.3-0.9) 


 


 


Eosinophils #


  


  


  0.410^3/ul


(0.0-0.5) 


 


 


Basophils #


  


  


  0.110^3/ul


(0.0-0.1) 


 


 


Nucleated Red Blood Cells #


  


  


  0.010^3/ul


(0.0-0.0) 


 


 


Sodium Level


  


  


  136mmol/L


(135-144) 


 


 


Potassium Level


  


  


  3.7mmol/L


(3.5-5.1) 


 


 


Chloride Level


  


  


  105mmol/L


() 


 


 


Carbon Dioxide Level


  


  


  25mmol/L


(21-31) 


 


 


Anion Gap   10 (8-16)  


 


Blood Urea Nitrogen


  


  


  < 2mg/dl


(7-20) 


 


 


Creatinine


  


  


  0.46mg/dl


(0.44-1.00) 


 


 


Glucose Level


  


  


  140mg/dl


() 


 


 


Calcium Level


  


  


  7.8mg/dl


(8.4-10.2) 


 














Test


  11/29/17


11:34 


  


  


 


 


Bedside Glucose


  109mg/dL


() 


  


  


 

















RADHA DE JESUS Nov 29, 2017 14:29

## 2018-02-15 ENCOUNTER — HOSPITAL ENCOUNTER (OUTPATIENT)
Age: 83
Discharge: HOME | End: 2018-02-15

## 2018-02-15 ENCOUNTER — HOSPITAL ENCOUNTER (OUTPATIENT)
Dept: HOSPITAL 91 - GIL | Age: 83
Discharge: HOME | End: 2018-02-15
Payer: MEDICARE

## 2018-02-15 DIAGNOSIS — Z96.89: Primary | ICD-10-CM

## 2018-02-15 DIAGNOSIS — I10: ICD-10-CM

## 2018-02-15 DIAGNOSIS — E11.9: ICD-10-CM

## 2018-02-15 PROCEDURE — 82962 GLUCOSE BLOOD TEST: CPT

## 2018-02-15 PROCEDURE — 74330 X-RAY BILE/PANC ENDOSCOPY: CPT

## 2018-02-15 PROCEDURE — 43275 ERCP REMOVE FORGN BODY DUCT: CPT

## 2018-09-05 ENCOUNTER — HOSPITAL ENCOUNTER (OUTPATIENT)
Age: 83
Discharge: HOME | End: 2018-09-05

## 2018-09-05 ENCOUNTER — HOSPITAL ENCOUNTER (OUTPATIENT)
Dept: HOSPITAL 91 - GIL | Age: 83
Discharge: HOME | End: 2018-09-05
Payer: MEDICARE

## 2018-09-05 DIAGNOSIS — E78.5: ICD-10-CM

## 2018-09-05 DIAGNOSIS — K64.4: ICD-10-CM

## 2018-09-05 DIAGNOSIS — E11.9: ICD-10-CM

## 2018-09-05 DIAGNOSIS — K64.8: ICD-10-CM

## 2018-09-05 DIAGNOSIS — I10: ICD-10-CM

## 2018-09-05 DIAGNOSIS — K57.90: Primary | ICD-10-CM

## 2018-09-05 PROCEDURE — 82962 GLUCOSE BLOOD TEST: CPT

## 2018-09-05 PROCEDURE — 45378 DIAGNOSTIC COLONOSCOPY: CPT

## 2018-11-07 ENCOUNTER — HOSPITAL ENCOUNTER (OUTPATIENT)
Dept: HOSPITAL 91 - RAD | Age: 83
Discharge: HOME | End: 2018-11-07
Payer: MEDICARE

## 2018-11-07 ENCOUNTER — HOSPITAL ENCOUNTER (OUTPATIENT)
Age: 83
Discharge: HOME | End: 2018-11-07

## 2018-11-07 DIAGNOSIS — R63.4: Primary | ICD-10-CM

## 2018-11-07 PROCEDURE — 71046 X-RAY EXAM CHEST 2 VIEWS: CPT

## 2018-11-14 ENCOUNTER — HOSPITAL ENCOUNTER (OUTPATIENT)
Dept: HOSPITAL 91 - GIL | Age: 83
Discharge: HOME | End: 2018-11-14
Payer: MEDICARE

## 2018-11-14 ENCOUNTER — HOSPITAL ENCOUNTER (OUTPATIENT)
Age: 83
Discharge: HOME | End: 2018-11-14

## 2018-11-14 DIAGNOSIS — E78.00: ICD-10-CM

## 2018-11-14 DIAGNOSIS — E11.9: ICD-10-CM

## 2018-11-14 DIAGNOSIS — K44.9: ICD-10-CM

## 2018-11-14 DIAGNOSIS — D64.9: ICD-10-CM

## 2018-11-14 DIAGNOSIS — I10: ICD-10-CM

## 2018-11-14 DIAGNOSIS — E03.9: ICD-10-CM

## 2018-11-14 DIAGNOSIS — K29.70: Primary | ICD-10-CM

## 2018-11-14 PROCEDURE — 82962 GLUCOSE BLOOD TEST: CPT

## 2018-11-14 PROCEDURE — 88312 SPECIAL STAINS GROUP 1: CPT

## 2018-11-14 PROCEDURE — 43239 EGD BIOPSY SINGLE/MULTIPLE: CPT

## 2018-11-14 PROCEDURE — 88305 TISSUE EXAM BY PATHOLOGIST: CPT

## 2020-12-17 NOTE — DES
Date/Time of Note


Date/Time of Note


DATE: 4/10/17 


TIME: 20:38





Discharge/Death Summary


Admission/Discharge Info


Admit Date/Time


Apr 7, 2017 at 11:04


Discharge Date/Time





Hospital Course


1.  Abdominal pain, & leukocytosis 2nd Cholangitis and ? Cholecystitis (

probably acalculous) s/p ERCP 4/8.  Improving.


-antibiotics


-supportive care


-patient and daughter not eager for surgery if it can be avoided.  since 

leukocytosis resolving and pain improving, we maybe able to avoid surgery at 

this point.  however, i advised that if not improving or worsening needs to 

proceed with surgery.  i also advised that recommendation is to proceed with 

surgery at this point based on her clinical picture but once again they prefer 

to wait since she is improving.


2.  Sepsis 2nd above.  Improved


-as above


3. Electrolyte abnormalities with hypokalemia.  Please correct with fluid and 

nutritional management.


4.  Hypertension.  Continue nutrition and medication optimization.


5.  Prediabetes.  Continue nutrition and medication optimization.


6.  Rheumatoid arthritis, on methotrexate and steroids shots.  Continue medical 

optimization.


7.  Hypothyroidism.  Continue Synthroid.


8.  Hyperlipidemia.  Continue diet and medication optimization.


9.  History of gastritis.  Continue proton pump inhibitor and encourage 

nutritional and lifestyle optimization.


10.  Anemia without evidence of acute blood loss, continue monitoring.


 


Thank you





Pending Labs/Cultures





Laboratory Tests








Test


  4/10/17


09:10 4/10/17


12:18 4/10/17


17:27


 


White Blood Count


  10.010^3/ul


(4.8-10.8) 


  


 


 


Red Blood Count


  3.7310^6/ul


(4.20-5.40) 


  


 


 


Hemoglobin


  9.9g/dl


(12.0-16.0) 


  


 


 


Hematocrit


  31.1%


(37.0-47.0) 


  


 


 


Mean Corpuscular Volume


  83.4fl


(82.0-101.0) 


  


 


 


Mean Corpuscular Hemoglobin


  26.5pg


(29.0-33.0) 


  


 


 


Mean Corpuscular Hemoglobin


Concent 31.8g/dl


(32.0-37.0) 


  


 


 


Red Cell Distribution Width


  16.5%


(11.5-14.5) 


  


 


 


Platelet Count


  48842^3/UL


(140-415) 


  


 


 


Mean Platelet Volume


  8.9fl


(7.4-10.4) 


  


 


 


Neutrophils %


  76.1%


(39.0-77.0) 


  


 


 


Lymphocytes %


  12.6%


(15.0-51.0) 


  


 


 


Monocytes %


  7.8%


(0.0-11.0) 


  


 


 


Eosinophils % 1.9% (0.0-7.0)   


 


Basophils % 0.4% (0.0-2.0)   


 


Nucleated Red Blood Cells %


  0.0/100WBC


(0.0-0.0) 


  


 


 


Neutrophils #


  7.610^3/ul


(1.6-7.5) 


  


 


 


Lymphocytes #


  1.310^3/ul


(0.8-2.9) 


  


 


 


Monocytes #


  0.810^3/ul


(0.3-0.9) 


  


 


 


Eosinophils #


  0.210^3/ul


(0.0-0.5) 


  


 


 


Basophils #


  0.010^3/ul


(0.0-0.1) 


  


 


 


Nucleated Red Blood Cells #


  0.010^3/ul


(0.0-0.0) 


  


 


 


Sodium Level


  133mmol/L


(135-144) 


  


 


 


Potassium Level


  3.7mmol/L


(3.5-5.1) 


  


 


 


Chloride Level


  107mmol/L


() 


  


 


 


Carbon Dioxide Level


  23mmol/L


(21-31) 


  


 


 


Anion Gap 7 (8-16)   


 


Blood Urea Nitrogen 6mg/dl (7-20)   


 


Creatinine


  0.45mg/dl


(0.44-1.00) 


  


 


 


Glucose Level


  152mg/dl


() 


  


 


 


Calcium Level


  8.4mg/dl


(8.4-10.2) 


  


 


 


Magnesium Level


  2.2mg/dl


(1.7-2.5) 


  


 


 


Total Bilirubin


  0.2mg/dl


(0.2-1.3) 


  


 


 


Direct Bilirubin


  0.00mg/dl


(0.00-0.20) 


  


 


 


Indirect Bilirubin


  0.2mg/dl


(0-1.1) 


  


 


 


Aspartate Amino Transf


(AST/SGOT) 29IU/L (15-46) 


  


  


 


 


Alanine Aminotransferase


(ALT/SGPT) 90IU/L (13-69) 


  


  


 


 


Alkaline Phosphatase


  238IU/L


() 


  


 


 


Total Protein


  5.9g/dl


(6.1-8.1) 


  


 


 


Albumin


  2.8g/dl


(3.3-4.9) 


  


 


 


Bedside Glucose


  


  171mg/dL


() 133mg/dL


()

















CRISPIN RAMIREZ Apr 10, 2017 20:38
Dr Ocampo

## 2021-11-03 NOTE — RADRPT
PROCEDURE:   CT ABDOMEN AND PELVIS WITHOUT CONTRAST. 

 

CLINICAL INDICATION:   Abdominal pain and elevated WBC.

 

TECHNIQUE:   CT scan of the abdomen and pelvis without contrast was performed on a multidetector hig
h-resolution CT scanner. The patient was scanned without intravenous contrast.  Coronal and sagittal
 reformatted images were obtained from the axial source images. Images were reviewed on a high-resol
[employer] PACS workstation. The total exam CTDI equals 6.9 mGy and the total exam DLP equals 354.8 mGy-c
m.

 

One or more of the following dose reduction techniques were used:

Automated exposure control.

Adjustment of the mA and/or kV according to patient size.

Use of iterative reconstruction technique.

 

DICOM images are available

 

COMPARISON:   CT 4/18/2017 

 

FINDINGS:

 

CT abdomen:

 

The lung bases are clear. The heart size is within normal limits. There is no significant pericardia
l effusion.

 

Hepatic morphology is within limits. No gross contour deforming masses. Status post cholecystectomy.
 There is a complex ill-defined collection measuring 5.3 x 3.9 cm within the gallbladder fossa, cont
aining several foci of air.

 

The spleen and pancreas are within normal limits.

 

Both adrenal glands are within normal limits.

 

Both kidneys are and normal anatomic position. There is a right-sided renal cyst, measuring 2.2 cm. 
There is a left-sided renal cyst, measuring 2.2 cm. No gross renal/ureteric calculi. No evidence of 
obstruction or hydronephrosis.

 

The visualized GI tract demonstrates possible mild inflammation of the adjacent hepatic flexure. The
re is a moderate-to-large hiatal hernia. There is diffuse colonic diverticulosis. No evidence of bow
el obstruction.

 

Atherosclerotic calcification of the aorta is identified. There is retroperitoneal lymphadenopathy.

 

 

CT pelvis:

 

The bladder is within limits. The uterus is retroverted and calcified. The rectosigmoid colon demons
trates extensive diverticulosis. No significant free fluid. No same pelvic lymphadenopathy.

 

The visualized osseous structures demonstrates mild scoliosis and degenerative changes of the spine.

 

IMPRESSION:

 

1. COMPLEX COLLECTION WITH ILL-DEFINED BORDERS MEASURING 5.3 X 3.9 CM WITHIN THE GALLBLADDER FOSSA, 
WITH SEVERAL FOCI OF AIR. ALTHOUGH FINDINGS MAY REPRESENT POSTSURGICAL CHANGES, EARLY ABSCESS FORMAT
ION OR BILOMA IS NOT EXCLUDED.

2. There may be mild inflammation of the adjacent hepatic flexure. Diffuse colonic diverticulosis. N
o evidence of bowel obstruction. Several fluid filled loops of small bowel within the abdomen sugges
tive of ileus.

3. Moderate-to-large hiatal hernia.

4. Bilateral renal cysts. No obstruction or hydronephrosis.

5. Atherosclerotic disease of the aorta.

 

RPTAT: AAPP

_____________________________________________ 

Scott Levi Physician           Date    Time 

Electronically viewed and signed by Scott Levi Physician on 11/16/2017 13:04 

 

D:  11/16/2017 13:04  T:  11/16/2017 13:04

COLE/
PROCEDURE:   CT guided drainage of the abscess in the cholecystectomy bed drainage.  

 

CLINICAL INDICATION:   Abscess in the cholecystectomy bed 

 

TECHNIQUE:   

Informed consent was obtained. The procedure, risks, benefits, complications and alternatives  were 
explained to the patient or the patient's family. Risks including bleeding and infection were explai
[place]. The patient or the patient's family understood and was willing to proceed.  A procedural pause 
was performed. The patient's name, date of birth, and procedure to be performed were  verified.  One
 or more of the following dose reduction techniques were used: Automated exposure control, adjustmen
t of the mA and/or kV according to patient size, use of iterative reconstruction technique. DICOM im
ages are available.

 

Using local anesthetic, sterile technique and CT guidance, a 19-gauge Yueh needle was advanced into 
the fluid collection in the cholecystectomy bed.  CT scan was performed confirming position.  Purule
nt fluid was also aspirated confirming position.  The needle from the Yueh catheter was removed, devaughn
ving the Yueh catheter in place within the fluid collection.  A 0.035-inch Amplatz guidewire was adv
anced through the Yueh catheter into the fluid collection.  The Yueh catheter was removed.  The trac
t was dilated to 10-Japanese and a 10-Japanese multipurpose drainage catheter was advanced over the guid
ewire into the fluid collection.  The guidewire was removed.  Additional scanning was performed conf
irming position.  The catheter was then sutured to the patient's skin with 2-0 silk.  Approximately 
49 ml of purulent fluid was aspirated.  The catheter was connected to a drainage bag.  A dressing wa
s applied. The  patient tolerated procedure well.

 

COMPARISON:   CT scan of the abdomen and pelvis dated 11/16/2017. 

 

FINDINGS:

Final images demonstrate the drainage catheter in satisfactory position within the abscess in the ch
olecystectomy bed.

 

IMPRESSION:

1. Successful CT guided drainage of the abscess in the cholecystectomy bed .

 

RPTAT: QQ

_____________________________________________ 

.Rajesh Edwards MD, MD           Date    Time 

Electronically viewed and signed by .Rajesh Edwards MD, MD on 11/17/2017 10:59 

 

D:  11/17/2017 10:59  T:  11/17/2017 10:59

.R/
PROCEDURE:   Intraoperative imaging for ERCP with fluoroscopy.  

 

CLINICAL INDICATION:   Right upper quadrant pain.  Intraoperative. 

 

TECHNIQUE:   8 images of the right upper quadrant of the abdomen were obtained in the operating room
 with an image intensifier.  No radiologist was in attendance.  Fluoroscopy time is 377 seconds.

 

COMPARISON:   Nuclear medicine hepatobiliary scan dated 11/18/2017. 

 

FINDINGS:

The initial images demonstrate the pigtail drainage catheter in the gallbladder bed. Subsequent imag
es demonstrate the endoscope in position with contrast injected into the common bile duct. Surgical 
clips are present from previous cholecystectomy. Stents were placed in the right and left intrahepat
ic bile ducts extending into the common bile duct.

 

IMPRESSION:

1. Placement of stents in the right and left intrahepatic bile ducts extending into the common bile 
duct.

 

RPTAT: QQ

_____________________________________________ 

.Rajesh Edwards MD, MD           Date    Time 

Electronically viewed and signed by .Rajesh Edwards MD, MD on 11/21/2017 09:01 

 

D:  11/21/2017 09:01  T:  11/21/2017 09:01

.R/
PROCEDURE:   MRCP. 

 

CLINICAL INDICATION:   Leukocytosis, recent cholecystectomy. 

 

TECHNIQUE:   MRCP was performed.  Patient was examined without contrast.  3-D coronal rotating MIP i
mages of the biliary tree are available for review. DICOM images are available. 

 

COMPARISON:   ERCP, 11/20/2017; CT, 11/16/2017  

 

FINDINGS:

 

Mild right and small left pleural effusions are noted. The gallbladder is surgically absent. Percuta
neous drainage catheter remains in place, with tip in the gallbladder fossa. There has been near-com
plete resolution of previously seen complex collection in this location. No intra or extrahepatic bi
liary dilatation is identified. No evidence of common duct stone is identified. Pancreatic duct is n
ormal in caliber.

 

Liver, pancreas, spleen, adrenal glands and kidneys are unremarkable except for benign renal cysts. 
There is no obstructive uropathy. Moderate to large hiatal hernia is noted. The stomach is otherwise
 grossly unremarkable. There is no abdominal aortic aneurysm. No retroperitoneal or anna marie hepatis ly
mphadenopathy is identified. Colonic diverticulosis is noted without diverticulitis. No bowel obstru
ction is identified. The appendix is well visualized and normal.

 

The surrounding osseous structures are remarkable for scoliosis and degenerative enthesopathy of the
 spine. No focal osseous lesion is seen. 

 

IMPRESSION:

 

1.  Gallbladder is surgically absent. Percutaneous drainage catheter tip remains within the gallblad
kyung fossa. There has been near-complete interval resolution of previously seen complex collection in
 this area.  

2.  No biliary dilatation or evidence of choledocholithiasis is identified.

3.  Mild right and small left pleural effusions are noted, increased from the prior CT.

4.  Moderate to large hiatal hernia is again noted, grossly unchanged.

5.  Colonic diverticulosis is seen without diverticulitis.

 

RPTAT: AAQQ

_____________________________________________ 

.Antwan Valdez MD, MD           Date    Time 

Electronically viewed and signed by .Antwan Valdez MD, MD on 11/24/2017 16:24 

 

D:  11/24/2017 16:24  T:  11/24/2017 16:24

.R/
PROCEDURE:   XR Chest. 

 

CLINICAL INDICATION:   Shortness of breath.

 

TECHNIQUE:   Single frontal view. 

 

COMPARISON:   11/16/2017. 

 

FINDINGS:

There is mild atelectasis at the lung bases with right worse than left. The lungs are otherwise ed
r. 

The heart is mildly enlarged. 

There is a small right pleural effusion. There is no left pleural effusion. There is a cholecystosto
my tube in the right upper quadrant of the abdomen. 

There is no pneumothorax.   

 

IMPRESSION:

1.  Mild atelectasis at the lung bases with right worse than left.

2.  Mild cardiomegaly.

3.  Small right pleural effusion.

4.  Cholecystostomy tube in the right upper quadrant of the abdomen.

5.  Otherwise unremarkable chest radiograph.

 

RPTAT: QQ

_____________________________________________ 

.Rajesh Edwards MD, MD           Date    Time 

Electronically viewed and signed by .Rajesh Edwards MD, MD on 11/24/2017 14:03 

 

D:  11/24/2017 14:03  T:  11/24/2017 14:03

.R/
PROCEDURE:   XR Chest. 

 

CLINICAL INDICATION:  Shortness of breath

 

TECHNIQUE:   Single portable view of the chest was obtained 

 

COMPARISON:   April 25, 2015

 

FINDINGS:

 

The trachea is midline. The cardiac silhouette and pulmonary vascularity are within normal limits. T
he lungs are clear. The costophrenic angles are sharp. 

 

 

IMPRESSION:

 

1. No evidence of acute cardiopulmonary disease.

 

RPTAT: AAPP

_____________________________________________ 

Physician Enma           Date    Time 

Electronically viewed and signed by Scott Levi Physician on 11/16/2017 13:14 

 

D:  11/16/2017 13:14  T:  11/16/2017 13:14

COLE/
PROCEDURE:  Nuclear medicine HIDA scan 

 

CLINICAL INDICATION:  Abdominal pain. Postoperative abscess after cholecystectomy.   

 

TECHNIQUE:  8.3 mCi of technetium-99m Choletec was administered intravenously.  Planar imaging of th
e hepatobiliary system was performed.  Delayed images were obtained.  Images were reviewed on the hi
gh resolution PACS workstation.

 

COMPARISON:  CT for aspiration procedure dated 11/17/2017 and CT dated 11/16/2017. 

 

FINDINGS:  There is normal uptake throughout the hepatobiliary system.  There is excretion of radiot
racer into the biliary tract. There is a small area of radiotracer accumulation in the gallbladder f
usama with subsequent radiotracer identified within the patients drainage catheter, consistent with a
 bile leak. The common bile duct is patent and there is normal emptying into the small bowel.

 

IMPRESSION:

 

1.  Abnormal examination demonstrating a bile leak in the region of the gallbladder fossa.

2.  Patent common bile duct.

 

RPTAT: HLBP

_____________________________________________ 

.Glynn Vanegas MD, MD           Date    Time 

Electronically viewed and signed by .Glynn Vanegas MD, MD on 11/18/2017 20:47 

 

D:  11/18/2017 20:47  T:  11/18/2017 20:47

.P/
15:00

## 2023-05-05 NOTE — PN
Date/Time of Note


Date/Time of Note


DATE: 4/8/17 


TIME: 15:16





Assessment/Plan


VTE Prophylaxis


VTE Prophylaxis Intervention:  SCD's





Lines/Catheters


IV Catheter Type (from Nrs):  Peripheral IV





Assessment/Plan


Assessment/Plan


1.  Possible acute cholecystitis.  


   - per GI


   -sp ERCP/Stent placement


2.  Systemic inflammatory response syndrome with leukocytosis secondary to 

acute cholecystitis.  Continue antibiotics.  Will obtain blood cultures to rule 

out bacteremia.


3.  Rule out choledocholithiasis.  The patient will undergo MRCP.  Dr. Knapp 

was asked to see patient in gastroenterology consultation.  We will ask Dr. Edwards to see patient in general surgery consultation.


4.  Hypertension.  We will continue the patient on Diovan


5.  Hydralazine p.r.n. for systolic blood pressure above 170.


6.  Rheumatoid arthritis.  Continue methotrexate.


7.  Hypoparathyroidism.  Continue Synthroid.


8.  Hyperlipidemia.  Continue statin.


9.  Gastritis by history.  


   - Protonix for peptic ulcer disease prophylaxis. 


10. Hypokalemia- replet K, am labs


10.Sequential compression device for deep venous thrombosis prophylaxis.  





Further recommendations based on clinical course.  Plan of care discussed with 

Dr. Brooks.





Subjective


24 Hr Interval Summary


Eyes:  no complaints


ENT:  no complaints


Respiratory:  no complaints


Cardiovascular:  no complaints


Gastrointestinal:  pain


Genitourinary:  no complaints


Musculoskeletal:  no complaints


Skin:  no complaints


Neurologic:  no complaints


Endocrine:  no complaints


Lymphatic:  no complaints


Psychological:  no complaints


Immunologic:  no complaints





Exam/Review of Systems


Vital Signs


Vitals





 Vital Signs








  Date Time  Temp Pulse Resp B/P Pulse Ox O2 Delivery O2 Flow Rate FiO2


 


4/8/17 14:01  63 18 156/68 100 Nasal Cannula 2.0 


 


4/8/17 12:13 98.2       














 Intake and Output   


 


 4/7/17 4/7/17 4/8/17





 15:00 23:00 07:00


 


Intake Total 1050 ml 660 ml 800 ml


 


Output Total   2000 ml


 


Balance 1050 ml 660 ml -1200 ml











Exam


Constitutional:  alert, other (sleepy but awakens when called by name.), well 

developed


Psych:  nl mood/affect


Head:  atraumatic


Eyes:  EOMI, nl sclera


ENMT:  nl external ears & nose


Neck:  non-tender


Respiratory:  clear to auscultation


Cardiovascular:  nl pulses


Gastrointestinal:  soft, tender (RUQ tenderness )


Musculoskeletal:  nl extremities to inspection


Extremities:  normal pulses


Neurological:  CNS II-XII intact, other


Skin:  nl turgor


Lymph:  nontender





Results


Result Diagram:  


4/8/17 0527 4/8/17 0527





Results 24 hrs





Laboratory Tests








Test


  4/8/17


05:27


 


White Blood Count 13.6  #H


 


Red Blood Count 3.40  L


 


Hemoglobin 9.2  L


 


Hematocrit 28.6  L


 


Mean Corpuscular Volume 84.1  


 


Mean Corpuscular Hemoglobin 27.1  L


 


Mean Corpuscular Hemoglobin


Concent 32.2  


 


 


Red Cell Distribution Width 16.3  H


 


Platelet Count 389  


 


Mean Platelet Volume 9.5  


 


Neutrophils % 79.8  H


 


Lymphocytes % 10.1  L


 


Monocytes % 9.0  


 


Eosinophils % 0.4  


 


Basophils % 0.2  


 


Nucleated Red Blood Cells % 0.0  


 


Neutrophils # 10.9  H


 


Lymphocytes # 1.4  


 


Monocytes # 1.2  H


 


Eosinophils # 0.1  


 


Basophils # 0.0  


 


Nucleated Red Blood Cells # 0.0  


 


Sodium Level 138  


 


Potassium Level 3.0  L


 


Chloride Level 106  


 


Carbon Dioxide Level 24  


 


Anion Gap 11  


 


Blood Urea Nitrogen 8  


 


Creatinine 0.47  


 


Glucose Level 142  


 


Calcium Level 7.9  L











Medications


Medications





 Current Medications


Acetaminophen (Tylenol Supp) 650 mg Q6H  PRN AR fever;  Start 4/7/17 at 18:00


Morphine Sulfate (morphine) 1 mg Q4H  PRN IV PAIN Last administered on 4/7/17at 

21:51; Admin Dose 1 MG;  Start 4/7/17 at 18:00


Levothyroxine Sodium (Synthroid) 50 mcg DAILY@06 PO ;  Start 4/8/17 at 06:00


Valsartan (Diovan) 160 mg DAILY PO ;  Start 4/8/17 at 09:00


Methotrexate (Methotrexate) 2.5 mg DAILY PO ;  Start 4/8/17 at 09:00


Miscellaneous Information 1 ea NOTE XX ;  Start 4/7/17 at 18:00


Glucose (Glutose) 15 gm Q15M  PRN PO DECREASED GLUCOSE;  Start 4/7/17 at 18:00


Glucose (Glutose) 22.5 gm Q15M  PRN PO DECREASED GLUCOSE;  Start 4/7/17 at 18:00


Dextrose (D50w Syringe) 25 ml Q15M  PRN IV DECREASED GLUCOSE;  Start 4/7/17 at 

18:00


Dextrose (D50w Syringe) 50 ml Q15M  PRN IV DECREASED GLUCOSE;  Start 4/7/17 at 

18:00


Glucagon (Glucagen) 1 mg Q15M  PRN IM DECREASED GLUCOSE;  Start 4/7/17 at 18:00


Glucose 15 gm 15 gm Q15M  PRN BUCCAL DECREASED GLUCOSE;  Start 4/7/17 at 18:00


Potassium Chloride/Dextrose/ Sod Cl (D5-NS + KCl 20 Meq) 1,000 ml @  70 mls/hr 

[id number] IV  Last administered on 4/7/17at 19:53; Admin Dose 70 MLS/HR;  Start 4/ 7/17 at 18:30


Levothyroxine Sodium (Synthroid Iv) 25 mcg DAILY@06 IV  Last administered on 4/8 /17at 05:32; Admin Dose 25 MCG;  Start 4/8/17 at 06:00;  Status Future Hold


Hydralazine HCl (Apresoline) 10 mg Q6H  PRN IV SBP>170;  Start 4/7/17 at 18:30


Ondansetron HCl (Zofran Inj) 4 mg Q6H  PRN IV NAUSEA AND/OR VOMITING;  Start 4/7 /17 at 18:30


Morphine Sulfate 2 mg 2 mg Q4H  PRN IV PAIN;  Start 4/7/17 at 18:30


Ceftriaxone Sodium 50 ml @  100 mls/hr Q24H IVPB  Last administered on 4/8/17at 

09:37; Admin Dose 100 MLS/HR;  Start 4/8/17 at 10:00


Metronidazole (Flagyl 500 Mg (Pmx)) 100 ml @  100 mls/hr Q8 IVPB  Last 

administered on 4/8/17at 05:30; Admin Dose 100 MLS/HR;  Start 4/7/17 at 22:00











CRISPIN RAMIREZ Apr 8, 2017 15:23 Patient is currently at EP being set up on holter. Patient did not review portal message sent by Jody Gutierrez RN.